# Patient Record
Sex: FEMALE | Race: BLACK OR AFRICAN AMERICAN | NOT HISPANIC OR LATINO | Employment: FULL TIME | URBAN - METROPOLITAN AREA
[De-identification: names, ages, dates, MRNs, and addresses within clinical notes are randomized per-mention and may not be internally consistent; named-entity substitution may affect disease eponyms.]

---

## 2017-03-31 ENCOUNTER — COMMUNICATION - HEALTHEAST (OUTPATIENT)
Dept: INTERNAL MEDICINE | Facility: CLINIC | Age: 22
End: 2017-03-31

## 2017-04-05 ENCOUNTER — COMMUNICATION - HEALTHEAST (OUTPATIENT)
Dept: INTERNAL MEDICINE | Facility: CLINIC | Age: 22
End: 2017-04-05

## 2017-04-07 ENCOUNTER — COMMUNICATION - HEALTHEAST (OUTPATIENT)
Dept: INTERNAL MEDICINE | Facility: CLINIC | Age: 22
End: 2017-04-07

## 2017-05-01 ENCOUNTER — OFFICE VISIT - HEALTHEAST (OUTPATIENT)
Dept: INTERNAL MEDICINE | Facility: CLINIC | Age: 22
End: 2017-05-01

## 2017-05-01 ENCOUNTER — COMMUNICATION - HEALTHEAST (OUTPATIENT)
Dept: INTERNAL MEDICINE | Facility: CLINIC | Age: 22
End: 2017-05-01

## 2017-05-01 DIAGNOSIS — R30.0 DYSURIA: ICD-10-CM

## 2017-05-01 DIAGNOSIS — N89.8 VAGINAL DISCHARGE: ICD-10-CM

## 2017-05-01 DIAGNOSIS — N91.2 ABSENT MENSES: ICD-10-CM

## 2017-05-01 DIAGNOSIS — F43.21 SITUATIONAL DEPRESSION: ICD-10-CM

## 2017-05-01 DIAGNOSIS — E28.2 PCOS (POLYCYSTIC OVARIAN SYNDROME): ICD-10-CM

## 2017-05-01 LAB — HBA1C MFR BLD: 5.6 % (ref 3.5–6)

## 2017-05-01 ASSESSMENT — MIFFLIN-ST. JEOR: SCORE: 1529.21

## 2017-05-02 ENCOUNTER — COMMUNICATION - HEALTHEAST (OUTPATIENT)
Dept: INTERNAL MEDICINE | Facility: CLINIC | Age: 22
End: 2017-05-02

## 2017-05-15 ENCOUNTER — OFFICE VISIT - HEALTHEAST (OUTPATIENT)
Dept: BEHAVIORAL HEALTH | Facility: CLINIC | Age: 22
End: 2017-05-15

## 2017-05-15 DIAGNOSIS — F33.1 MODERATE EPISODE OF RECURRENT MAJOR DEPRESSIVE DISORDER (H): ICD-10-CM

## 2017-05-24 ENCOUNTER — OFFICE VISIT - HEALTHEAST (OUTPATIENT)
Dept: BEHAVIORAL HEALTH | Facility: CLINIC | Age: 22
End: 2017-05-24

## 2017-05-24 DIAGNOSIS — F33.1 MODERATE EPISODE OF RECURRENT MAJOR DEPRESSIVE DISORDER (H): ICD-10-CM

## 2017-06-05 ENCOUNTER — COMMUNICATION - HEALTHEAST (OUTPATIENT)
Dept: INTERNAL MEDICINE | Facility: CLINIC | Age: 22
End: 2017-06-05

## 2017-09-15 ENCOUNTER — OFFICE VISIT - HEALTHEAST (OUTPATIENT)
Dept: BEHAVIORAL HEALTH | Facility: CLINIC | Age: 22
End: 2017-09-15

## 2017-09-15 DIAGNOSIS — F33.1 MODERATE EPISODE OF RECURRENT MAJOR DEPRESSIVE DISORDER (H): ICD-10-CM

## 2017-09-29 ENCOUNTER — OFFICE VISIT - HEALTHEAST (OUTPATIENT)
Dept: BEHAVIORAL HEALTH | Facility: CLINIC | Age: 22
End: 2017-09-29

## 2017-09-29 DIAGNOSIS — F33.1 MODERATE EPISODE OF RECURRENT MAJOR DEPRESSIVE DISORDER (H): ICD-10-CM

## 2017-10-27 ENCOUNTER — OFFICE VISIT - HEALTHEAST (OUTPATIENT)
Dept: BEHAVIORAL HEALTH | Facility: CLINIC | Age: 22
End: 2017-10-27

## 2017-10-27 DIAGNOSIS — F33.1 MODERATE EPISODE OF RECURRENT MAJOR DEPRESSIVE DISORDER (H): ICD-10-CM

## 2018-02-06 ENCOUNTER — OFFICE VISIT - HEALTHEAST (OUTPATIENT)
Dept: INTERNAL MEDICINE | Facility: CLINIC | Age: 23
End: 2018-02-06

## 2018-02-06 DIAGNOSIS — K21.9 GERD (GASTROESOPHAGEAL REFLUX DISEASE): ICD-10-CM

## 2018-02-06 DIAGNOSIS — E55.9 VITAMIN D DEFICIENCY: ICD-10-CM

## 2018-02-06 DIAGNOSIS — M25.512 ACUTE PAIN OF LEFT SHOULDER: ICD-10-CM

## 2018-02-06 DIAGNOSIS — E28.2 PCOS (POLYCYSTIC OVARIAN SYNDROME): ICD-10-CM

## 2018-02-06 DIAGNOSIS — L68.0 HIRSUTISM: ICD-10-CM

## 2018-02-06 LAB — HBA1C MFR BLD: 5.5 % (ref 3.5–6)

## 2018-02-08 ENCOUNTER — COMMUNICATION - HEALTHEAST (OUTPATIENT)
Dept: INTERNAL MEDICINE | Facility: CLINIC | Age: 23
End: 2018-02-08

## 2018-02-08 LAB
25(OH)D3 SERPL-MCNC: 32.7 NG/ML (ref 30–80)
25(OH)D3 SERPL-MCNC: 32.7 NG/ML (ref 30–80)

## 2018-02-22 ENCOUNTER — OFFICE VISIT - HEALTHEAST (OUTPATIENT)
Dept: MIDWIFE SERVICES | Facility: CLINIC | Age: 23
End: 2018-02-22

## 2018-02-22 ENCOUNTER — HOSPITAL ENCOUNTER (OUTPATIENT)
Dept: ULTRASOUND IMAGING | Facility: CLINIC | Age: 23
Discharge: HOME | End: 2018-02-22
Attending: ADVANCED PRACTICE MIDWIFE

## 2018-02-22 DIAGNOSIS — E28.2 PCOS (POLYCYSTIC OVARIAN SYNDROME): ICD-10-CM

## 2018-02-22 DIAGNOSIS — E66.9 OBESITY (BMI 30-39.9): ICD-10-CM

## 2018-02-22 ASSESSMENT — MIFFLIN-ST. JEOR: SCORE: 1520.14

## 2018-02-23 ENCOUNTER — COMMUNICATION - HEALTHEAST (OUTPATIENT)
Dept: MIDWIFE SERVICES | Facility: CLINIC | Age: 23
End: 2018-02-23

## 2018-02-27 ENCOUNTER — COMMUNICATION - HEALTHEAST (OUTPATIENT)
Dept: MIDWIFE SERVICES | Facility: CLINIC | Age: 23
End: 2018-02-27

## 2018-03-01 ENCOUNTER — COMMUNICATION - HEALTHEAST (OUTPATIENT)
Dept: MIDWIFE SERVICES | Facility: CLINIC | Age: 23
End: 2018-03-01

## 2018-03-01 DIAGNOSIS — N97.0 PRIMARY ANOVULATORY INFERTILITY: ICD-10-CM

## 2018-03-12 ENCOUNTER — RECORDS - HEALTHEAST (OUTPATIENT)
Dept: ADMINISTRATIVE | Facility: OTHER | Age: 23
End: 2018-03-12

## 2018-05-05 ENCOUNTER — TRANSFERRED RECORDS (OUTPATIENT)
Dept: HEALTH INFORMATION MANAGEMENT | Facility: CLINIC | Age: 23
End: 2018-05-05

## 2018-06-25 ENCOUNTER — OFFICE VISIT - HEALTHEAST (OUTPATIENT)
Dept: INTERNAL MEDICINE | Facility: CLINIC | Age: 23
End: 2018-06-25

## 2018-06-25 DIAGNOSIS — G43.009 MIGRAINE WITHOUT AURA AND WITHOUT STATUS MIGRAINOSUS, NOT INTRACTABLE: ICD-10-CM

## 2018-06-25 DIAGNOSIS — K21.9 GASTROESOPHAGEAL REFLUX DISEASE WITHOUT ESOPHAGITIS: ICD-10-CM

## 2018-08-29 ENCOUNTER — OFFICE VISIT - HEALTHEAST (OUTPATIENT)
Dept: INTERNAL MEDICINE | Facility: CLINIC | Age: 23
End: 2018-08-29

## 2018-08-29 ENCOUNTER — COMMUNICATION - HEALTHEAST (OUTPATIENT)
Dept: SCHEDULING | Facility: CLINIC | Age: 23
End: 2018-08-29

## 2018-08-29 DIAGNOSIS — L81.0 HYPERPIGMENTATION OF SKIN, POSTINFLAMMATORY: ICD-10-CM

## 2018-08-29 DIAGNOSIS — K21.9 GASTROESOPHAGEAL REFLUX DISEASE WITHOUT ESOPHAGITIS: ICD-10-CM
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
## 2018-08-29 DIAGNOSIS — G43.009 MIGRAINE WITHOUT AURA AND WITHOUT STATUS MIGRAINOSUS, NOT INTRACTABLE: ICD-10-CM

## 2018-08-29 ASSESSMENT — MIFFLIN-ST. JEOR: SCORE: 1543.73

## 2018-12-12 ENCOUNTER — COMMUNICATION - HEALTHEAST (OUTPATIENT)
Dept: INTERNAL MEDICINE | Facility: CLINIC | Age: 23
End: 2018-12-12

## 2019-03-28 ENCOUNTER — OFFICE VISIT - HEALTHEAST (OUTPATIENT)
Dept: INTERNAL MEDICINE | Facility: CLINIC | Age: 24
End: 2019-03-28

## 2019-03-28 DIAGNOSIS — Z12.4 SCREENING FOR CERVICAL CANCER: ICD-10-CM

## 2019-03-28 DIAGNOSIS — B96.89 BACTERIAL VAGINITIS: ICD-10-CM

## 2019-03-28 DIAGNOSIS — Z00.00 HEALTH MAINTENANCE EXAMINATION: ICD-10-CM

## 2019-03-28 DIAGNOSIS — N76.0 BACTERIAL VAGINITIS: ICD-10-CM

## 2019-03-28 DIAGNOSIS — E55.9 VITAMIN D DEFICIENCY: ICD-10-CM

## 2019-03-28 DIAGNOSIS — Z11.3 SCREEN FOR STD (SEXUALLY TRANSMITTED DISEASE): ICD-10-CM

## 2019-03-28 DIAGNOSIS — J01.90 ACUTE SINUSITIS, RECURRENCE NOT SPECIFIED, UNSPECIFIED LOCATION: ICD-10-CM

## 2019-03-28 DIAGNOSIS — R73.9 HYPERGLYCEMIA: ICD-10-CM

## 2019-03-28 DIAGNOSIS — R11.0 NAUSEA: ICD-10-CM

## 2019-03-28 DIAGNOSIS — N89.8 VAGINAL DISCHARGE: ICD-10-CM

## 2019-03-28 DIAGNOSIS — K21.9 GASTROESOPHAGEAL REFLUX DISEASE WITHOUT ESOPHAGITIS: ICD-10-CM

## 2019-03-28 DIAGNOSIS — E28.2 PCOS (POLYCYSTIC OVARIAN SYNDROME): ICD-10-CM

## 2019-03-28 LAB
ALBUMIN SERPL-MCNC: 3.7 G/DL (ref 3.5–5)
ALP SERPL-CCNC: 57 U/L (ref 45–120)
ALT SERPL W P-5'-P-CCNC: 13 U/L (ref 0–45)
ANION GAP SERPL CALCULATED.3IONS-SCNC: 11 MMOL/L (ref 5–18)
AST SERPL W P-5'-P-CCNC: 15 U/L (ref 0–40)
BILIRUB SERPL-MCNC: 0.2 MG/DL (ref 0–1)
BUN SERPL-MCNC: 15 MG/DL (ref 8–22)
CALCIUM SERPL-MCNC: 9.7 MG/DL (ref 8.5–10.5)
CHLORIDE BLD-SCNC: 104 MMOL/L (ref 98–107)
CLUE CELLS: ABNORMAL
CO2 SERPL-SCNC: 26 MMOL/L (ref 22–31)
CREAT SERPL-MCNC: 0.79 MG/DL (ref 0.6–1.1)
ERYTHROCYTE [DISTWIDTH] IN BLOOD BY AUTOMATED COUNT: 12.1 % (ref 11–14.5)
GFR SERPL CREATININE-BSD FRML MDRD: >60 ML/MIN/1.73M2
GLUCOSE BLD-MCNC: 93 MG/DL (ref 70–125)
HBA1C MFR BLD: 5.6 % (ref 3.5–6)
HCT VFR BLD AUTO: 41.3 % (ref 35–47)
HGB BLD-MCNC: 13.6 G/DL (ref 12–16)
HIV 1+2 AB+HIV1 P24 AG SERPL QL IA: NEGATIVE
MCH RBC QN AUTO: 25.8 PG (ref 27–34)
MCHC RBC AUTO-ENTMCNC: 33 G/DL (ref 32–36)
MCV RBC AUTO: 78 FL (ref 80–100)
PLATELET # BLD AUTO: 287 THOU/UL (ref 140–440)
PMV BLD AUTO: 7.3 FL (ref 7–10)
POTASSIUM BLD-SCNC: 4 MMOL/L (ref 3.5–5)
PROT SERPL-MCNC: 7.2 G/DL (ref 6–8)
RBC # BLD AUTO: 5.28 MILL/UL (ref 3.8–5.4)
SODIUM SERPL-SCNC: 141 MMOL/L (ref 136–145)
TRICHOMONAS, WET PREP: ABNORMAL
WBC: 4.1 THOU/UL (ref 4–11)
YEAST, WET PREP: ABNORMAL

## 2019-03-28 ASSESSMENT — MIFFLIN-ST. JEOR: SCORE: 1559.02

## 2019-03-29 LAB
25(OH)D3 SERPL-MCNC: 27.5 NG/ML (ref 30–80)
25(OH)D3 SERPL-MCNC: 27.5 NG/ML (ref 30–80)
C TRACH DNA SPEC QL PROBE+SIG AMP: NEGATIVE
N GONORRHOEA DNA SPEC QL NAA+PROBE: NEGATIVE
T PALLIDUM AB SER QL: NEGATIVE

## 2019-04-01 LAB
BKR LAB AP ABNORMAL BLEEDING: NO
BKR LAB AP BIRTH CONTROL/HORMONES: NORMAL
BKR LAB AP CERVICAL APPEARANCE: NORMAL
BKR LAB AP GYN ADEQUACY: NORMAL
BKR LAB AP GYN INTERPRETATION: NORMAL
BKR LAB AP HPV REFLEX: NORMAL
BKR LAB AP LMP: NORMAL
BKR LAB AP PATIENT STATUS: NORMAL
BKR LAB AP PREVIOUS ABNORMAL: NO
BKR LAB AP PREVIOUS NORMAL: NORMAL
HIGH RISK?: NO
PATH REPORT.COMMENTS IMP SPEC: NORMAL
RESULT FLAG (HE HISTORICAL CONVERSION): NORMAL

## 2019-04-08 ENCOUNTER — OFFICE VISIT - HEALTHEAST (OUTPATIENT)
Dept: BEHAVIORAL HEALTH | Facility: CLINIC | Age: 24
End: 2019-04-08

## 2019-04-08 DIAGNOSIS — F33.1 MODERATE EPISODE OF RECURRENT MAJOR DEPRESSIVE DISORDER (H): ICD-10-CM

## 2019-04-08 DIAGNOSIS — F41.1 GAD (GENERALIZED ANXIETY DISORDER): ICD-10-CM

## 2019-04-09 ENCOUNTER — COMMUNICATION - HEALTHEAST (OUTPATIENT)
Dept: INTERNAL MEDICINE | Facility: CLINIC | Age: 24
End: 2019-04-09

## 2019-04-15 ENCOUNTER — OFFICE VISIT - HEALTHEAST (OUTPATIENT)
Dept: BEHAVIORAL HEALTH | Facility: CLINIC | Age: 24
End: 2019-04-15

## 2019-04-15 DIAGNOSIS — F33.1 MODERATE EPISODE OF RECURRENT MAJOR DEPRESSIVE DISORDER (H): ICD-10-CM

## 2019-04-15 DIAGNOSIS — F41.1 GAD (GENERALIZED ANXIETY DISORDER): ICD-10-CM

## 2019-05-23 ENCOUNTER — OFFICE VISIT - HEALTHEAST (OUTPATIENT)
Dept: INTERNAL MEDICINE | Facility: CLINIC | Age: 24
End: 2019-05-23

## 2019-05-23 DIAGNOSIS — J01.90 ACUTE SINUSITIS, RECURRENCE NOT SPECIFIED, UNSPECIFIED LOCATION: ICD-10-CM

## 2019-05-23 ASSESSMENT — MIFFLIN-ST. JEOR: SCORE: 1584.77

## 2019-06-23 ENCOUNTER — COMMUNICATION - HEALTHEAST (OUTPATIENT)
Dept: SCHEDULING | Facility: CLINIC | Age: 24
End: 2019-06-23

## 2019-06-24 ENCOUNTER — OFFICE VISIT - HEALTHEAST (OUTPATIENT)
Dept: INTERNAL MEDICINE | Facility: CLINIC | Age: 24
End: 2019-06-24

## 2019-06-24 DIAGNOSIS — N76.0 ACUTE VAGINITIS: ICD-10-CM

## 2019-06-24 LAB
CLUE CELLS: ABNORMAL
TRICHOMONAS, WET PREP: ABNORMAL
YEAST, WET PREP: ABNORMAL

## 2019-06-24 ASSESSMENT — MIFFLIN-ST. JEOR: SCORE: 1524.68

## 2019-07-08 ENCOUNTER — OFFICE VISIT - HEALTHEAST (OUTPATIENT)
Dept: INTERNAL MEDICINE | Facility: CLINIC | Age: 24
End: 2019-07-08

## 2019-07-08 ENCOUNTER — COMMUNICATION - HEALTHEAST (OUTPATIENT)
Dept: SCHEDULING | Facility: CLINIC | Age: 24
End: 2019-07-08

## 2019-07-08 DIAGNOSIS — G56.21 ULNAR NEURITIS, RIGHT: ICD-10-CM

## 2019-07-08 DIAGNOSIS — M25.531 RIGHT WRIST PAIN: ICD-10-CM

## 2019-07-29 ENCOUNTER — COMMUNICATION - HEALTHEAST (OUTPATIENT)
Dept: SCHEDULING | Facility: CLINIC | Age: 24
End: 2019-07-29

## 2019-08-02 ENCOUNTER — RECORDS - HEALTHEAST (OUTPATIENT)
Dept: ADMINISTRATIVE | Facility: OTHER | Age: 24
End: 2019-08-02

## 2019-08-02 ENCOUNTER — OFFICE VISIT - HEALTHEAST (OUTPATIENT)
Dept: FAMILY MEDICINE | Facility: CLINIC | Age: 24
End: 2019-08-02

## 2019-08-02 DIAGNOSIS — K21.9 GASTROESOPHAGEAL REFLUX DISEASE WITHOUT ESOPHAGITIS: ICD-10-CM

## 2019-08-02 DIAGNOSIS — J39.2 THROAT IRRITATION: ICD-10-CM

## 2019-08-16 ENCOUNTER — RECORDS - HEALTHEAST (OUTPATIENT)
Dept: ADMINISTRATIVE | Facility: OTHER | Age: 24
End: 2019-08-16

## 2019-09-13 ENCOUNTER — RECORDS - HEALTHEAST (OUTPATIENT)
Dept: ADMINISTRATIVE | Facility: OTHER | Age: 24
End: 2019-09-13

## 2019-09-17 ENCOUNTER — RECORDS - HEALTHEAST (OUTPATIENT)
Dept: ADMINISTRATIVE | Facility: OTHER | Age: 24
End: 2019-09-17

## 2019-09-17 LAB
LAB AP CHARGES (HE HISTORICAL CONVERSION): NORMAL
PATH REPORT.COMMENTS IMP SPEC: NORMAL
PATH REPORT.FINAL DX SPEC: NORMAL
PATH REPORT.GROSS SPEC: NORMAL
PATH REPORT.MICROSCOPIC SPEC OTHER STN: NORMAL
PATH REPORT.RELEVANT HX SPEC: NORMAL
RESULT FLAG (HE HISTORICAL CONVERSION): NORMAL

## 2019-09-20 ENCOUNTER — RECORDS - HEALTHEAST (OUTPATIENT)
Dept: ADMINISTRATIVE | Facility: OTHER | Age: 24
End: 2019-09-20

## 2019-12-17 ENCOUNTER — COMMUNICATION - HEALTHEAST (OUTPATIENT)
Dept: SCHEDULING | Facility: CLINIC | Age: 24
End: 2019-12-17

## 2019-12-17 DIAGNOSIS — N76.0 VAGINITIS: ICD-10-CM

## 2019-12-30 ENCOUNTER — COMMUNICATION - HEALTHEAST (OUTPATIENT)
Dept: INTERNAL MEDICINE | Facility: CLINIC | Age: 24
End: 2019-12-30

## 2019-12-30 DIAGNOSIS — K21.9 GASTROESOPHAGEAL REFLUX DISEASE WITHOUT ESOPHAGITIS: ICD-10-CM

## 2020-02-12 ENCOUNTER — COMMUNICATION - HEALTHEAST (OUTPATIENT)
Dept: SCHEDULING | Facility: CLINIC | Age: 25
End: 2020-02-12

## 2020-02-12 ENCOUNTER — HOSPITAL ENCOUNTER (OUTPATIENT)
Dept: ULTRASOUND IMAGING | Facility: CLINIC | Age: 25
Discharge: HOME | End: 2020-02-12
Attending: ADVANCED PRACTICE MIDWIFE

## 2020-02-12 ENCOUNTER — OFFICE VISIT - HEALTHEAST (OUTPATIENT)
Dept: MIDWIFE SERVICES | Facility: CLINIC | Age: 25
End: 2020-02-12

## 2020-02-12 DIAGNOSIS — Z23 NEED FOR PROPHYLACTIC VACCINATION WITH TETANUS-DIPHTHERIA (TD): ICD-10-CM

## 2020-02-12 DIAGNOSIS — R10.32 LLQ ABDOMINAL PAIN: ICD-10-CM

## 2020-02-12 LAB
ALBUMIN UR-MCNC: NEGATIVE MG/DL
APPEARANCE UR: CLEAR
BILIRUB UR QL STRIP: NEGATIVE
CLUE CELLS: NORMAL
COLOR UR AUTO: YELLOW
GLUCOSE UR STRIP-MCNC: NEGATIVE MG/DL
HCG UR QL: NEGATIVE
HGB UR QL STRIP: NEGATIVE
KETONES UR STRIP-MCNC: NEGATIVE MG/DL
LEUKOCYTE ESTERASE UR QL STRIP: NEGATIVE
NITRATE UR QL: NEGATIVE
PH UR STRIP: 7 [PH] (ref 5–8)
SP GR UR STRIP: 1.02 (ref 1–1.03)
TRICHOMONAS, WET PREP: NORMAL
UROBILINOGEN UR STRIP-ACNC: NORMAL
YEAST, WET PREP: NORMAL

## 2020-02-12 RX ORDER — 1.1% SODIUM FLUORIDE PRESCRIPTION DENTAL CREAM 5 MG/G
CREAM DENTAL
Status: SHIPPED | COMMUNITY
Start: 2020-02-06 | End: 2023-01-25

## 2020-02-12 ASSESSMENT — MIFFLIN-ST. JEOR: SCORE: 1488.39

## 2020-02-13 LAB
C TRACH DNA SPEC QL PROBE+SIG AMP: NEGATIVE
N GONORRHOEA DNA SPEC QL NAA+PROBE: NEGATIVE

## 2020-03-04 ENCOUNTER — OFFICE VISIT - HEALTHEAST (OUTPATIENT)
Dept: MIDWIFE SERVICES | Facility: CLINIC | Age: 25
End: 2020-03-04

## 2020-03-04 DIAGNOSIS — N94.6 DYSMENORRHEA: ICD-10-CM

## 2020-03-04 DIAGNOSIS — Z11.3 SCREEN FOR STD (SEXUALLY TRANSMITTED DISEASE): ICD-10-CM

## 2020-03-04 DIAGNOSIS — N93.9 ABNORMAL UTERINE BLEEDING (AUB): ICD-10-CM

## 2020-03-04 DIAGNOSIS — N97.0 PRIMARY ANOVULATORY INFERTILITY: ICD-10-CM

## 2020-03-04 DIAGNOSIS — E28.2 PCOS (POLYCYSTIC OVARIAN SYNDROME): ICD-10-CM

## 2020-03-04 LAB
CLUE CELLS: NORMAL
HCG SERPL-ACNC: <2 MLU/ML (ref 0–4)
HIV 1+2 AB+HIV1 P24 AG SERPL QL IA: NEGATIVE
TRICHOMONAS, WET PREP: NORMAL
YEAST, WET PREP: NORMAL

## 2020-03-04 ASSESSMENT — MIFFLIN-ST. JEOR: SCORE: 1506.53

## 2020-03-05 LAB
C TRACH DNA SPEC QL PROBE+SIG AMP: NEGATIVE
N GONORRHOEA DNA SPEC QL NAA+PROBE: NEGATIVE
T PALLIDUM AB SER QL: NEGATIVE

## 2020-03-24 ENCOUNTER — COMMUNICATION - HEALTHEAST (OUTPATIENT)
Dept: INTERNAL MEDICINE | Facility: CLINIC | Age: 25
End: 2020-03-24

## 2020-03-24 ENCOUNTER — OFFICE VISIT - HEALTHEAST (OUTPATIENT)
Dept: FAMILY MEDICINE | Facility: CLINIC | Age: 25
End: 2020-03-24

## 2020-03-24 DIAGNOSIS — H10.31 ACUTE BACTERIAL CONJUNCTIVITIS OF RIGHT EYE: ICD-10-CM

## 2020-03-24 DIAGNOSIS — Z32.00 ENCOUNTER FOR PREGNANCY TEST, RESULT UNKNOWN: ICD-10-CM

## 2020-03-24 LAB — HCG UR QL: NEGATIVE

## 2020-03-30 ENCOUNTER — COMMUNICATION - HEALTHEAST (OUTPATIENT)
Dept: INTERNAL MEDICINE | Facility: CLINIC | Age: 25
End: 2020-03-30

## 2020-04-30 ENCOUNTER — OFFICE VISIT - HEALTHEAST (OUTPATIENT)
Dept: INTERNAL MEDICINE | Facility: CLINIC | Age: 25
End: 2020-04-30

## 2020-04-30 ENCOUNTER — COMMUNICATION - HEALTHEAST (OUTPATIENT)
Dept: INTERNAL MEDICINE | Facility: CLINIC | Age: 25
End: 2020-04-30

## 2020-04-30 DIAGNOSIS — J01.01 ACUTE RECURRENT MAXILLARY SINUSITIS: ICD-10-CM

## 2020-05-01 ENCOUNTER — COMMUNICATION - HEALTHEAST (OUTPATIENT)
Dept: INTERNAL MEDICINE | Facility: CLINIC | Age: 25
End: 2020-05-01

## 2020-09-08 ENCOUNTER — VIRTUAL VISIT (OUTPATIENT)
Dept: FAMILY MEDICINE | Facility: OTHER | Age: 25
End: 2020-09-08

## 2020-09-08 NOTE — PROGRESS NOTES
"Date: 2020 12:48:09  Clinician: Dominguez Seay  Clinician NPI: [id number]  Patient: Sakshi White  Patient : 1995  Patient Address: [facility] [address], [place] [address]  Patient Phone: [phone]  Visit Protocol: URI  Patient Summary:  Sakshi is a 25 year old ( : 1995 ) female who initiated a Visit for COVID-19 (Coronavirus) evaluation and screening. When asked the question \"Please sign me up to receive news, health information and promotions. \", Sakshi responded \"No\".    Sakshi states her symptoms started 1-2 days ago.   Her symptoms consist of ear pain, anosmia, facial pain or pressure, rhinitis, a sore throat, nasal congestion, a headache, chills, and malaise. She is experiencing mild difficulty breathing with activities but can speak normally in full sentences.   Symptom details     Nasal secretions: The color of her mucus is white and yellow.    Sore throat: Sakshi reports having mild throat pain (1-3 on a 10 point pain scale), does not have exudate on her tonsils, and can swallow liquids. She is not sure if the lymph nodes in her neck are enlarged. A rash has not appeared on the skin since the sore throat started.     Facial pain or pressure: The facial pain or pressure feels worse when bending over or leaning forward.     Headache: She states the headache is mild (1-3 on a 10 point pain scale).      Sakshi denies having fever, vomiting, nausea, wheezing, teeth pain, ageusia, diarrhea, cough, and myalgias. She also denies taking antibiotic medication in the past month and having recent facial or sinus surgery in the past 60 days.   Precipitating events  Sakshi is not sure if she has been exposed to someone with strep throat. She has not recently been exposed to someone with influenza. Sakshi has been in close contact with the following high risk individuals: adults 65 or older and people with asthma, heart disease or diabetes.   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   She does not work or volunteer as healthcare worker or a  and does not work or volunteer in a healthcare facility.   Sakshi has not lived in a congregate living setting in the past 14 days. She does not live with a healthcare worker.   Sakshi has not had a close contact with a laboratory-confirmed COVID-19 patient within 14 days of symptom onset.   Since December 2019, Sakshi and has not had upper respiratory infection or influenza-like illness. Has not been diagnosed with lab-confirmed COVID-19 test   Pertinent medical history  Saksih had 1 sinus infection within the past year.   Sakshi does not get yeast infections when she takes antibiotics.   Sakshi needs a return to work/school note.   Weight: 180 lbs   Sakshi does not smoke or use smokeless tobacco.   She denies pregnancy and denies breastfeeding. Her last period was over a month ago.   Weight: 180 lbs    MEDICATIONS: Benadryl Allergy oral, famotidine oral, omeprazole oral, ALLERGIES: NKDA  Clinician Response:  Dear Sakshi,   Your symptoms show that you may have coronavirus (COVID-19). This illness can cause fever, cough and trouble breathing. Many people get a mild case and get better on their own. Some people can get very sick.  What should I do?  We would like to test you for this virus.   1. Please call [phone] to schedule your visit. Explain that you were referred by [facility] to have a COVID-19 test. Be ready to share your [facility] visit ID number.  The following will serve as your written order for this COVID Test, ordered by me, for the indication of suspected COVID [Z20.828]: The test will be ordered in [facility], our electronic health record, after you are scheduled. It will show as ordered and authorized by Cash White MD.  Order: COVID-19 (Coronavirus) PCR for SYMPTOMATIC testing from [facility].      2. When it's time for your COVID test:   "Stay at least 6 feet away from others. (If someone will drive you to your test, stay in the backseat, as far away from the  as you can.)   Cover your mouth and nose with a mask, tissue or washcloth.  Go straight to the testing site. Don't make any stops on the way there or back.      3.Starting now: Stay home and away from others (self-isolate) until:   You've had no fever---and no medicine that reduces fever---for one full day (24 hours). And...   Your other symptoms have gotten better. For example, your cough or breathing has improved. And...   At least 10 days have passed since your symptoms started.       During this time, don't leave the house except for testing or medical care.   Stay in your own room, even for meals. Use your own bathroom if you can.   Stay away from others in your home. No hugging, kissing or shaking hands. No visitors.  Don't go to work, school or anywhere else.    Clean \"high touch\" surfaces often (doorknobs, counters, handles, etc.). Use a household cleaning spray or wipes. You'll find a full list of  on the EPA website: www.epa.gov/pesticide-registration/list-n-disinfectants-use-against-sars-cov-2.   Cover your mouth and nose with a mask, tissue or washcloth to avoid spreading germs.  Wash your hands and face often. Use soap and water.  Caregivers in these groups are at risk for severe illness due to COVID-19:  o People 65 years and older  o People who live in a nursing home or long-term care facility  o People with chronic disease (lung, heart, cancer, diabetes, kidney, liver, immunologic)  o People who have a weakened immune system, including those who:   Are in cancer treatment  Take medicine that weakens the immune system, such as corticosteroids  Had a bone marrow or organ transplant  Have an immune deficiency  Have poorly controlled HIV or AIDS  Are obese (body mass index of 40 or higher)  Smoke regularly   o Caregivers should wear gloves while washing dishes, " handling laundry and cleaning bedrooms and bathrooms.  o Use caution when washing and drying laundry: Don't shake dirty laundry, and use the warmest water setting that you can.  o For more tips, go to www.cdc.gov/coronavirus/2019-ncov/downloads/10Things.pdf.    4.Sign up for [employer] assistant. We know it's scary to hear that you might have COVID-19. We want to track your symptoms to make sure you're okay over the next 2 weeks. Please look for an email from [employer]ER[employer] assistant---this is a free, online program that we'll use to keep in touch. To sign up, follow the link in the email. Learn more at [email or web address]  How can I take care of myself?   Get lots of rest. Drink extra fluids (unless a doctor has told you not to).   Take Tylenol (acetaminophen) for fever or pain. If you have liver or kidney problems, ask your family doctor if it's okay to take Tylenol.   Adults can take either:    650 mg (two 325 mg pills) every 4 to 6 hours, or...   1,000 mg (two 500 mg pills) every 8 hours as needed.    Note: Don't take more than 3,000 mg in one day. Acetaminophen is found in many medicines (both prescribed and over-the-counter medicines). Read all labels to be sure you don't take too much.   For children, check the Tylenol bottle for the right dose. The dose is based on the child's age or weight.    If you have other health problems (like cancer, heart failure, an organ transplant or severe kidney disease): Call your specialty clinic if you don't feel better in the next 2 days.       Know when to call 911. Emergency warning signs include:    Trouble breathing or shortness of breath Pain or pressure in the chest that doesn't go away Feeling confused like you haven't felt before, or not being able to wake up Bluish-colored lips or face.  Where can I get more information?    [facility] [place] -- About COVID-19: [email or web address]   CDC -- What to Do If You're Sick:  www.cdc.gov/coronavirus/2019-ncov/about/steps-when-sick.html   CDC -- Ending Home Isolation: www.cdc.gov/coronavirus/2019-ncov/hcp/disposition-in-home-patients.html   [facility] -- Caring for Someone: www.cdc.gov/coronavirus/2019-ncov/if-you-are-sick/care-for-someone.html   [facility] -- Interim Guidance for Hospital Discharge to Home: www.[facility].[place]./diseases/coronavirus/hcp/hospdischarge.pdf   [facility] clinical trials (COVID-19 research studies): clinicalaffairs.[facility]-clinical-trials    Below are the COVID-19 hotlines at the [place] Department of [facility] ([facility]). Interpreters are available.    For health questions: Call 211-079-6829 or 1-131.980.8283 (7 a.m. to 7 p.m.) For questions about schools and childcare: Call 508-800-8113 or 1-778.593.6656 (7 a.m. to 7 p.m.)    Diagnosis: Other malaise  Diagnosis ICD: R53.81

## 2020-09-09 ENCOUNTER — AMBULATORY - HEALTHEAST (OUTPATIENT)
Dept: FAMILY MEDICINE | Facility: CLINIC | Age: 25
End: 2020-09-09

## 2020-09-09 ENCOUNTER — AMBULATORY - HEALTHEAST (OUTPATIENT)
Dept: INTERNAL MEDICINE | Facility: CLINIC | Age: 25
End: 2020-09-09

## 2020-09-09 DIAGNOSIS — Z20.822 SUSPECTED 2019 NOVEL CORONAVIRUS INFECTION: ICD-10-CM

## 2020-09-11 ENCOUNTER — COMMUNICATION - HEALTHEAST (OUTPATIENT)
Dept: SCHEDULING | Facility: CLINIC | Age: 25
End: 2020-09-11

## 2020-11-18 ENCOUNTER — COMMUNICATION - HEALTHEAST (OUTPATIENT)
Dept: INTERNAL MEDICINE | Facility: CLINIC | Age: 25
End: 2020-11-18

## 2020-11-19 ENCOUNTER — COMMUNICATION - HEALTHEAST (OUTPATIENT)
Dept: INTERNAL MEDICINE | Facility: CLINIC | Age: 25
End: 2020-11-19

## 2020-11-19 ENCOUNTER — OFFICE VISIT - HEALTHEAST (OUTPATIENT)
Dept: INTERNAL MEDICINE | Facility: CLINIC | Age: 25
End: 2020-11-19

## 2020-11-19 DIAGNOSIS — U07.1 CLINICAL DIAGNOSIS OF COVID-19: ICD-10-CM

## 2020-11-19 RX ORDER — MEDROXYPROGESTERONE ACETATE 10 MG
10 TABLET ORAL DAILY
Status: SHIPPED | COMMUNITY
Start: 2020-07-20 | End: 2021-08-29

## 2021-01-25 ENCOUNTER — OFFICE VISIT - HEALTHEAST (OUTPATIENT)
Dept: FAMILY MEDICINE | Facility: CLINIC | Age: 26
End: 2021-01-25

## 2021-01-25 DIAGNOSIS — E28.2 PCOS (POLYCYSTIC OVARIAN SYNDROME): ICD-10-CM

## 2021-01-25 DIAGNOSIS — N94.10 DYSPAREUNIA IN FEMALE: ICD-10-CM

## 2021-01-25 DIAGNOSIS — N89.8 VAGINAL DISCHARGE: ICD-10-CM

## 2021-01-25 DIAGNOSIS — Z00.00 ANNUAL PHYSICAL EXAM: ICD-10-CM

## 2021-01-25 DIAGNOSIS — F32.2 SEVERE MAJOR DEPRESSION, SINGLE EPISODE (H): ICD-10-CM

## 2021-01-25 LAB
CLUE CELLS: NORMAL
TRICHOMONAS, WET PREP: NORMAL
YEAST, WET PREP: NORMAL

## 2021-01-25 ASSESSMENT — MIFFLIN-ST. JEOR: SCORE: 1594.98

## 2021-01-26 LAB
C TRACH DNA SPEC QL PROBE+SIG AMP: NEGATIVE
N GONORRHOEA DNA SPEC QL NAA+PROBE: NEGATIVE

## 2021-04-02 ENCOUNTER — OFFICE VISIT - HEALTHEAST (OUTPATIENT)
Dept: FAMILY MEDICINE | Facility: CLINIC | Age: 26
End: 2021-04-02

## 2021-04-02 ENCOUNTER — COMMUNICATION - HEALTHEAST (OUTPATIENT)
Dept: FAMILY MEDICINE | Facility: CLINIC | Age: 26
End: 2021-04-02

## 2021-04-02 DIAGNOSIS — R30.0 DYSURIA: ICD-10-CM

## 2021-04-02 DIAGNOSIS — Z20.822 SUSPECTED COVID-19 VIRUS INFECTION: ICD-10-CM

## 2021-04-05 ENCOUNTER — AMBULATORY - HEALTHEAST (OUTPATIENT)
Dept: FAMILY MEDICINE | Facility: CLINIC | Age: 26
End: 2021-04-05

## 2021-04-05 DIAGNOSIS — Z20.822 SUSPECTED COVID-19 VIRUS INFECTION: ICD-10-CM

## 2021-04-07 ENCOUNTER — COMMUNICATION - HEALTHEAST (OUTPATIENT)
Dept: SCHEDULING | Facility: CLINIC | Age: 26
End: 2021-04-07
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
## 2021-05-27 NOTE — PROGRESS NOTES
[facility] Clinic Note    Sakshi White   23 y.o. female    Date of Visit: 3/28/2019  Chief Complaint   Patient presents with     Annual Exam     non fasting     Vaginal Discharge     at the end of February and now has an odor     Nasal Congestion     since Monday with throat pain     Ear Pain       ASSESSMENT/PLAN  1. Health maintenance examination  prenatal no115-iron-folic acid 29 mg iron- 1 mg Chew    Glycosylated Hemoglobin A1c    Comprehensive Metabolic Panel    HM2(CBC w/o Differential)   2. Acute sinusitis, recurrence not specified, unspecified location  amoxicillin (AMOXIL) 875 MG tablet   3. Nausea  ondansetron (ZOFRAN) 4 MG tablet   4. Screening for cervical cancer  Gynecologic Cytology (PAP Smear)   5. Screen for STD (sexually transmitted disease)  Chlamydia trachomatis & Neisseria gonorrhoeae, Amplified Detection    HIV Antigen/Antibody Screening [place]    Syphilis Screen, [facility]   6. Vaginal discharge  Wet Prep, Vaginal   7. Bacterial vaginitis  metroNIDAZOLE (METROGEL) 0.75 % vaginal gel   8. PCOS (polycystic ovarian syndrome)  metFORMIN (GLUCOPHAGE) 500 MG tablet   9. Hyperglycemia  Glycosylated Hemoglobin A1c   10. Gastroesophageal reflux disease without esophagitis  omeprazole (PRILOSEC) 20 MG capsule    ranitidine (ZANTAC) 150 MG tablet   11. Vitamin D deficiency  cholecalciferol, vitamin D3, 1,000 unit tablet    Vitamin D, Total (25-Hydroxy)     ---------------------------------------------    1.  Sakshi is a 23-year-old woman here for annual physical.  She is not fasting for labs, but we will do diabetes screen with A1c, and check the other above labs.  Prenatal vitamin refilled for her.  She has had issues with fertility based on PCOS, see issue identified separately below.  Due to the other issues raised and addressed at this visit, we were unable to discuss in detail the weight, but she is aware she needs to lose weight.    2.  She has symptoms of acute bacterial sinusitis, severe  facial pain, thick purulent discharge, only going on for 3 days, but given severity of symptoms, treat with high-dose amoxicillin.    3.  She has felt nauseous with the sinus infection, we will do Zofran    4.  Update Pap smear, will be due 3 years from now if normal    5.  She is on a long-term relationship, but wished to do STD screen    6-7.  She has vaginal discharge and wet prep positive for clue cells, which fits with bacterial vaginosis.  She elected for the vaginal gel instead of the tablets, I will prescribe this for 7 days instead of the typical 5 since we will also be having her on amoxicillin for this #2    8.  She has PCOS, has dealt with infertility, this was worked up.  I suggested we try metformin as she has been amenorrheic for 1 year and is trying to lose weight.  She can try 500 mg daily for a few days, then increase to twice a day.  There may be further opportunity to further increase this medication.    9.  Again was seen    10.  Refill omeprazole and Zantac    11.  Refill vitamin D    Return in about 6 months (around 9/28/2019) for Recheck.      SUBJECTIVE  Sakshi White is here for physical.      She has vaginal discharge with odor for about 1 month, no itching.  No treatment attempted.  She is due for pap.    Ever since Monday, which was 3 days ago, she has had sinus congestion, copious amounts of purulent drainage from the nose, sinus pain, no fevers, but she also has left ear pain.    She has not had a period for 1 year, she has history of polycystic ovarian syndrome, has seen OB/GYN for this, also the fertility expert, she has not been on metformin in the past.  She would also like to lose weight, so she is interested in this.    She is in a steady relationship with her boyfriend [place], she would still like STD screen.     She is not fasting currently.    ROS A comprehensive review of systems was performed and was otherwise negative    Medications, allergies, and problem list were  reviewed and updated    Patient Active Problem List   Diagnosis     PCOS (polycystic ovarian syndrome)     Hirsutism     Vitamin D deficiency     Obesity (BMI 30-39.9)     Primary anovulatory infertility     Migraine without aura and without status migrainosus, not intractable     Gastroesophageal reflux disease without esophagitis     Past Medical History:   Diagnosis Date     Depression     talk therapy     PCOS (polycystic ovarian syndrome)      Secondary amenorrhea      Vaginitis      Varicella     as child     Past Surgical History:   Procedure Laterality Date     PELVIC LAPAROSCOPY  08/19/2015     Social History     Socioeconomic History     Marital status: Single     Spouse name: Not on file     Number of children: Not on file     Years of education: Not on file     Highest education level: Not on file   Occupational History     Employer: [employer]   Social Needs     Financial resource strain: Not on file     Food insecurity:     Worry: Not on file     Inability: Not on file     Transportation needs:     Medical: Not on file     Non-medical: Not on file   Tobacco Use     Smoking status: Never Smoker     Smokeless tobacco: Never Used   Substance and Sexual Activity     Alcohol use: No     Comment: 1-2x/year     Drug use: No     Sexual activity: Yes     Partners: Male     Birth control/protection: None     Comment: 2+ year monog   Lifestyle     Physical activity:     Days per week: Not on file     Minutes per session: Not on file     Stress: Not on file   Relationships     Social connections:     Talks on phone: Not on file     Gets together: Not on file     Attends Scientologist service: Not on file     Active member of club or organization: Not on file     Attends meetings of clubs or organizations: Not on file     Relationship status: Not on file     Intimate partner violence:     Fear of current or ex partner: Not on file     Emotionally abused: Not on file     Physically abused: Not on file     Forced sexual  activity: Not on file   Other Topics Concern     Not on file   Social History Narrative    Patient lives with her boyfriend.  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     Family History   Problem Relation Age of Onset     Hypertension Mother      Kidney disease Mother      Diabetes Father      Hypertension Father      Cancer Maternal Grandmother         unsure type, organ failure, used to be drug addict     Diabetes Paternal Grandmother      Vision loss Paternal Grandmother      No Medical Problems Sister      No Medical Problems Brother      No Medical Problems Brother      No Medical Problems Sister      No Medical Problems Sister      No Medical Problems Sister        Current Outpatient Medications   Medication Sig Dispense Refill     ibuprofen (ADVIL,MOTRIN) 800 MG tablet Take 800 mg by mouth every 6 (six) hours as needed for pain.       omeprazole (PRILOSEC) 20 MG capsule Take 1 capsule (20 mg total) by mouth daily as needed. 90 capsule 3     ranitidine (ZANTAC) 150 MG tablet Take 1-2 tablets (150-300 mg total) by mouth 2 (two) times a day. 90 tablet 3     amoxicillin (AMOXIL) 875 MG tablet Take 1 tablet (875 mg total) by mouth 2 (two) times a day for 7 days. 14 tablet 0     cholecalciferol, vitamin D3, 1,000 unit tablet Take 2 tablets (2,000 Units total) by mouth daily. 180 tablet 3     metFORMIN (GLUCOPHAGE) 500 MG tablet Take 1 tablet (500 mg total) by mouth 2 (two) times a day with meals. 60 tablet 3     metroNIDAZOLE (METROGEL) 0.75 % vaginal gel Insert into the vagina at bedtime for 7 days. 70 g 1     ondansetron (ZOFRAN) 4 MG tablet Take 1 tablet (4 mg total) by mouth daily as needed for nausea. 15 tablet 1     prenatal no115-iron-folic acid 29 mg iron- 1 mg Chew Chew 1 tablet daily. 90 tablet 3     No current facility-administered medications for this visit.        No Known Allergies    EXAM  Vitals:    03/28/19 1358   BP: 116/76   Patient  "Site: Left Arm   Patient Position: Sitting   Cuff Size: Adult Regular   Pulse: 95   Temp: 98.1  F (36.7  C)   TempSrc: Tympanic   SpO2: 99%   Weight: 194 lb 11.2 oz (88.3 kg)   Height: 5' 1.54\" (1.563 m)       Exam done with Lili Paz medical assistant in room  General: alert, no distress  HEENT: sclerae anicteric, moist oral mucosa  Heart: Regular rate and rhythm, no murmurs.  No pretibial edema.  Warm extremities  Lungs: Clear to auscultation bilat  Gastrointestinal: abdomen is  non-distended.    Skin: warm/dry, no rashes  Neuro: no gross abnormalities  Gynecologic: External genitalia normal in appearance, thick white malodorous discharge in vaginal vault, cervix visualized with medium size speculum, Pap smear submitted.  No abnormalities.    Recent Results (from the past 24 hour(s))   Wet Prep, Vaginal    Collection Time: 03/28/19  2:33 PM   Result Value Ref Range    Yeast Result No yeast seen No yeast seen    Trichomonas No Trichomonas seen No Trichomonas seen    Clue Cells, Wet Prep Clue cells seen (!) No Clue cells seen   Glycosylated Hemoglobin A1c    Collection Time: 03/28/19  3:05 PM   Result Value Ref Range    Hemoglobin A1c 5.6 3.5 - 6.0 %   HM2(CBC w/o Differential)    Collection Time: 03/28/19  3:05 PM   Result Value Ref Range    WBC 4.1 4.0 - 11.0 thou/uL    RBC 5.28 3.80 - 5.40 mill/uL    Hemoglobin 13.6 12.0 - 16.0 g/dL    Hematocrit 41.3 35.0 - 47.0 %    MCV 78 (L) 80 - 100 fL    MCH 25.8 (L) 27.0 - 34.0 pg    MCHC 33.0 32.0 - 36.0 g/dL    RDW 12.1 11.0 - 14.5 %    Platelets 287 140 - 440 thou/uL    MPV 7.3 7.0 - 10.0 fL        Donis Quiroga DO  Internal Medicine  [facility]  "

## 2021-05-27 NOTE — PROGRESS NOTES
Mental Health Visit Note    4/15/2019      Start time: 10:00 AM    Stop Time: 11:00 AM   Session # 5    Sakshi White is a 24 y.o. female is being seen today for a follow-up psychotherapy appointment    Chief Complaint   Patient presents with      Follow Up     pt grieving over illness of friend, triggering memories of grandmothers death     Depression   .   Persons present: Sakshi White and Caleb Machado Joshrivas    New symptoms or complaints:  some anxiety and depression over father sharing that she had emotional problems with her sister. Pt thought that was violation of confidentiality.  and no social support aside from unemployed boyfriend.     Functional Impairment:   The patient identifies the how daily stressors affect daily functioning in today's session as follows (Scale is 1-4, 1=not at all or rarely; 4=extremely so/everyday.)    Personal: 3   Family: 2  Social: 3  Work: 2    Clinical assessment of mental status:   Sakshi White presented on time. No change since last session 4/8/19.  She was oriented x3, open and cooperative, and dressed appropriately for this session and weather. Her memory was Normal cognitive functioning .  Her speech was  Within normal.  Language was linear and concrete.  Concentration and focus is Within normal. Psychosis is not noted or reported. She reports her mood is Congruent w/content of speech and Tearful.  Affect is congruent with speech and is Congruent w/content of speech and Tearful.  Fund of knowledge is adequate. Insight is adequate for therapy.     Suicidal/Homicidal Ideation present:   No,  Patient denies suicidal and homicidal ideations/means or plans.        Patient's impression of their current status:  Pt reported feeling happy that family  Members celebrated her birthday with her. She felt cared for. She noted need to connect with others to talk through things and has hard time finding people to talk with. She notes younger bro is difficult to deal  harsha and creates stress in her life. She was asked to carry him on her phone contract and feels taken advantage of. She is grieving terminal illness of work friend and will make efforts to visit her to let her know how much she loves. Her.    Reviewed assertive communication again as way of increasing her confidence in ability to talk through things and express wants and needs.    Therapist impression of patient's current state:   Sakshi White presents with less anxiety and cont to be motivated about working toward goals. Developing rapport with therapist and sharing thoughts, concerns and coping skills. Reports therapy helpful in gaining insights and feeling understood and heard.     Type of psychotherapeutic technique provided:   Client centered and CBT    Progress toward short term goals:Progress as expected, she attended follow up session    Review of long term goals: maintain employment, earn money, manage depression and anxiety, develop support network as possible. Use assertive communication    Diagnosis:   1. Moderate episode of recurrent major depressive disorder (H)    2. BRONSON (generalized anxiety disorder)    No Change.     Plan and Follow-up:   Patient will follow safety plan of seeking help from friend/family, calling [facility], [facility], calling 911, and/or presenting to the ED if necessary.  Patient will be compliant with medications and attend appointments as scheduled.  Pt will practice pos vis med  Pt will call [facility] to inquire about membership.  Maintain employment    Discharge Criteria/Planning: Patient will continue with follow-up until therapy can be discontinued without return of signs and symptoms.    Signatures:   Performed and documented by Caleb Otero, [place], LICSW, LADC

## 2021-05-29 NOTE — TELEPHONE ENCOUNTER
Pt started having vaginal discharge last evening and in to today.  Pt states itching/irritation.  Pt scheduled for OV tomorrow 6/24/19 @ 3:40 pm.  Brianna Ashford RN, [facility] RN Triage Nurse Advisor    Reason for Disposition    Abnormal color vaginal discharge (i.e., yellow, green, gray)    Protocols used: VAGINAL [id number]

## 2021-05-29 NOTE — PROGRESS NOTES
[facility] Clinic Note    Patient Name: Sakshi White  Patient Age: 24 y.o.  YOB: 1995  MRN: [id number]    Date of visit: 6/24/2019    Assessment/Plan:  No results found for this or any previous visit (from the past 24 hour(s)).  Medications Ordered   Medications     fluconazole (DIFLUCAN) 150 MG tablet     Sig: Take 1 tablet (150 mg total) by mouth every third day for 2 doses.     Dispense:  2 tablet     Refill:  0       ICD-10-CM    1. Acute vaginitis N76.0 fluconazole (DIFLUCAN) 150 MG tablet     Wet Prep, Vaginal       Declined STD testing as well as pregnancy test.  She is tolerated Diflucan in the past, we discussed that it is not ideal pregnancy but I think she is low risk considering her history.  I did prescribe her Diflucan I gave her a extra dose in case she does not have complete symptom relief after 1.  Will await wet prep as well.      Patient Instructions   If symptoms not improving, let me know      Counseled patient regarding treatments, treatment options, risks and benefits and diagnosis.  The patient was interactive, attentive, verbalized understanding, and we discussed plan.       Patient Active Problem List   Diagnosis     PCOS (polycystic ovarian syndrome)     Hirsutism     Vitamin D deficiency     Obesity (BMI 30-39.9)     Primary anovulatory infertility     Migraine without aura and without status migrainosus, not intractable     Gastroesophageal reflux disease without esophagitis     Social History     Social History Narrative    Patient lives with her boyfriend.  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     Family History   Problem Relation Age of Onset     Hypertension Mother      Kidney disease Mother      Diabetes Father      Hypertension Father      Cancer Maternal Grandmother         unsure type, organ failure, used to be drug addict     Diabetes Paternal Grandmother      Vision loss Paternal Grandmother      No  "Medical Problems Sister      No Medical Problems Brother      No Medical Problems Brother      No Medical Problems Sister      No Medical Problems Sister      No Medical Problems Sister      Outpatient Encounter Medications as of 6/24/2019   Medication Sig Dispense Refill     cholecalciferol, vitamin D3, 1,000 unit tablet Take 2 tablets (2,000 Units total) by mouth daily. 180 tablet 3     ibuprofen (ADVIL,MOTRIN) 800 MG tablet Take 800 mg by mouth every 6 (six) hours as needed for pain.       metFORMIN (GLUCOPHAGE) 500 MG tablet Take 1 tablet (500 mg total) by mouth 2 (two) times a day with meals. 60 tablet 3     omeprazole (PRILOSEC) 20 MG capsule Take 1 capsule (20 mg total) by mouth daily as needed. 90 capsule 3     ondansetron (ZOFRAN) 4 MG tablet Take 1 tablet (4 mg total) by mouth daily as needed for nausea. 15 tablet 1     prenatal no115-iron-folic acid 29 mg iron- 1 mg Chew Chew 1 tablet daily. 90 tablet 3     ranitidine (ZANTAC) 150 MG tablet Take 1-2 tablets (150-300 mg total) by mouth 2 (two) times a day. 90 tablet 3     fluconazole (DIFLUCAN) 150 MG tablet Take 1 tablet (150 mg total) by mouth every third day for 2 doses. 2 tablet 0     No facility-administered encounter medications on file as of 6/24/2019.        Chief Complaint:   Chief Complaint   Patient presents with     Vaginal Itching     discharge       /64 (Patient Site: Left Arm, Patient Position: Sitting, Cuff Size: Adult Large)   Pulse 86   Ht 5' 1\" (1.549 m)   Wt 189 lb (85.7 kg)   SpO2 99%   BMI 35.71 kg/m    HPI:   Pruritis and white cottage cheese discharge from vagina.  Started Saturday.  Would like diflucan.  No dysuria or urinary symptoms.    ROS: Pertinent ros findings in hpi, all other systems negative.    Objective/Physical Exam:     /64 (Patient Site: Left Arm, Patient Position: Sitting, Cuff Size: Adult Large)   Pulse 86   Ht 5' 1\" (1.549 m)   Wt 189 lb (85.7 kg)   SpO2 99%   BMI 35.71 kg/m      Chaperoned " exam:    Speculum exam:    Skin near the vaginal canal examined, no rash   Vaginal mucosa pink, no lesions   Cervix: no focal lesions, not_friable, closed os with no foul discharge  Vaginal discharge not_malodorous, clear/white cottage cheese like      abd nttp  Javier Pan MD

## 2021-05-29 NOTE — PROGRESS NOTES
[facility] Clinic Note    Patient Name: Sakshi White  Patient Age: 24 y.o.  YOB: 1995  MRN: [id number]    Date of visit: 5/23/2019    Assessment/Plan:    Problem List Items Addressed This Visit     None      Visit Diagnoses     Acute sinusitis, recurrence not specified, unspecified location    -  Primary    Relevant Medications    amoxicillin-clavulanate (AUGMENTIN) 875-125 mg per tablet    benzonatate (TESSALON) 100 MG capsule          Medications Ordered   Medications     amoxicillin-clavulanate (AUGMENTIN) 875-125 mg per tablet     Sig: Take 1 tablet by mouth 2 (two) times a day for 7 days.     Dispense:  14 tablet     Refill:  0     benzonatate (TESSALON) 100 MG capsule     Sig: Take 1 capsule (100 mg total) by mouth 3 (three) times a day as needed for cough.     Dispense:  30 capsule     Refill:  0       Likely viral, use augmentin if not improving over next 2-3 days.    Patient Instructions     May use sinus rinse (i.e. neti) with sterile water (bottled or tap water boiled x 10 minutes and cooled to room temperature) 2-3x daily for nasal congestion  May use flonase 1-2 sprays each nostril daily alone or after sinus rinse. Have head look down and direct spray upward and slightly lateral of midline. Discontinue if nosebleeds.   Alternatively, may use 4 sprays flonase in twice daily rinse or 6 sprays in once daily rinse.     Use antibiotic if sinusitis not improving after 10 days, improves and then worsens, or sinus pain lateralizes or develop focal pain with temp >101.           Counseling included treatments, treatment options, risks and benefits and diagnosis.  The patient was interactive, attentive, verbalized understanding, and we discussed plan.     If condition worsens or you experience symptoms that are concerning to you, seek medical care immediately.      Patient Active Problem List   Diagnosis     PCOS (polycystic ovarian syndrome)     Hirsutism     Vitamin D deficiency     Obesity (BMI  30-39.9)     Primary anovulatory infertility     Migraine without aura and without status migrainosus, not intractable     Gastroesophageal reflux disease without esophagitis     Social History     Social History Narrative    Patient lives with her boyfriend.  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     Family History   Problem Relation Age of Onset     Hypertension Mother      Kidney disease Mother      Diabetes Father      Hypertension Father      Cancer Maternal Grandmother         unsure type, organ failure, used to be drug addict     Diabetes Paternal Grandmother      Vision loss Paternal Grandmother      No Medical Problems Sister      No Medical Problems Brother      No Medical Problems Brother      No Medical Problems Sister      No Medical Problems Sister      No Medical Problems Sister        Outpatient Encounter Medications as of 5/23/2019   Medication Sig Dispense Refill     cholecalciferol, vitamin D3, 1,000 unit tablet Take 2 tablets (2,000 Units total) by mouth daily. 180 tablet 3     ibuprofen (ADVIL,MOTRIN) 800 MG tablet Take 800 mg by mouth every 6 (six) hours as needed for pain.       metFORMIN (GLUCOPHAGE) 500 MG tablet Take 1 tablet (500 mg total) by mouth 2 (two) times a day with meals. 60 tablet 3     omeprazole (PRILOSEC) 20 MG capsule Take 1 capsule (20 mg total) by mouth daily as needed. 90 capsule 3     ondansetron (ZOFRAN) 4 MG tablet Take 1 tablet (4 mg total) by mouth daily as needed for nausea. 15 tablet 1     prenatal no115-iron-folic acid 29 mg iron- 1 mg Chew Chew 1 tablet daily. 90 tablet 3     ranitidine (ZANTAC) 150 MG tablet Take 1-2 tablets (150-300 mg total) by mouth 2 (two) times a day. 90 tablet 3     amoxicillin-clavulanate (AUGMENTIN) 875-125 mg per tablet Take 1 tablet by mouth 2 (two) times a day for 7 days. 14 tablet 0     benzonatate (TESSALON) 100 MG capsule Take 1 capsule (100 mg total) by mouth 3 (three)  "times a day as needed for cough. 30 capsule 0     No facility-administered encounter medications on file as of 5/23/2019.        Chief Complaint:   Chief Complaint   Patient presents with     URI     x1 wk       HPI:   Duration:7 days    Cough: Yes  Sore throat: improved  Rhinitis/nasal congestion: yes, staying about the same  Sinus pressure/pain: no  Otalgia: no  Fever: no    History of Asthma or other lung disease: no      ROS: Pertinent ros findings in hpi, all other systems negative.    Objective/Physical Exam:     /76 (Patient Site: Left Arm, Patient Position: Sitting, Cuff Size: Adult Large)   Pulse (!) 106   Temp 98.8  F (37.1  C) (Tympanic)   Resp 16   Ht 5' 1.5\" (1.562 m)   Wt 200 lb 8 oz (90.9 kg)   LMP 05/23/2018 Comment: Irreg Due to PCOS  SpO2 98%   BMI 37.27 kg/m      Heart: Regular rhythm, no rubs or gallops.  Normal rate: y  Murmur: n    Lungs:   Clear to auscultation bilaterally: y  Wheeze: n  Rhonchi: n  Crackles: n  Area of decreased breath sounds: n  Labored breathing: n      Left eye:  Conjunctival erythema: n  Purulent drainage: n  Proptosis:n    Right eye:  Conjunctival erythema: n  Purulent drainage: n  Proptosis: n    Right tympanic membrane:   color: pale  ossicles visualized: y  umbo distorted: n  cone of light distorted: n  Air/fluid levels: n  Drainage:n  Canal: not edematous no discharge  Pain with external ear manipulation: n  Foreign body: n    Left tympanic membrane:   color: pale  ossicles visualized: y  umbo distorted: n  cone of light distorted: n  Air/fluid levels: n  Drainage: n  Canal: not edematous no discharge  Pain with external ear manipulation: n  Foreign body: n    No auricular protrusion or erythema/swelling over mastoid area bilaterally.    No white patches that scrape off, no deviation of uvula. no ulcerations noted.    No torticollis, neck stiffness, drooling, muffled/hot potato voice, submandibular swelling, trismus or stridor.    Pharynx:   Erythema: n "   Pus pockets: n  Tender cervical lymphadenopathy: n    Well appearing: y  Moist mucous membranes: y    MSK: no muscle or joint swelling  Neuro: no dysarthria or gross asymmetry  Psych: full affect, oriented x 3  Hematologic: no petechiae or purpura    Skin: No rash.     Sinuses nttp, nares patent.    Javier Pan MD

## 2021-05-30 VITALS — HEIGHT: 61 IN | BODY MASS INDEX: 35.87 KG/M2 | WEIGHT: 190 LBS

## 2021-05-30 NOTE — TELEPHONE ENCOUNTER
Cough for 1 month.    Seen in May, given antibiotic    States she still has a dry cough, that is   Not getting better.     She is requesting an appointment    On Friday, at any clinic in [place].    An appointment at 3:00pm     @ [facility] with Francoise Duarte., [place].    Nuvia Kraft RN  [facility] Triage/refill nurse

## 2021-05-30 NOTE — PROGRESS NOTES
[facility] Clinic Note    Sakshi White   24 y.o. adult    Date of Visit: 7/8/2019  Chief Complaint   Patient presents with     Hand Pain     started yesterday morning, no injury, from pinky to wrist        ASSESSMENT/PLAN  1. Right wrist pain  XR Wrist Right 3 or More VWS    Ambulatory referral to Orthopedics    gabapentin (NEURONTIN) 100 MG capsule   2. Ulnar neuritis, right  XR Wrist Right 3 or More VWS    Ambulatory referral to Orthopedics     ---------------------------------------------    Sakshi has a history of right radial/carpal wrist pain which has bothered her off and on, but has a more severe and acute pain on the right ulnar/carpal joint with some intermittent neuritis in the ulnar distribution of the hand, pain with certain motions of the wrist.  Exam was completely unremarkable.  This is concerning for a ligamentous overuse injury with some ulnar nerve irritation.  No clear precipitating cause.  Check x-ray.  I think she would be better served with seeing an orthopedic hand specialist given the chronicity of the radiocarpal joint pain and this new ulnar/carpal joint pain.    Gabapentin prescribed if needed, she should try at bedtime to make sure she knows how she responds to this medication first.  Ibuprofen has not been helpful.    Return in about 3 months (around 10/8/2019) for Recheck.      SUBJECTIVE    Sakshi White is a 22-year-old woman who presents for right hand pain x1 day.    She called into nurse triage today, she described the pain on the outer aspect of her hand from the pinky to the wrist, moving the hand at certain angles makes it worse.  She wonders if this is carpal tunnel, has tried ice (made worse), ibuprofen 800 mg (did not help), and stretching.  The pain is rated at 8/10.  It hurts making fist or opening it up.  No prior history of this.      Right radialm   wrist pain has been going on since 2015. We have not discussed this much.  We got her a wrist brace before.   Bracing has helped in the past.      Her significant other Hugo is a another patient of mine, shoulder after it improved with therapy, plans to go back to [facility] for this.    ROS:   Per HPI, all other systems negative     Medications, allergies, and problem list were reviewed and updated    Patient Active Problem List   Diagnosis     PCOS (polycystic ovarian syndrome)     Hirsutism     Vitamin D deficiency     Obesity (BMI 30-39.9)     Primary anovulatory infertility     Migraine without aura and without status migrainosus, not intractable     Gastroesophageal reflux disease without esophagitis     Past Medical History:   Diagnosis Date     Depression     talk therapy     PCOS (polycystic ovarian syndrome)      Secondary amenorrhea      Vaginitis      Varicella     as child     Current Outpatient Medications   Medication Sig Dispense Refill     cholecalciferol, vitamin D3, 1,000 unit tablet Take 2 tablets (2,000 Units total) by mouth daily. 180 tablet 3     ibuprofen (ADVIL,MOTRIN) 800 MG tablet Take 800 mg by mouth every 6 (six) hours as needed for pain.       omeprazole (PRILOSEC) 20 MG capsule Take 1 capsule (20 mg total) by mouth daily as needed. 90 capsule 3     ondansetron (ZOFRAN) 4 MG tablet Take 1 tablet (4 mg total) by mouth daily as needed for nausea. 15 tablet 1     ranitidine (ZANTAC) 150 MG tablet Take 1-2 tablets (150-300 mg total) by mouth 2 (two) times a day. 90 tablet 3     gabapentin (NEURONTIN) 100 MG capsule Take 100 mg by mouth 3 (three) times a day as needed. 30 capsule 0     metFORMIN (GLUCOPHAGE) 500 MG tablet Take 1 tablet (500 mg total) by mouth 2 (two) times a day with meals. 60 tablet 3     prenatal no115-iron-folic acid 29 mg iron- 1 mg Chew Chew 1 tablet daily. 90 tablet 3     No current facility-administered medications for this visit.      No Known Allergies    EXAM  Vitals:    07/08/19 1544   BP: 108/58   Pulse: 80   Weight: 182 lb 12.8 oz (82.9 kg)         General:  Alert, no distress  Musculoskeletal: Right wrist and hand are grossly unremarkable.  There is no pain with palpation of the fifth metacarpal, metacarpal/ulnar joint.  She has full range of motion, but has discomfort with right wrist extension and flexion.  Neurologic: Full strength with finger abduction on the fingers of the right hand, as well as handgrip.    Results reviewed: X-ray of the right wrist requested    Data points: 1    Donis Quiroga DO  Internal Medicine  [facility]

## 2021-05-30 NOTE — TELEPHONE ENCOUNTER
Triage call:   Patient is calling with reports of right hand pain that started yesterday. Reports that they pain is located on the outer aspect of her hand from her pinky to her wrist. Patient reports that moving her hand at certain angles makes her pain worse- denies recent injuries or strenuous exercise. Reports that it feels almost like a nerve or maybe carpel tunnel- has had to wear a brace in the past. Has tried ice (made it worse), massage, ibuprofen (800/day)- didn't help, and stretching. Worse when moving certain angles.   Patient indicates slightly numbness in her hand as well right hand; currently rating pain 7-8/10- stabbing pain- constant. Denies pregnancy     Triaged to be seen within the next 3 days- reviewed additional care advice with patient verbalizes understanding. Patient warm transferred to scheduling for appointment. Appointment scheduled for today @ 3:40 pm with PCP.     Kirsten Mckeon RN BSBA [facility] Triage/Med Refill 7/8/2019 10:45 AM     Reason for Disposition    Patient wants to be seen    Protocols used: HAND AND WRIST PAIN-A-OH

## 2021-05-31 NOTE — PROGRESS NOTES
"  Subjective:    Sakshi White is a 24 y.o. adult who presents for evaluation of cough.  Cough first started in May.  She was seen by a provider on 5/23/19.  I reviewed that note today.  She was diagnosed with sinusitis, treated with Augmentin.  She states she took the entire course of antibiotic.  She felt better for about a week after she finished the medicine, then started feeling bad again.  She has a dry cough, worst at night.  No fevers.  She has tried a lot of OTC cough/cold medicines but they have not been helpful.  She describes her primary symptom as her throat burning or dry. No ear pain, no congestion.  She has Zantac for GERD, takes as needed.  Apparently takes omeprazole if Zantac \"doesn't work,\" but doesn't take Zantac regularly.  She doesn't think her symptoms now are related to GERD.  She has had 2 other office visits and an ER visit since she was seen for her cough, and URI symptoms were not mentioned at any of those visits.    Patient Active Problem List   Diagnosis     PCOS (polycystic ovarian syndrome)     Hirsutism     Vitamin D deficiency     Obesity (BMI 30-39.9)     Primary anovulatory infertility     Migraine without aura and without status migrainosus, not intractable     Gastroesophageal reflux disease without esophagitis       Current Outpatient Medications:      cholecalciferol, vitamin D3, 1,000 unit tablet, Take 2 tablets (2,000 Units total) by mouth daily., Disp: 180 tablet, Rfl: 3     omeprazole (PRILOSEC) 20 MG capsule, Take 1 capsule (20 mg total) by mouth daily as needed., Disp: 90 capsule, Rfl: 3     ranitidine (ZANTAC) 150 MG tablet, Take 1-2 tablets (150-300 mg total) by mouth 2 (two) times a day., Disp: 90 tablet, Rfl: 3     gabapentin (NEURONTIN) 100 MG capsule, Take 100 mg by mouth 3 (three) times a day as needed., Disp: 30 capsule, Rfl: 0     ibuprofen (ADVIL,MOTRIN) 800 MG tablet, Take 800 mg by mouth every 6 (six) hours as needed for pain., Disp: , Rfl:      metFORMIN " (GLUCOPHAGE) 500 MG tablet, Take 1 tablet (500 mg total) by mouth 2 (two) times a day with meals., Disp: 60 tablet, Rfl: 3     ondansetron (ZOFRAN) 4 MG tablet, Take 1 tablet (4 mg total) by mouth daily as needed for nausea., Disp: 15 tablet, Rfl: 1     prenatal no115-iron-folic acid 29 mg iron- 1 mg Chew, Chew 1 tablet daily., Disp: 90 tablet, Rfl: 3     Objective:   Allergies:  Patient has no known allergies.    Vitals:  Vitals:    08/02/19 1515   BP: 114/62   Patient Site: Right Arm   Patient Position: Sitting   Cuff Size: Adult Large   Pulse: 88   Resp: 16   Temp: 99.7  F (37.6  C)   TempSrc: Oral   Weight: 176 lb 12 oz (80.2 kg)     Body mass index is 33.4 kg/m .    Vital signs reviewed.  General: Patient is alert and oriented x 3, in no apparent distress  Ears: TMs are non-erythematous with good light reflex bilaterally  Throat: no erythema, edema or exudate noted  Lymphatic: no anterior cervical lymph node enlargement  Cardiac: regular rate and rhythm, no murmurs  Pulmonary: lungs clear to auscultation bilaterally, no crackles, rales, rhonchi, or wheezing noted      Assessment and Plan:   1. Throat irritation.  Differential includes GERD, post-nasal drip/allergies, viral pharyngitis, versus other.  I do not think a second antibiotic would be appropriate at this time.  I suggested taking 300 mg Zantac daily for 1 week and monitoring symptoms.  She is skeptical that GERD is causing her symptoms, so I am not sure if she will try this.  I also reviewed trial of allergy medicine.  Follow-up with PCP in 1 week if no better, sooner if worsening or other concerns.    This dictation uses voice recognition software, which may contain typographical errors.

## 2021-06-01 VITALS — BODY MASS INDEX: 35.5 KG/M2 | HEIGHT: 61 IN | WEIGHT: 188 LBS

## 2021-06-01 VITALS — BODY MASS INDEX: 36.09 KG/M2 | WEIGHT: 191 LBS

## 2021-06-02 VITALS — BODY MASS INDEX: 36.47 KG/M2 | WEIGHT: 193.2 LBS | HEIGHT: 61 IN

## 2021-06-02 VITALS — WEIGHT: 194.7 LBS | HEIGHT: 62 IN | BODY MASS INDEX: 35.83 KG/M2

## 2021-06-03 VITALS — HEIGHT: 62 IN | WEIGHT: 200.5 LBS | BODY MASS INDEX: 36.9 KG/M2

## 2021-06-03 VITALS — BODY MASS INDEX: 33.4 KG/M2 | WEIGHT: 176.75 LBS

## 2021-06-03 VITALS — BODY MASS INDEX: 34.54 KG/M2 | WEIGHT: 182.8 LBS

## 2021-06-03 VITALS — WEIGHT: 189 LBS | HEIGHT: 61 IN | BODY MASS INDEX: 35.68 KG/M2

## 2021-06-04 VITALS
SYSTOLIC BLOOD PRESSURE: 98 MMHG | BODY MASS INDEX: 34.93 KG/M2 | WEIGHT: 185 LBS | DIASTOLIC BLOOD PRESSURE: 70 MMHG | HEIGHT: 61 IN | HEART RATE: 64 BPM

## 2021-06-04 VITALS
RESPIRATION RATE: 12 BRPM | BODY MASS INDEX: 35.73 KG/M2 | HEART RATE: 91 BPM | WEIGHT: 189.1 LBS | DIASTOLIC BLOOD PRESSURE: 78 MMHG | SYSTOLIC BLOOD PRESSURE: 119 MMHG | OXYGEN SATURATION: 97 % | TEMPERATURE: 97.9 F

## 2021-06-04 VITALS
WEIGHT: 181 LBS | DIASTOLIC BLOOD PRESSURE: 66 MMHG | SYSTOLIC BLOOD PRESSURE: 104 MMHG | BODY MASS INDEX: 34.17 KG/M2 | HEIGHT: 61 IN | HEART RATE: 80 BPM

## 2021-06-04 NOTE — TELEPHONE ENCOUNTER
Refill Approved    Rx renewed per Medication Renewal Policy. Medication was last renewed on 3/28/19.    Carie Bray, [facility] Triage/Med Refill 12/31/2019     Requested Prescriptions   Pending Prescriptions Disp Refills     ranitidine (ZANTAC) 150 MG tablet [Pharmacy Med Name: raNITIdine HCl Oral Tablet 150 MG] 90 tablet 0     Sig: TAKE 1-2 TABLETS BY MOUTH TWICE DAILY       GI Medications Refill Protocol Passed - 12/30/2019  8:36 AM        Passed - PCP or prescribing provider visit in last 12 or next 3 months.     Last office visit with prescriber/PCP: 7/8/2019 Donis Quiroga DO OR same dept: 7/8/2019 Donis Quiroga DO OR same specialty: 7/8/2019 Donis Quiroga DO  Last physical: 3/28/2019 Last MTM visit: Visit date not found   Next visit within 3 mo: Visit date not found  Next physical within 3 mo: Visit date not found  Prescriber OR PCP: Donis Quiroga DO  Last diagnosis associated with med order: 1. Gastroesophageal reflux disease without esophagitis  - ranitidine (ZANTAC) 150 MG tablet [Pharmacy Med Name: raNITIdine HCl Oral Tablet 150 MG]; TAKE 1-2 TABLETS BY MOUTH TWICE DAILY  Dispense: 90 tablet; Refill: 0    If protocol passes may refill for 12 months if within 3 months of last provider visit (or a total of 15 months).

## 2021-06-04 NOTE — TELEPHONE ENCOUNTER
"  Reason for Disposition    [1] Symptoms of a \"yeast infection\" (i.e., itchy, white discharge, not bad smelling) AND [2] not improved > 3 days following CARE ADVICE    Protocols used: VAGINAL SZRFCELHA-U-MH    "

## 2021-06-04 NOTE — TELEPHONE ENCOUNTER
Asking for refill of prescription fluconazole.    Having white discharge and itchy vaginal area since last Thursday.    Pharmacy confirmed.    Ok to leave detailed message    Kamilla Pratt RN Triage and refills

## 2021-06-05 ENCOUNTER — HEALTH MAINTENANCE LETTER (OUTPATIENT)
Age: 26
End: 2021-06-05

## 2021-06-05 VITALS
SYSTOLIC BLOOD PRESSURE: 117 MMHG | WEIGHT: 195.75 LBS | BODY MASS INDEX: 33.42 KG/M2 | DIASTOLIC BLOOD PRESSURE: 77 MMHG | HEART RATE: 88 BPM | HEIGHT: 64 IN | TEMPERATURE: 98.2 F

## 2021-06-06 NOTE — TELEPHONE ENCOUNTER
Pt was scheduled with Dr Bennett today   Dr Bennett states if sever pain all the time not just with sex go to ER  If pain is just during sex see Midwife for OBGYN      Pt states that the pain is just with Sex and re-scheduled her with Dr Vergara today at [facility]

## 2021-06-06 NOTE — PROGRESS NOTES
"Assessment:   1.  Left lower quadrant abdominal pain during sexual intercourse last night, RESOLVED  2.  History of PCOS  3.  Secondary amenorrhea likely due to PCOS process  4.  STD screening  5.  Healthcare maintenance     Plan:      1. Discussed nutrition and exercise.  Advised MVI, Vitamin D3 4000IU geltab and an omega 3 supplement daily   2. Blood tests: UA with UC if indicated, urine pregnancy test, wet prep, GC/CT culture.  3.  Pelvic ultrasound ordered.  4. Contraception: None  5.  Next pap due March 2022.   6.  RTC in 1 to 2 weeks in order to discuss secondary amenorrhea and management of PCOS or sooner PRN    Total time spent with patient: 40 minutes greater than 50% of which was spent counseling and coordinating care    Subjective:      Sakshi White is a 24 y.o. female who presents for an acute visit: Left lower quadrant abdominal pain described as \"stabbing\" during sexual intercourse last night lasting about 1 hour, \"I cried myself to sleep\".    Denies fever, chills, vaginal bleeding, dysuria or unusual vaginal discharge.  No concern regarding infidelity.  History of BV and PCOS.  Last menstrual period about 2 years ago: May 4, 2018      Immunization History   Administered Date(s) Administered     Dtap 1993, 1993, 04/10/1994, 07/25/1997, 07/29/1998     HPV Quadrivalent 12/04/2008, 11/19/2010, 02/16/2012     Hep A, historic 12/04/2008     Hep B, Peds or Adolescent 07/25/1997     Hep B, Peds, Historic 07/25/1997     Hep B, historic 04/12/1996, 07/15/1996     Hepatitis A, Ped/Adol 2 Dose IM (18yr & under) 12/04/2008     Hib (PRP-OMP) 1993, 1993, 02/10/1994     IPV 1993, 1993, 07/25/1997, 07/29/1998     Influenza, Live, Nasal LAIV3 12/04/2008     Influenza, Seasonal, Inj PF IIV3 10/28/2014     Influenza, inj, historic,unspecified 12/04/2008, 11/19/2010, 11/28/2014     Influenza,seasonal, Inj IIV3 11/19/2010, 11/28/2014     MMR 09/12/1994, 07/25/1997     Meningococcal " MCV4P 2008     Meningococcal,Historic,Unknown serogroups 2008     OPV,Trivalent,Historic(7006-3003 only) 1997     Td, adult adsorbed, PF 2020     Tdap 2005, 2008     Varicella 1997, 2008     Immunization status: due today.    Gynecologic History  No LMP recorded.  Contraception: none    Cancer screening:   Last Pap: 3/28/2019. Results were: normal    OB History    Para Term  AB Living   0 0 0 0 0 0   SAB TAB Ectopic Multiple Live Births   0 0 0 0 0       Current Outpatient Medications   Medication Sig Dispense Refill     cholecalciferol, vitamin D3, 1,000 unit tablet Take 2 tablets (2,000 Units total) by mouth daily. 180 tablet 3     ibuprofen (ADVIL,MOTRIN) 800 MG tablet Take 800 mg by mouth every 6 (six) hours as needed for pain.       omeprazole (PRILOSEC) 20 MG capsule Take 1 capsule (20 mg total) by mouth daily as needed. 90 capsule 3     ranitidine (ZANTAC) 150 MG tablet TAKE 1-2 TABLETS BY MOUTH TWICE DAILY 180 tablet 1     metroNIDAZOLE (METROGEL) 0.75 % vaginal gel INSERT INTO THE VAGINA AT BEDTIME FOR 7 DAYS       ondansetron (ZOFRAN-ODT) 4 MG disintegrating tablet DIS 1 T ON THE TONGUE Q 8 H FOR UP TO 14 DAYS PRF NAUSEA       prenatal no115-iron-folic acid 29 mg iron- 1 mg Chew Chew 1 tablet daily. 90 tablet 3     SF 5000 PLUS 1.1 % Crea Sodium fluoride toothpaste       No current facility-administered medications for this visit.      Past Medical History:   Diagnosis Date     Depression     talk therapy     PCOS (polycystic ovarian syndrome)      Secondary amenorrhea      Vaginitis      Varicella     as child     Past Surgical History:   Procedure Laterality Date     PELVIC LAPAROSCOPY  2015     Patient has no known allergies.  Family History   Problem Relation Age of Onset     Hypertension Mother      Kidney disease Mother      Depression Mother      Diabetes Father      Hypertension Father      Cancer Maternal Grandmother          unsure type, organ failure, used to be drug addict     Diabetes Paternal Grandmother      Vision loss Paternal Grandmother      Hypertension Paternal Grandmother      No Medical Problems Sister      No Medical Problems Brother      No Medical Problems Brother      No Medical Problems Sister      No Medical Problems Sister      No Medical Problems Sister      Social History     Socioeconomic History     Marital status: Single     Spouse name: Not on file     Number of children: Not on file     Years of education: Not on file     Highest education level: Not on file   Occupational History     Employer: [employer]   Social Needs     Financial resource strain: Not on file     Food insecurity:     Worry: Not on file     Inability: Not on file     Transportation needs:     Medical: Not on file     Non-medical: Not on file   Tobacco Use     Smoking status: Never Smoker     Smokeless tobacco: Never Used   Substance and Sexual Activity     Alcohol use: No     Comment: 1-2x/year     Drug use: No     Sexual activity: Yes     Partners: Male     Birth control/protection: None     Comment: 2+ year monog   Lifestyle     Physical activity:     Days per week: Not on file     Minutes per session: Not on file     Stress: Not on file   Relationships     Social connections:     Talks on phone: Not on file     Gets together: Not on file     Attends Religion service: Not on file     Active member of club or organization: Not on file     Attends meetings of clubs or organizations: Not on file     Relationship status: Not on file     Intimate partner violence:     Fear of current or ex partner: Not on file     Emotionally abused: Not on file     Physically abused: Not on file     Forced sexual activity: Not on file   Other Topics Concern     Not on file   Social History Narrative    Patient lives with her boyfriend.  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       Review of  "Systems  General:  Denies problem  Eyes: Denies problem  Ears/Nose/Throat: Denies problem  Cardiovascular: Denies problem  Respiratory:  Denies problem  Gastrointestinal: Please see subjective  Genitourinary: Please see subjective  Musculoskeletal:  Denies problem  Skin: Denies problem  Neurologic:denies problems  Psychiatric: denies problems  Endocrine: denies problems        Objective:         Vitals:    02/12/20 1546   BP: 104/66   Pulse: 80   Weight: 181 lb (82.1 kg)   Height: 5' 1\" (1.549 m)       Physical Exam:  General Appearance: Alert, cooperative, no distress, appears stated age  Abdomen: Soft, non-tender. No organomegaly or masses  Pelvic:External genitalia normal without lesions or irritation. Vagina and cervix show no lesions, inflammation, discharge or tenderness. Uterus & adnexal normal without masses or tenderness.       "

## 2021-06-06 NOTE — PROGRESS NOTES
Assessment:   1.  PCOS  2.  Possible STD exposure  3.  Contraceptive management, status post Plan B on 2/23/2020  4.  STD screening  5.  Dysmenorrhea     Plan:      1. Ibuprofen 800 mg 1 p.o. every 6 hours as needed dysmenorrhea, #30, 1 refill.  2. Blood tests: GC/CT and wet prep cultures, HIV and RPR.  Quantitative beta-hCG today.  3. Breast self exam technique reviewed and patient encouraged to perform self-exam monthly.   4. Contraception: None.  Patient DECLINES combined oral contraceptive pills to treat PCOS.  5. Provera 10 mg 1 p.o. daily x10 days every 3 months in order to induce withdrawal bleed due to secondary amenorrhea from PCOS.  Prescription electronically sent to patient's preferred pharmacy: Provera 10 mg, #10, 3 refills. 6. RTC in 3 months after next withdrawal bleed.  7. Recommend repeat STD screening in 3 months: GC/CT, wet prep, HIV and RPR.    Total time spent with patient: 25 minutes greater than 50% of which was spent counseling and coordinating care      Subjective:      Sakshi White is a 24 y.o. female who presents for follow-up.   Ultrasound performed on 2/12/2020 showing evidence of PCOS bilaterally.  Patient admits to willing infidelity on 2/22/2020 for which she used Plan B on 2/23/2020.  LMP 3/3/2020.  Desires STD screening today.  Declines birth control method at this time as she and her partner [place] would welcome conception.  Requesting prescription for ibuprofen due to dysmenorrhea with menstrual period.  Denies symptoms of STD.      Immunization History   Administered Date(s) Administered     Dtap 1993, 1993, 04/10/1994, 07/25/1997, 07/29/1998     HPV Quadrivalent 12/04/2008, 11/19/2010, 02/16/2012     Hep A, historic 12/04/2008     Hep B, Peds or Adolescent 07/25/1997     Hep B, Peds, Historic 07/25/1997     Hep B, historic 04/12/1996, 07/15/1996     Hepatitis A, Ped/Adol 2 Dose IM (18yr & under) 12/04/2008     Hib (PRP-OMP) 1993, 1993, 02/10/1994      IPV 1993, 1993, 1997, 1998     Influenza, Live, Nasal LAIV3 2008     Influenza, Seasonal, Inj PF IIV3 10/28/2014     Influenza, inj, historic,unspecified 2008, 2010, 2014     Influenza,seasonal, Inj IIV3 2010, 2014     MMR 1994, 1997     Meningococcal MCV4P 2008     Meningococcal,Historic,Unknown serogroups 2008     OPV,Trivalent,Historic(1213-1347 only) 1997     Td, adult adsorbed, PF 2020     Tdap 2005, 2008     Varicella 1997, 2008     Immunization status: up to date and documented.    Gynecologic History  Patient's last menstrual period was 2020.  Contraception: none    Cancer screening:   Last Pap: 3/28/2019. Results were: normal      OB History    Para Term  AB Living   0 0 0 0 0 0   SAB TAB Ectopic Multiple Live Births   0 0 0 0 0       Current Outpatient Medications   Medication Sig Dispense Refill     cholecalciferol, vitamin D3, 1,000 unit tablet Take 2 tablets (2,000 Units total) by mouth daily. 180 tablet 3     ibuprofen (ADVIL,MOTRIN) 800 MG tablet Take 1 tablet (800 mg total) by mouth every 6 (six) hours as needed for pain. 30 tablet 1     omeprazole (PRILOSEC) 20 MG capsule Take 1 capsule (20 mg total) by mouth daily as needed. 90 capsule 3     ondansetron (ZOFRAN-ODT) 4 MG disintegrating tablet DIS 1 T ON THE TONGUE Q 8 H FOR UP TO 14 DAYS PRF NAUSEA       ranitidine (ZANTAC) 150 MG tablet TAKE 1-2 TABLETS BY MOUTH TWICE DAILY 180 tablet 1     SF 5000 PLUS 1.1 % Crea Sodium fluoride toothpaste       medroxyPROGESTERone (PROVERA) 10 MG tablet Take 1 tablet (10 mg total) by mouth daily for 10 days. 10 tablet 3     No current facility-administered medications for this visit.      Past Medical History:   Diagnosis Date     Depression     talk therapy     PCOS (polycystic ovarian syndrome)      Secondary amenorrhea      Vaginitis      Varicella     as child     Past  Surgical History:   Procedure Laterality Date     PELVIC LAPAROSCOPY  08/19/2015     Patient has no known allergies.  Family History   Problem Relation Age of Onset     Hypertension Mother      Kidney disease Mother      Depression Mother      Diabetes Father      Hypertension Father      Cancer Maternal Grandmother         unsure type, organ failure, used to be drug addict     Diabetes Paternal Grandmother      Vision loss Paternal Grandmother      Hypertension Paternal Grandmother      No Medical Problems Sister      No Medical Problems Brother      No Medical Problems Brother      No Medical Problems Sister      No Medical Problems Sister      No Medical Problems Sister      Social History     Socioeconomic History     Marital status: Single     Spouse name: Hugo     Number of children: 0     Years of education: Not on file     Highest education level: Not on file   Occupational History     Employer: [employer]   Social Needs     Financial resource strain: Not on file     Food insecurity:     Worry: Not on file     Inability: Not on file     Transportation needs:     Medical: Not on file     Non-medical: Not on file   Tobacco Use     Smoking status: Never Smoker     Smokeless tobacco: Never Used   Substance and Sexual Activity     Alcohol use: No     Comment: 1-2x/year     Drug use: No     Sexual activity: Yes     Partners: Male     Birth control/protection: None     Comment: 2+ year monog   Lifestyle     Physical activity:     Days per week: Not on file     Minutes per session: Not on file     Stress: Not on file   Relationships     Social connections:     Talks on phone: Not on file     Gets together: Not on file     Attends Holiness service: Not on file     Active member of club or organization: Not on file     Attends meetings of clubs or organizations: Not on file     Relationship status: Not on file     Intimate partner violence:     Fear of current or ex partner: Not on file     Emotionally abused: Not  "on file     Physically abused: Not on file     Forced sexual activity: Not on file   Other Topics Concern     Not on file   Social History Narrative    Patient lives with her boyfriend.  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       Review of Systems  General:  Denies problem  Eyes: Denies problem  Ears/Nose/Throat: Denies problem  Cardiovascular: Denies problem  Respiratory:  Denies problem  Gastrointestinal:  denies problems  Genitourinary: Please see subjective  Musculoskeletal:  Denies problem  Skin: Denies problem  Neurologic:denies problems  Psychiatric: denies problems  Endocrine: denies problems        Objective:         Vitals:    03/04/20 1407   BP: 98/70   Pulse: 64   Weight: 185 lb (83.9 kg)   Height: 5' 1\" (1.549 m)       Physical Exam:  General Appearance: Alert, cooperative, no distress, appears stated age  Pelvic:External genitalia normal without lesions or irritation.  Menstrual period apparent on GC/CT and wet prep cultures.  Sterile speculum exam deferred today.     US Pelvis With Transvaginal Non OB (Order [id number])   Imaging   Date: 2/12/2020 Department: [facility] Ultrasound Released By: Carie Villa Authorizing: Brii Vergara, NATALYA,GERRI   Study Result     EXAM: US PELVIS WITH TRANSVAGINAL NON OB  LOCATION: [facility]  DATE/TIME: 2/12/2020 5:59 PM     INDICATION: LLQ abdominal pain with sexual intercourse. History of polycystic ovary syndrome and vaginitis.  COMPARISON: Pelvic ultrasound 02/22/2018  TECHNIQUE: Transabdominal scans were performed. Endovaginal ultrasound was performed to better visualize the adnexa.     FINDINGS:     UTERUS: 7.3 x 2.8 x 3.5 cm. Normal in size and position with no masses. Cervical nabothian cysts.     ENDOMETRIUM: 5 mm. Normal smooth endometrium.     RIGHT OVARY: 3.5-2 0.8 x 3.1 cm. Numerous follicles predominantly in a peripheral distribution. Normal blood flow " demonstrated.     LEFT OVARY: 3.0 x 2.1 x 2.5 cm. Numerous follicles predominantly in a peripheral distribution. Normal blood flow demonstrated.      No significant free fluid.     IMPRESSION:   1.  Normal uterus.   2.  Cervical nabothian cysts.   3.  Numerous peripheral follicles both ovaries suggesting polycystic ovarian disease.

## 2021-06-06 NOTE — TELEPHONE ENCOUNTER
"C/O Pelvic discomfort/crammping,began last night precipitant appears to be sex last night  No menstral cycle 2 years due to PCOS        Severe pain, during sex \" stabbing pain\"  Pain shot to lower back to knee  No menstrual cycle 2 years  Never had this happen before  No bleeding  Warm transfer to  for appointment.   per protocol pt to be seen within 24 hrs,appt made for today  Carie Mensah RN  [place] Nurse Advisor    Reason for Disposition    [1] MODERATE (e.g., interferes with normal activities) pelvic pain AND [2] pain comes and goes (cramps) AND [3] present > 24 hours    Protocols used: PELVIC PAIN - FEMALE-A-AH      "

## 2021-06-07 NOTE — TELEPHONE ENCOUNTER
Symptom    Describe your symptoms:  Bilat eye injury R> L  Increase pain in light  Both eyes are Red- noticed yesterday    Had a Alessandro ball hit her in the face    Any pain: yes PS 1    New/Ongoing: New     How long have you been having symptoms: 3  Day(s)    Have you been seen for this:  No     Appointment offered?: Patient declines     Triage offered?: Yes, declined- wait times    Home remedies tried: IBU    Requested Pharmacy: [id number] - [place], [place] - [address]      Okay to leave a detailed message?   Yes     Advise patient to create a MY Chart message and attach pictures of the eyes for further assessment.    Please call to discuss the plan of care.

## 2021-06-07 NOTE — PROGRESS NOTES
"Subjective:   Sakshi White is a(n) 24 y.o. Black or  adult who presents to [facility] with the following complaint(s):  Eye Pain (states [facility] does not have any opening till thursday, Rt eye, sensitive to light, itchy and irritated, feels dry, no fever)    History of Present Illness:  Patient states was hit in the eye 4 days ago. Patient states it seems to be getting better, however still complains of light sensitivity, itching, pain, and discharge. Denies any foreign body sensation. Has been doing some ibuprofen to help with pain. Does wear contacts, but currently wearing glasses.     Patient also request pregnancy test. Denies abdominal pain, nausea, vomiting, dizziness. Patient states \"just wants to be checked.\"       Review of Systems:   Review of Systems   Constitutional: Negative for fever.   HENT: Negative for congestion and rhinorrhea.    Eyes: Positive for photophobia, pain, discharge, redness and itching. Negative for visual disturbance.   Respiratory: Negative for cough.    Gastrointestinal: Positive for nausea (initially). Negative for abdominal pain, diarrhea and vomiting.   Musculoskeletal: Negative for neck pain.   Neurological: Positive for headaches. Negative for dizziness, light-headedness and numbness.     Objective:     Vitals:    03/24/20 1248   BP: 119/78   Pulse: 91   Resp: 12   Temp: 97.9  F (36.6  C)   TempSrc: Oral   SpO2: 97%   Weight: 189 lb 1.6 oz (85.8 kg)     Physical Exam  Vitals signs and nursing note reviewed.   Constitutional:       Appearance: Normal appearance. She is well-developed and well-groomed.   HENT:      Head: Normocephalic and atraumatic.   Eyes:      General: Lids are everted, no foreign bodies appreciated.         Right eye: Discharge present. No foreign body or hordeolum.      Extraocular Movements: Extraocular movements intact.      Conjunctiva/sclera:      Right eye: Right conjunctiva is injected.      Left eye: Left conjunctiva is not " injected.      Pupils: Pupils are equal, round, and reactive to light.      Right eye: No corneal abrasion or fluorescein uptake.   Neck:      Musculoskeletal: Normal range of motion and neck supple.   Cardiovascular:      Rate and Rhythm: Normal rate and regular rhythm.      Heart sounds: Normal heart sounds.   Pulmonary:      Effort: Pulmonary effort is normal.      Breath sounds: Normal breath sounds and air entry.   Lymphadenopathy:      Head:      Right side of head: No submandibular or tonsillar adenopathy.      Left side of head: No submandibular or tonsillar adenopathy.      Cervical: No cervical adenopathy.   Skin:     General: Skin is warm and dry.      Capillary Refill: Capillary refill takes less than 2 seconds.   Neurological:      Mental Status: She is alert and oriented to person, place, and time.   Psychiatric:         Behavior: Behavior is cooperative.         Labs:  Results for VALENTINA YATES (MRN [id number]) as of 3/24/2020 13:46   Ref. Range 3/24/2020 13:37   Pregnancy Test, Urine Latest Ref Range: Negative  Negative         Assessment/Plan   Sakshi was seen today for eye pain.    Diagnoses and all orders for this visit:    Acute bacterial conjunctivitis of right eye  -     ciprofloxacin HCl (CILOXAN) 0.3 % ophthalmic solution; Administer 1 drop, every 2 hours, while awake, for 2 days. Then 1 drop, every 4 hours, while awake, for the next 5 days.    Encounter for pregnancy test, result unknown  -     Pregnancy, Urine        - Discussed with patient that no corneal abrasion was seen. Given history and exam will start her on ciprofloxacin drops. Additional symptomatic treatemnt recommendations discussed.   -Questions were answered. See AVS for the specific written instructions and educational handout(s) regarding conjunctivits that were provided at the conclusion of the visit.   - Discussed signs / symptoms that warrant urgent / emergent medical attention.   - Follow up 2 days if symptoms do not  improve or sooner if symptoms worsen.    Mauro Chavez, CNP

## 2021-06-07 NOTE — TELEPHONE ENCOUNTER
Spoke with pt and relayed pcp message.  Pt understanding.  Provided phone numbers for [facility] and [facility].

## 2021-06-10 NOTE — PROGRESS NOTES
Mental Health Visit Note    5/27/2017    Start time: 4:00 PM    Stop Time: 4:50 PM   Session # 2    Sakshi White is a 22 y.o. female is being seen today for a follow-up psychotherapy appointment    Chief Complaint   Patient presents with      Follow Up     Anxiety     Depression   .     New symptoms or complaints:  anxiety and depression over inability to get pregnant and stressors of work    Functional Impairment:   The patient identifies the how daily stressors affect daily functioning in today's session as follows (Scale is 1-4, 1=not at all or rarely; 4=extremely so/everyday.)    Personal: 3   Family: 4  Social: 3  Work: 2    Clinical assessment of mental status:   Sakshi White presented on time.   She was oriented x3, open and cooperative, and dressed appropriately for this session and weather. Her memory was Normal cognitive functioning .  Her speech was  Within normal.  Language was linear and concrete.  Concentration and focus is Within normal. Psychosis is not noted or reported. She reports her mood is Congruent w/content of speech and Tearful.  Affect is congruent with speech and is Congruent w/content of speech and Tearful.  Fund of knowledge is adequate. Insight is adequate for therapy.     Suicidal/Homicidal Ideation present:   No,  Patient denies suicidal and homicidal ideations/means or plans.        Patient's impression of their current status:  Pt working hard to figure out what will make her happy. 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 She is also wanting to get pregnant because she feels lonely and unfulfilled. She reports not close to family and feels like she lacks support.     Therapist impression of patient's current state:   Sakshi White presents with anxiety and is beginning to work on self by exploring thoughts and feelings and actions she can take to feel better about self and feel more in control of her life.    Type of  psychotherapeutic technique provided:   Client centered and CBT    Progress toward short term goals:Progress as expected, she attended follow up session    Review of long term goals: Not done at today's visit . Date of last review next session.    Diagnosis:   1. Moderate episode of recurrent major depressive disorder    No Change.     Plan and Follow-up:   Patient will follow safety plan of seeking help from friend/family, calling [facility], calling 911, and/or presenting to the ED if necessary.  Patient will be compliant with medications and attend appointments as scheduled.  Pt will call to schedule follow up session. She will read assertive communication information    Discharge Criteria/Planning: Patient will continue with follow-up until therapy can be discontinued without return of signs and symptoms.    Signatures:   Performed and documented by Caleb Otero, [place], LICSW, LADC          This note was created with help of Dragon dictation software. Grammatical / typing errors are not intentional and inherent to the software.

## 2021-06-10 NOTE — PROGRESS NOTES
[facility] Follow Up Note    Assessment/Plan:  1. Vaginal discharge  Wet prep is consistent with bacterial vaginosis.  We will start patient on metronidazole.  - Wet Prep, Vaginal    2. PCOS (polycystic ovarian syndrome) and problems with infertility  Pregnancy testing was negative.  We will repeat hormonal blood work.  We will check her A1c and if sugar is borderline I would have low threshold of starting her on metformin.  Weight loss was recommended.  When she is emotionally ready I did give her referral for into fertility clinic where she can follow-up for further treatments.  Patient had unsuccessful fertility treatments year and a half ago.  - Pregnancy (Beta-hCG, Qual), Urine  - Thyroid Stimulating Hormone (TSH)  - Prolactin  - Follicle Stimulating Hormone (FSH)  - Luteinizing Hormone (LH)  - Glycosylated Hemoglobin A1c  - Ambulatory referral to Infertility    3. Situational depression  I believe patient will follow up from psychology evaluation.  She has a lot of unresolved emotional feelings about inability to have children.  On follow-up will further determine if she needs medical treatment as well.  - Ambulatory referral to Psychology    4. Dysuria  We will screen for UTI  - Urinalysis-UC if Indicated    Camila Bennett MD    Chief Complaint:  Chief Complaint   Patient presents with     Vaginitis     discharge and odor x couple weeks       History of Present Illness:  Sakshi is a 22 y.o. female is history of increased BMI, PCO S, amenorrhea, hirsutism, insomnia, depression, recurrent Candida and BV infections who is currently here for acute visit due to increased vaginal order and discharge..    Symptoms started several days ago.  Patient has had recurrent yeast infections and bacterial vaginosis infections.  She had normal STD screen done several months ago and currently refuses a repeat.    Patient has history of PCO S and has not been menstruating.  In the past she was put on birth  "control but developed severe abdominal cramps on it and stopped it.  Because of the recurrent yeast infections and BV her PCP put an order for A1c test but it has not been done by patient.  Patient is obese.  She does have history of hirsutism.  She is up-to-date on her Pap smear.  Last year she really wanted to get pregnant and it sounds like she has been to gynecologist the reproductive clinic for treatment and it has not been effective.  She does appear to be depressed over this issue and was tearful talking about it.  She does want to have a child.  Currently we discussed that we will check A1c and if her sugars are borderline starting her on metformin may beneficial affect her menstrual cycle and potentially help with pregnancy.  Also recommended that she sees a psychologist to help deal with stress due to infertility and after that she might consider going back to infertility clinic (referral was provided).    Review of Systems:  A comprehensive review of systems was performed and was otherwise negative.  Patient had transient dysuria, we will check her urinalysis.    PFSH:  Social History: Reviewed  History   Smoking Status     Never Smoker   Smokeless Tobacco     Not on file     Social History     Social History Narrative    Patient lives with her boyfriend.  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       Past History: Reviewed  Current Outpatient Prescriptions   Medication Sig Dispense Refill     ibuprofen (ADVIL,MOTRIN) 800 MG tablet TAKE 1 TABLET(800 MG) BY MOUTH EVERY 8 HOURS AS NEEDED FOR PAIN 60 tablet 0     No current facility-administered medications for this visit.        Physical Exam:    Vitals:    05/01/17 1458   BP: 116/60   Patient Site: Left Arm   Patient Position: Sitting   Cuff Size: Adult Regular   Pulse: 88   Resp: 18   SpO2: 96%   Weight: 190 lb (86.2 kg)   Height: 5' 1\" (1.549 m)     Wt Readings from Last 3 Encounters: "   05/01/17 190 lb (86.2 kg)   11/10/16 190 lb 4.8 oz (86.3 kg)   09/28/16 190 lb (86.2 kg)     Body mass index is 35.9 kg/(m^2).    Constitutional:  Reveals a young female.  Vitals:  Per nursing notes.  HEENT:No cervical LAD, no thyromegaly,  conjunctiva is pink, no scleral icterus, TMs are visualized and normal bl, oropharynx is clear, no exudates,   Cardiac:  Regular rate and rhythm,no murmurs, rubs, or gallops Legs without edema. Palpation of the radial pulse regular.  Lungs: Clear to auscultation bl.  Respiratory effort normal.  Abdomen:positive BS, soft, nontender, nondistended.  No hepato-splenomagaly  Psychiatric: affect is flat, patient is tearful talking about inability to conceive    Data Review:    Analysis and Summary of Old Records (2): Yes    Records Requested (1): No    Other History Summarized (from other people in the room) (2): No    Radiology Tests Summarized (XRAY/CT/MRI/DXA) (1): No    Labs Reviewed (1): Yes    Medicine Tests Reviewed (EKG/ECHO/COLONOSCOPY/EGD) (1): No    Independent Review of EKG or X-RAY (2): No
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
## 2021-06-13 NOTE — PROGRESS NOTES
"Sakshi White is a 25 y.o. adult who is being evaluated via a billable telephone visit.      The patient has been notified of following:     \"This telephone visit will be conducted via a call between you and your physician/provider. We have found that certain health care needs can be provided without the need for a physical exam.  This service lets us provide the care you need with a short phone conversation.  If a prescription is necessary we can send it directly to your pharmacy.  If lab work is needed we can place an order for that and you can then stop by our lab to have the test done at a later time.    Telephone visits are billed at different rates depending on your insurance coverage. During this emergency period, for some insurers they may be billed the same as an in-person visit.  Please reach out to your insurance provider with any questions.    If during the course of the call the physician/provider feels a telephone visit is not appropriate, you will not be charged for this service.\"    Patient has given verbal consent to a Telephone visit? Yes    What phone number would you like to be contacted at? [phone]    Patient would like to receive their AVS by AVS Preference: Tyler.    Additional provider notes: has cough and fever for 5 days. Have herself tested 5 days ago and result came back only 2 days ago. Feels better except for intermittent fever. Not short of breath. Does not have fever anymore. Will need to self quarantine for a few more days. Cannot report for work until she is fully cured.     Assessment/Plan:  1. Clinical diagnosis of COVID-19  Had cough and fever for 5 days prior getting COVID test. Got tested for COVID 5 days ago and got her positive result 2 days ago. Still coughs intermittently. But denies fever and shortness of breath.   Advised to continue to self isolate for 5 more days. Advised she can report back to work on November 30, 2020. Will write a work statement for her " work.        Phone call duration:  12  minutes    Nuvia Maya, CMA

## 2021-06-13 NOTE — PROGRESS NOTES
Mental Health Visit Note    9/15/2017    Start time: 4:00 PM    Stop Time: 4:50 PM   Session # 2    Sakshi White is a 22 y.o. female is being seen today for a follow-up psychotherapy appointment    Chief Complaint   Patient presents with      Follow Up   .     New symptoms or complaints:  Significant anxiety and depression over financial difficulties and no social support aside from unemployed boyfriend.     Functional Impairment:   The patient identifies the how daily stressors affect daily functioning in today's session as follows (Scale is 1-4, 1=not at all or rarely; 4=extremely so/everyday.)    Personal: 3   Family: 4  Social: 3  Work: 2    Clinical assessment of mental status:   Sakshi White presented on time.   She was oriented x3, open and cooperative, and dressed appropriately for this session and weather. Her memory was Normal cognitive functioning .  Her speech was  Within normal.  Language was linear and concrete.  Concentration and focus is Within normal. Psychosis is not noted or reported. She reports her mood is Congruent w/content of speech and Tearful.  Affect is congruent with speech and is Congruent w/content of speech and Tearful.  Fund of knowledge is adequate. Insight is adequate for therapy.     Suicidal/Homicidal Ideation present:   No,  Patient denies suicidal and homicidal ideations/means or plans.        Patient's impression of their current status:  Pt was anxious and tearful but denied any suicidal ideation or intent. She feels overwhelmed with work and sx of depression which have led her to call in sick several days. She will lose apartment if unable to earn money by working full time but feels like giving up. Boyfriend unemployed but pt nervous about pushing him to work as he's her only emotional support. She reports other people tell her she's mean and don't have time for her. She is aware of ways she can irritate people by playing the victim role or being brutally honest in  making critical statements to others. She wants to earn money to live and lose weight.  she was encouraged to cont working despite depression sx and join [employer] to begin process of working toward goals. Also encouraged to note that sometimes being critical is more about having an opinion, expressing anger than about being honest.    Therapist impression of patient's current state:   Sakshi White presents with anxiety and reported ambivalence about working toward goals. Low confidence at this time. Encouraged to put 1 foot in front of the other to work incrementally toward goals.    Type of psychotherapeutic technique provided:   Client centered and CBT    Progress toward short term goals:Progress as expected, she attended follow up session    Review of long term goals: Lose weight, begin exercise program, maintain employment, earn money, manage depression and anxiety, develop support network as possible. Use assertive communication and read about Thinking Traps    Diagnosis:   1. Moderate episode of recurrent major depressive disorder    No Change.     Plan and Follow-up:   Patient will follow safety plan of seeking help from friend/family, calling [facility] [employer], calling 911, and/or presenting to the ED if necessary.  Patient will be compliant with medications and attend appointments as scheduled.  Pt will read [employer] info and She will read assertive communication information  Pt will call [facility] to inquire about membership.  Maintain employment    Discharge Criteria/Planning: Patient will continue with follow-up until therapy can be discontinued without return of signs and symptoms.    Signatures:   Performed and documented by Caleb Otero, V[place], LICSW, LADC          This note was created with help of Dragon dictation software. Grammatical / typing errors are not intentional and inherent to the software.

## 2021-06-13 NOTE — PROGRESS NOTES
Mental Health Visit Note    9/29/2017    Start time: 2:00 PM    Stop Time: 2:50 PM   Session # 4    Sakshi White is a 22 y.o. female is being seen today for a follow-up psychotherapy appointment    Chief Complaint   Patient presents with      Follow Up     Anxiety     Depression   .     New symptoms or complaints:  some anxiety and depression over father sharing that she had emotional problems with her sister. Pt thought that was violation of confidentiality.  and no social support aside from unemployed boyfriend.     Functional Impairment:   The patient identifies the how daily stressors affect daily functioning in today's session as follows (Scale is 1-4, 1=not at all or rarely; 4=extremely so/everyday.)    Personal: 3   Family: 4  Social: 3  Work: 2    Clinical assessment of mental status:   Sakshi White presented on time.   She was oriented x3, open and cooperative, and dressed appropriately for this session and weather. Her memory was Normal cognitive functioning .  Her speech was  Within normal.  Language was linear and concrete.  Concentration and focus is Within normal. Psychosis is not noted or reported. She reports her mood is Congruent w/content of speech and Tearful.  Affect is congruent with speech and is Congruent w/content of speech and Tearful.  Fund of knowledge is adequate. Insight is adequate for therapy.     Suicidal/Homicidal Ideation present:   No,  Patient denies suicidal and homicidal ideations/means or plans.        Patient's impression of their current status:  Pt was less anxious and has secured a new job at [employer]. She reports boyfirend has found work too. They remain together and are paying bills. Some sadness and loneliness over lack of clear communication with family members. Will discuss during next session. Thought fa broke her confidence though she never told him not to share her info with other family members. Reviewed assertive communication. Likes new job. Will take  pharm cert test for 3rd time in future.    Therapist impression of patient's current state:   Sakshi White presents with anxiety and more motivated about working toward goals. Will practice pos visualization to prepare for  Pharm cert exam. Practiced pos vis med in session    Type of psychotherapeutic technique provided:   Client centered and CBT    Progress toward short term goals:Progress as expected, she attended follow up session    Review of long term goals: Lose weight, begin exercise program, maintain employment, earn money, manage depression and anxiety, develop support network as possible. Use assertive communication and read about Thinking Traps    Diagnosis:   1. Moderate episode of recurrent major depressive disorder    No Change.     Plan and Follow-up:   Patient will follow safety plan of seeking help from friend/family, calling [facility] [employer], calling 911, and/or presenting to the ED if necessary.  Patient will be compliant with medications and attend appointments as scheduled.  Pt will practice pos vis med  Pt will call [facility] to inquire about membership.  Maintain employment    Discharge Criteria/Planning: Patient will continue with follow-up until therapy can be discontinued without return of signs and symptoms.    Signatures:   Performed and documented by VIRGINIA Terry, LICSW, LADC          This note was created with help of Dragon dictation software. Grammatical / typing errors are not intentional and inherent to the software.

## 2021-06-13 NOTE — PROGRESS NOTES
Mental Health Visit Note    9/29/2017    Start time: 1:00 PM    Stop Time: 1:50 PM   Session # 3    Sakshi White is a 22 y.o. female is being seen today for a follow-up psychotherapy appointment    Chief Complaint   Patient presents with      Follow Up   .     New symptoms or complaints:  Significant anxiety and depression over financial difficulties and no social support aside from unemployed boyfriend.     Functional Impairment:   The patient identifies the how daily stressors affect daily functioning in today's session as follows (Scale is 1-4, 1=not at all or rarely; 4=extremely so/everyday.)    Personal: 3   Family: 4  Social: 3  Work: 2    Clinical assessment of mental status:   Sakshi White presented on time.   She was oriented x3, open and cooperative, and dressed appropriately for this session and weather. Her memory was Normal cognitive functioning .  Her speech was  Within normal.  Language was linear and concrete.  Concentration and focus is Within normal. Psychosis is not noted or reported. She reports her mood is Congruent w/content of speech and Tearful.  Affect is congruent with speech and is Congruent w/content of speech and Tearful.  Fund of knowledge is adequate. Insight is adequate for therapy.     Suicidal/Homicidal Ideation present:   No,  Patient denies suicidal and homicidal ideations/means or plans.        Patient's impression of their current status:  Pt was anxious and tearful but denied any suicidal ideation or intent. She is angry at boyfriend whose unemployed past 9 months and she can't afford to pay rent in apt and she walks on eggshells around him because she's angry but fearful of expressing feelings. Coached on assertive communication, discussed goals and expectations for relationship and ways of being more true to self vs complying to get along with boyfriend. She may move in with mother next month.     Therapist impression of patient's current state:   Sakshi White  presents with anxiety and more motivated about working toward goals. Fearful of what assertive communication of her needs and expectations with do to relationship. ID'd wants and needs in relationship including partner contributing more and listening to her concerns. Passive aggressive communication currently. Encouraged to put 1 foot in front of the other to work incrementally toward goals.    Type of psychotherapeutic technique provided:   Client centered and CBT    Progress toward short term goals:Progress as expected, she attended follow up session    Review of long term goals: Lose weight, begin exercise program, maintain employment, earn money, manage depression and anxiety, develop support network as possible. Use assertive communication and read about [employer]    Diagnosis:   1. Moderate episode of recurrent major depressive disorder    No Change.     Plan and Follow-up:   Patient will follow safety plan of seeking help from friend/family, calling [facility] [employer] [facility], calling 911, and/or presenting to the ED if necessary.  Patient will be compliant with medications and attend appointments as scheduled.  Pt will read [employer] info and She will read assertive communication information  Pt will call [facility] to inquire about membership.  Maintain employment    Discharge Criteria/Planning: Patient will continue with follow-up until therapy can be discontinued without return of signs and symptoms.    Signatures:   Performed and documented by VIRGINIA Terry, LICSW, LADC          This note was created with help of Dragon dictation software. Grammatical / typing errors are not intentional and inherent to the software.

## 2021-06-14 ENCOUNTER — OFFICE VISIT - HEALTHEAST (OUTPATIENT)
Dept: FAMILY MEDICINE | Facility: CLINIC | Age: 26
End: 2021-06-14

## 2021-06-14 ENCOUNTER — COMMUNICATION - HEALTHEAST (OUTPATIENT)
Dept: SCHEDULING | Facility: CLINIC | Age: 26
End: 2021-06-14

## 2021-06-14 DIAGNOSIS — F32.2 SEVERE MAJOR DEPRESSION, SINGLE EPISODE (H): ICD-10-CM

## 2021-06-14 DIAGNOSIS — L08.9 FINGER INFECTION: ICD-10-CM

## 2021-06-14 ASSESSMENT — PATIENT HEALTH QUESTIONNAIRE - PHQ9: SUM OF ALL RESPONSES TO PHQ QUESTIONS 1-9: 4

## 2021-06-14 NOTE — PROGRESS NOTES
"  Subjective:     Sakshi White is a 25 y.o. female who presents for an annual exam.     Other concerns today:  1.  Vaginal odor.  She has had vaginal odor and discharge for some time.  She usually does not get her period at all, in part due to PCOS.  However she had spotting for 2 days recently.  She has also had pain with intercourse.  She later says this is a chronic problem, but may be a little worse recently.    2.  Depression.  History of major depression.  She feels like recently her mood has been worse.  The past year has been especially difficult, due to pandemic and multiple other factors.  Her uncle was also killed last year.  She says other things have been \"stressful.\"  She lives with her boyfriend, good relationship.  She works at a homeless shelter, work is going okay.  She is not sleeping well.  Usually uses Benadryl for sleep.    In the past she has seen OB/GYN for PCOS treatment.  She also saw a counselor named Caleb at [facility] in the past.  She is wondering if she could see him again.    Immunization History   Administered Date(s) Administered     Dtap 1993, 1993, 04/10/1994, 07/25/1997, 07/29/1998     HPV Quadrivalent 12/04/2008, 11/19/2010, 02/16/2012     Hep A, historic 12/04/2008     Hep B, Peds or Adolescent 07/25/1997     Hep B, Peds, Historic 07/25/1997     Hep B, historic 04/12/1996, 07/15/1996     Hepatitis A, Ped/Adol 2 Dose IM (18yr & under) 12/04/2008     Hib (PRP-OMP) 1993, 1993, 02/10/1994     IPV 1993, 1993, 07/25/1997, 07/29/1998     Influenza, Live, Nasal LAIV3 12/04/2008     Influenza, Seasonal, Inj PF IIV3 10/28/2014     Influenza, inj, historic,unspecified 12/04/2008, 11/19/2010, 11/28/2014     Influenza,seasonal, Inj IIV3 11/19/2010, 11/28/2014     MMR 09/12/1994, 07/25/1997     Meningococcal MCV4P 12/04/2008     Meningococcal,Historic,Unknown serogroups 12/04/2008     OPV,Trivalent,Historic(3278-0678 only) 07/25/1997     Td, adult " adsorbed, PF 2020     Tdap 2005, 2008     Varicella 1997, 2008       Gynecologic History  Patient's last menstrual period was 2021 (within days).  Contraception: none  Last Pap:   Last mammogram: n/a    OB History    Para Term  AB Living   0 0 0 0 0 0   SAB TAB Ectopic Multiple Live Births   0 0 0 0 0         Current Outpatient Medications:      cholecalciferol, vitamin D3, 1,000 unit tablet, Take 2 tablets (2,000 Units total) by mouth daily., Disp: 180 tablet, Rfl: 3     ibuprofen (ADVIL,MOTRIN) 800 MG tablet, Take 1 tablet (800 mg total) by mouth every 6 (six) hours as needed for pain., Disp: 30 tablet, Rfl: 1     medroxyPROGESTERone (PROVERA) 10 MG tablet, Take 10 mg by mouth daily., Disp: , Rfl:      metroNIDAZOLE (FLAGYL) 500 MG tablet, Take 1 tablet (500 mg total) by mouth 2 (two) times a day for 7 days., Disp: 14 tablet, Rfl: 0     omeprazole (PRILOSEC) 20 MG capsule, Take 1 capsule (20 mg total) by mouth daily as needed., Disp: 90 capsule, Rfl: 3     ondansetron (ZOFRAN-ODT) 4 MG disintegrating tablet, DIS 1 T ON THE TONGUE Q 8 H FOR UP TO 14 DAYS PRF NAUSEA, Disp: , Rfl:      SF 5000 PLUS 1.1 % Crea, Sodium fluoride toothpaste, Disp: , Rfl:      white petrolatum-mineral oiL (LUBRIFRESH PM) 83-15 % Oint, 0.25 inches., Disp: , Rfl:   Past Medical History:   Diagnosis Date     Depression     talk therapy     PCOS (polycystic ovarian syndrome)      Secondary amenorrhea      Vaginitis      Varicella     as child     Past Surgical History:   Procedure Laterality Date     PELVIC LAPAROSCOPY  2015     Patient has no known allergies.  Family History   Problem Relation Age of Onset     Hypertension Mother      Kidney disease Mother      Depression Mother      Diabetes Father      Hypertension Father      Cancer Maternal Grandmother         unsure type, organ failure, used to be drug addict     Diabetes Paternal Grandmother      Vision loss Paternal  Grandmother      Hypertension Paternal Grandmother      No Medical Problems Sister      No Medical Problems Brother      No Medical Problems Brother      No Medical Problems Sister      No Medical Problems Sister      No Medical Problems Sister      Social History     Socioeconomic History     Marital status: Single     Spouse name: Hugo     Number of children: 0     Years of education: Not on file     Highest education level: Not on file   Occupational History     Employer: [employer]   Social Needs     Financial resource strain: Not on file     Food insecurity     Worry: Not on file     Inability: Not on file     Transportation needs     Medical: Not on file     Non-medical: Not on file   Tobacco Use     Smoking status: Never Smoker     Smokeless tobacco: Never Used   Substance and Sexual Activity     Alcohol use: No     Comment: 1-2x/year     Drug use: No     Sexual activity: Yes     Partners: Male     Birth control/protection: None     Comment: 2+ year monog   Lifestyle     Physical activity     Days per week: Not on file     Minutes per session: Not on file     Stress: Not on file   Relationships     Social connections     Talks on phone: Not on file     Gets together: Not on file     Attends Pentec[employer]al service: Not on file     Active member of club or organization: Not on file     Attends meetings of clubs or organizations: Not on file     Relationship status: Not on file     Intimate partner violence     Fear of current or ex partner: Not on file     Emotionally abused: Not on file     Physically abused: Not on file     Forced sexual activity: Not on file   Other Topics Concern     Not on file   Social History Narrative    Patient lives with her boyfriend.  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       Review of Systems  Complete Review of Systems is discussed with patient and is negative except as noted in HPI.    Objective:     Vitals:     01/25/21 1250   BP: 117/77   Pulse: 88   Temp: 98.2  F (36.8  C)     Body mass index is 34.13 kg/m .    Physical Exam:  General: Patient is alert and Oriented x 3, in no apparent distress.  HEENT, Thyroid, Lymphatic, Cardiac, Pulmonary, GI, Musculoskeletal, and Neuro exams were completed today and grossly normal.  Breast Exam: No lumps, skin changes, lymphadenopathy, or nipple discharge noted bilaterally.  Genitourinary Exam: External genitalia is normal in appearance, vaginal walls are healthy and without lesions, no significant discharge noted in the vaginal vault, cervix is somewhat well visualized and appears normal.      Results for orders placed or performed in visit on 01/25/21   Wet Prep, Vaginal    Specimen: Genital   Result Value Ref Range    Yeast Result No yeast seen No yeast seen    Trichomonas No Trichomonas seen No Trichomonas seen    Clue Cells, Wet Prep No Clue cells seen No Clue cells seen   other labs pending    Assessment and Plan:     1. Annual physical exam  Health Maintenance discussed with patient as appropriate for age and risk factors.    2. Vaginal discharge  Will treat for presumptive BV, will follow-up with other results.  If symptoms have not improved by the time she sees OBGYN, she can follow-up with them regarding symptoms.  - Wet Prep, Vaginal  - Chlamydia trachomatis & Neisseria gonorrhoeae, Amplified Detection  - metroNIDAZOLE (FLAGYL) 500 MG tablet; Take 1 tablet (500 mg total) by mouth 2 (two) times a day for 7 days.  Dispense: 14 tablet; Refill: 0    3. Dyspareunia in female  4. PCOS (polycystic ovarian syndrome)  - Ambulatory referral to Obstetrics / Gynecology    5. Severe major depression, single episode (H)  Would like to re-start counseling.  Declines medication at this time.  Continue to use Benadryl for sleep.  Follow-up if worsening.    Patient denies any thoughts of wanting to hurt him/herself or others and does contract for safety.  - AMB REFERRAL TO MENTAL HEALTH AND  ADDICTION  - Adult (18+); Outpatient Treatment; Individual/Couples/Family/Group Therapy/Health Psychology; [facility]; [facility]; We will contact you to schedule the appointment or pleas...    The following high BMI interventions were performed this visit: encouragement to exercise    This dictation uses voice recognition software, which may contain typographical errors.

## 2021-06-15 NOTE — PROGRESS NOTES
[facility] Note    Sakshi White   22 y.o. female    Date of Visit: 2/6/2018  Chief Complaint   Patient presents with     Shoulder Pain     X two weeks of left shoulder pain, feels like stabbing pain through the elbow  but starting from yesterday felt better, want want to check it         ASSESSMENT/PLAN  1. PCOS (polycystic ovarian syndrome)  Ambulatory referral to Midwifery    Glycosylated Hemoglobin A1c   2. Hirsutism  Ambulatory referral to Midwifery   3. Acute pain of left shoulder     4. GERD (gastroesophageal reflux disease)     5. Vitamin D deficiency  Vitamin D, Total (25-Hydroxy)     ---------------------------------------------    1.  Interested in further workup and treatment for polycystic ovarian syndrome, I would defer to midwifery versus OB/GYN for further assessment and treatment.  I had mentioned that spironolactone is sometimes used for hirsutism, but would be important to ensure she does not become pregnant while on this medication, due to the teratogenicity    2.  See above    3.  This resolved without further treatment, unclear what the cause was, neuritis/radiculitis suspect    4.  GERD symptoms seem quite severe, commended avoiding triggers, avoiding overeating, omeprazole once a day before eating, if not improving or completely controlled in 3 weeks, would recommend H. pylori testing versus gastroenterology referral.  I confirmed with her that she is not using ibuprofen on a regular basis or other NSAIDs/aspirin    5.  She has history of vitamin D deficiency, last level was 11.0, she would like this rechecked.  She is on cholecalciferol 2000 units once a day.    Return for Next scheduled follow up.      SUBJECTIVE  Sakshi White is a 22-year-old woman who presents for a number of symptoms and problems.    The first is that she was having pain in the left shoulder near the coracoid process which seem to radiate up towards the neck, then down to the elbow.  It felt like a nerve  pain.  The last for 2 weeks, she could not come in because her insurance was not sorted out, but seemed to resolve today.  Tylenol and ibuprofen did not help.  There was no effect with positioning of her neck or arm.  It did not help or hurt it if she massage the area.  There is no injury noted.    She has history of polycystic ovarian syndrome, has been amenorrheic for at least the last 1 year, wonders what she can do for further treatment of this.  She is concerned about not having a period, also concerned about the chin and hair she gets from hirsutism.  She has never tried spironolactone.  She would prefer not to be on oral contraceptives.  She is interested in seeing the nurse midwife to discuss this further.    Unfortunately, her heartburn has been worse lately.  She describes this as pain in the epigastric area, ascending up the back of the throat, burning sensation, acidic taste in the back of the throat, she vomited twice one time this summer because of the pain.  She takes Tums at times, but ended up stopping that because it no longer helped.  She has been doing ranitidine 300 mg a day, this is not completely controlling her symptoms.  She has not been doing pantoprazole or omeprazole or other similar proton pump inhibitors.      Medications, allergies, and problem list were reviewed and updated    Patient Active Problem List   Diagnosis     PCOS (polycystic ovarian syndrome)     Hirsutism     Vitamin D deficiency     Past Medical History:   Diagnosis Date     Depression      PCOS (polycystic ovarian syndrome)      Secondary amenorrhea      Vaginitis      Current Outpatient Prescriptions   Medication Sig Dispense Refill     cholecalciferol, vitamin D3, 1,000 unit tablet Take 2,000 Units by mouth daily.       ibuprofen (ADVIL,MOTRIN) 800 MG tablet TAKE 1 TABLET(800 MG) BY MOUTH EVERY 8 HOURS AS NEEDED FOR PAIN 60 tablet 0     omeprazole (PRILOSEC) 20 MG capsule Take 1 capsule (20 mg total) by mouth daily.  30 capsule 11     No current facility-administered medications for this visit.      No Known Allergies    EXAM  Vitals:    02/06/18 1641   BP: 102/62   Patient Site: Left Arm   Patient Position: Sitting   Cuff Size: Adult Regular   Pulse: 92   SpO2: 99%   Weight: 191 lb (86.6 kg)         General: Alert, no distress  Psychiatric: Pleasant affect  Musculoskeletal: No pain with palpation of the left shoulder, negative painful arc, negative impingement signs including negative Neer test, no pain with Spurling maneuver    This visit lasted a total of 25 minutes.  Over 50% of the time was spent counseling regarding he management of vitamin D level, screen for diabetes, PCOS, hirsutism, shoulder pain.       Donis Quiroga DO  Internal Medicine  [facility]

## 2021-06-16 PROBLEM — G43.009 MIGRAINE WITHOUT AURA AND WITHOUT STATUS MIGRAINOSUS, NOT INTRACTABLE: Status: ACTIVE | Noted: 2018-06-25

## 2021-06-16 PROBLEM — E55.9 VITAMIN D DEFICIENCY: Status: ACTIVE | Noted: 2018-02-06

## 2021-06-16 PROBLEM — E66.9 OBESITY (BMI 30-39.9): Status: ACTIVE | Noted: 2018-02-24

## 2021-06-16 PROBLEM — N97.0 PRIMARY ANOVULATORY INFERTILITY: Status: ACTIVE | Noted: 2018-03-01

## 2021-06-16 PROBLEM — K21.9 GASTROESOPHAGEAL REFLUX DISEASE WITHOUT ESOPHAGITIS: Status: ACTIVE | Noted: 2018-08-29

## 2021-06-16 NOTE — PROGRESS NOTES
A: PCOS  Obesity  Desiring pregnancy    P: Pelvic US  Recommended losing 5-10% of current weight through diet and exercise as this can increase fertility on its own  Will review [facility] OB chart and consult with OB on next plan of care  Recommend daily multivitamin or prenatal vitamin    S: Sakshi was referred to the CNM group to assess for assistance in getting pregnant.  She has a history of PCOS diagnosed in her teen years due to oligomenorrhea.  She eventually did have an US which was consistent with PCOS.  After prometrium trial, she had normal FSH, LH, TSH, prolactin, and Hgb A1C in 5/2017.  She also had normal Hgb A1C earlier this month.  She was evaluated by Dr. Rodriguez at [facility] for fertility options in light of PCOS.  She underwent HSG which showed blocked tubes in 8/2015.  She had a laparoscopic  tubal dye study done 8/19/15 that showed both tubes patent, no adhesions or endometriosis. She underwent at least 2 cycles with clomid beginning with 50mg and increasing to 100mg without success.  She then used Letrozole beginning at 2.5mg and increasing to 5mg and 7.5mg over at least 2 cycles without success.  She attempted Ovidrel injection for one cycle which did not result in pregnancy the end of March 2016.  In March 2016 she was placed on Microgestin for an unknown amount of time as well.  This was switched to minivelle patch in April of 2016.  She then decided to give herself a break from attempting conception due to mental and emotional fatigue and wanting to be off hormones for a while.  She attempted another 2 rounds of Letrozole 7.5mg and 10mg in 11/2016 and 12/2016 respectively which did not result in pregnancy.  Throughout this process, she had many US that showed findings consistent with PCOS and no dominant follicles.  She has not had a menstrual bleed since then.  She notes hirsutism sxms on her face and chest which she shaves.  She continues to have depression symptoms and sees a therapist every  1-6 mo depending on her emotional need.  Her boyfriend is supportive and desires a child as well.  He has no other children.  Per the transferred records, he had a normal semen analysis.      Diet: Stopped drinking soda and has reduced chips and candy recently.  Often does not eat breakfast as she gets up late.  Usually she cooks for herself and partner, reporting she does not have a lot of protein in her diet.    Exercise: stretching and leg work (squats) daily, occasionally uses punching bag for boxing which she enjoys and plans to do more.    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    Medications: vitamin D3 1,000IU daily, advil and prilosec PRN    Review of Systems - Negative except as noted  History obtained from chart review and the patient  General ROS: positive for  - obesity  Psychological ROS: positive for - depression  Endocrine ROS: positive for - hirsutism, amenorrhea      Past Medical History:   Diagnosis Date     Depression     talk therapy     PCOS (polycystic ovarian syndrome)      Secondary amenorrhea      Vaginitis      Varicella     as child     Past Surgical History:   Procedure Laterality Date     PELVIC LAPAROSCOPY  08/19/2015     Social History     Social History     Marital status: Single     Spouse name: N/A     Number of children: N/A     Years of education: N/A     Occupational History      [employer]     Social History Main Topics     Smoking status: Never Smoker     Smokeless tobacco: Never Used     Alcohol use No      Comment: 1-2x/year     Drug use: No     Sexual activity: Yes     Partners: Male     Birth control/ protection: None      Comment: 2+ year monog     Other Topics Concern     Not on file     Social History Narrative    Patient lives with her boyfriend.  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     Family History   Problem Relation Age of Onset     Hypertension Mother      Kidney disease Mother       Diabetes Father      Hypertension Father      Cancer Maternal Grandmother      unsure type, organ failure, used to be drug addict     Diabetes Paternal Grandmother      Vision loss Paternal Grandmother      No Medical Problems Sister      No Medical Problems Brother      No Medical Problems Brother      No Medical Problems Sister      No Medical Problems Sister      No Medical Problems Sister        Last pap: 8/2015 NIL  Menarche: 11    O: Physical Examination: General appearance - alert, well appearing, and in no distress and overweight  Mental status - alert, oriented to person, place, and time  Eyes - pupils equal and reactive, extraocular eye movements intact  Neck - supple, no significant adenopathy, thyroid exam: thyroid is normal in size without nodules or tenderness  Chest - clear to auscultation, no wheezes, rales or rhonchi, symmetric air entry  Heart - normal rate, regular rhythm, normal S1, S2, no murmurs, rubs, clicks or gallops  Abdomen - soft, nontender, nondistended, no masses or organomegaly  Skin - normal coloration and turgor, no rashes, no suspicious skin lesions noted.  Facial hair under chin and jaw noted and shaved.       Total time 40 min, more than 50% of the time was spent on counseling and education

## 2021-06-16 NOTE — PATIENT INSTRUCTIONS - HE
Dear Sakshi White,    Your symptoms show that you may have coronavirus (COVID-19). This illness can cause fever, cough and trouble breathing. Many people get a mild case and get better on their own. Some people can get very sick.    Will I be tested for COVID-19?  We would like to test you for Covid-19 virus. I have placed orders for this test.     To schedule: go to your [facility] home page and scroll down to the section that says  You have an appointment that needs to be scheduled  and click the large green button that says  Schedule Now  and follow the steps to find the next available openings.    If you are unable to complete these [facility] scheduling steps, please call [phone] to schedule your testing.     Return to work/school/ guidance:  Please let your workplace manager and staffing office know when your quarantine ends     We can t give you an exact date as it depends on the above. You can calculate this on your own or work with your manager/staffing office to calculate this. (For example if you were exposed on 10/4, you would have to quarantine for 14 full days. That would be through 10/18. You could return on 10/19.)      If you receive a positive COVID-19 test result, follow the guidance of the those who are giving you the results. Usually the return to work is 10 (or in some cases 20 days from symptom onset.) If you work at [facility], you must also be cleared by Employee Occupational Health and Safety to return to work.        If you receive a negative COVID-19 test result and did not have a high risk exposure to someone with a known positive COVID-19 test, you can return to work once you're free of fever for 24 hours without fever-reducing medication and your symptoms are improving or resolved.      If you receive a negative COVID-19 test and If you had a high risk exposure to someone who has tested positive for COVID-19 then you can return to work 14 days after your last contact  with the positive individual    Note: If you have ongoing exposure to the covid positive person, this quarantine period may be more than 14 days. (For example, if you are continued to be exposed to your child who tested positive and cannot isolate from them, then the quarantine of 7-14 days can't start until your child is no longer contagious. This is typically 10 days from onset of the child's symptoms. So the total duration may be 17-24 days in this case.)    Sign up for [employer].   We know it's scary to hear that you might have COVID-19. We want to track your symptoms to make sure you're okay over the next 2 weeks. Please look for an email from [employer]--this is a free, online program that we'll use to keep in touch. To sign up, follow the link in the email you will receive. Learn more at [email or web address]    How can I take care of myself?    Get lots of rest. Drink extra fluids (unless a doctor has told you not to)    Take Tylenol (acetaminophen) or ibuprofen for fever or pain. If you have liver or kidney problems, ask your family doctor if it's okay to take Tylenol o ibuprofen    If you have other health problems (like cancer, heart failure, an organ transplant or severe kidney disease): Call your specialty clinic if you don't feel better in the next 2 days.    Know when to call 911. Emergency warning signs include:  o Trouble breathing or shortness of breath  o Pain or pressure in the chest that doesn't go away  o Feeling confused like you haven't felt before, or not being able to wake up  o Bluish-colored lips or face    Where can I get more information?  [facility] [employer] [facility] - About COVID-19:   [email or web address]    CDC - What to Do If You're Sick:   www.cdc.gov/coronavirus/2019-ncov/about/steps-when-sick.html

## 2021-06-18 NOTE — PATIENT INSTRUCTIONS - HE
Patient Instructions by Mauro Chavez CNP at 3/24/2020 12:30 PM     Author: Mauro Chavez CNP Service: -- Author Type: Nurse Practitioner    Filed: 3/24/2020  1:17 PM Encounter Date: 3/24/2020 Status: Addendum    : Mauro Chavez CNP (Nurse Practitioner)    Related Notes: Original Note by Mauro Chavez CNP (Nurse Practitioner) filed at 3/24/2020  1:17 PM       Ciprofloxan every 2 hours while awake for the first 2 days, then every 4 hours while awake for the next 5 days.      Patient Education     Conjunctivitis, Nonspecific    The membrane that covers the white part of your eye (the conjunctiva) is inflamed. Inflammation happens when your body responds to an injury, allergic reaction, infection, or illness. Symptoms of inflammation in the eye may include redness, irritation, itching, swelling, or burning. These symptoms should go away within the next 24 hours. Conjunctivitis may be related to a particle that was in your eye. If so, it may wash out with your tears or irrigation treatment. Being exposed to liquid chemicals or fumes may also cause this reaction.   Home care    Apply a cold pack over the eye for 20 minutes at a time. This will reduce pain. To make a cold pack, put ice cubes in a plastic bag that seals at the top. Wrap the bag in a clean, thin towel or cloth.    Artificial tears may be prescribed to reduce irritation or redness.  These should be used 3 to 4 times a day.    You may use acetaminophen or ibuprofen to control pain, unless another medicine was prescribed. (Note: If you have chronic liver or kidney disease, or if you have ever had a stomach ulcer or gastrointestinal bleeding, talk with your healthcare provider before using these medicines.)  Follow-up care  Follow up with your healthcare provider, or as advised.  When to seek medical advice  Call your healthcare provider right away if any of these occur:    Increased eyelid swelling    Increased eye  pain    Increased redness or drainage from the eye    Increased blurry vision or increased sensitivity to light    Failure of normal vision to return within 24 to 48 hours  Date Last Reviewed: 7/1/2017 2000-2017 [employer]. [address] [place] [address], [place] [address]. All rights reserved. This information is not intended as a substitute for professional medical care. Always follow your healthcare professional's instructions.

## 2021-06-18 NOTE — PROGRESS NOTES
[facility] Clinic Note    Sakshi White   23 y.o. female    Date of Visit: 6/25/2018  Chief Complaint   Patient presents with     Heartburn       ASSESSMENT/PLAN  1. Gastroesophageal reflux disease without esophagitis  H. pylori Antigen, Stool(HPSAG)   2. Migraine without aura and without status migrainosus, not intractable       ---------------------------------------------    1.  Severe GERD, likely related to ibuprofen use.  She did not want to stay on omeprazole, as it did not work as well (she was taking it episodically at the time, sometimes still does this).  I recommended avoidance of ibuprofen, will try diclofenac 50 mg tablets in its place.  Also recommended checking stool H pylori testing.  If this is negative, she is able to have NSAIDs and pain persists, next step would be GI referral.    2.  I talked to her a little bit about headaches, she has some migraine features such as phonophobia, pulsatile headache on the one side of the head, some nausea associated with it.  It has been bothering her for the last couple weeks.  Unclear if it is classic migraine, but consider her for lactic medication if these continue frequently.  I would not start it now since they have not been present prior to 2 weeks ago.    No Follow-up on file.      SUBJECTIVE  Sakshi White is a 23-year-old woman who presents for follow-up from visit in February.  We talked her about GERD at that time, and I recommended taking omeprazole daily.  This effectively took care of it, but after that month of March she stopped it and it came back.  She takes 150-300 mg Zantac when needed, and is needing it about every day.  She is taking ibuprofen 800 mg doses alternating with 1600 mg doses for various pains including headaches she has been having for the last couple of weeks.  She never exceeds 600 mg total.  Tylenol does not seem to work for pain.  She also had some wisdom teeth taken out recently.    There is no association with  eating on the GERD.  Sometimes it wakes her up from sleep.    She has not tried other NSAIDs such as naproxen.    She is going to [place] with some friends in a short period of time from now and has been having pain on the dorsal aspect of her right foot, especially when she first takes her initial steps in the morning.  It does not bother her throughout the day for the most part.  She has been using sandals as her primary mode of foot where almost exclusively.    She is here with her significant other [place], who I saw before her.    Medications, allergies, and problem list were reviewed and updated    Patient Active Problem List   Diagnosis     PCOS (polycystic ovarian syndrome)     Hirsutism     Vitamin D deficiency     Obesity (BMI 30-39.9)     Primary anovulatory infertility     Migraine without aura and without status migrainosus, not intractable     Past Medical History:   Diagnosis Date     Depression     talk therapy     PCOS (polycystic ovarian syndrome)      Secondary amenorrhea      Vaginitis      Varicella     as child     Current Outpatient Prescriptions   Medication Sig Dispense Refill     ranitidine (ZANTAC) 150 MG tablet Take 150-300 mg by mouth 2 (two) times a day.       cholecalciferol, vitamin D3, 1,000 unit tablet Take 2,000 Units by mouth daily.       diclofenac (VOLTAREN) 50 MG EC tablet Take 1 tablet (50 mg total) by mouth 2 (two) times a day as needed. 20 tablet 1     No current facility-administered medications for this visit.      No Known Allergies    EXAM  Vitals:    06/25/18 1621   BP: 118/66   Patient Site: Left Arm   Patient Position: Sitting   Cuff Size: Adult Regular   Pulse: 74         General: Alert, pleasant, no distress  Musculoskeletal: No tenderness with palpation of the metatarsals or tarsal bones of the right foot.  No bruising or swelling.  Abdomen: Nondistended    This visit lasted a total of 25 minutes.  Over 50% of the time was spent counseling regarding he  management of foot pain, GERD.       Donis Quiroga,   Internal Medicine  [facility]

## 2021-06-18 NOTE — PATIENT INSTRUCTIONS - HE
Patient Instructions by Austin Martínez MD at 4/30/2020  1:21 PM     Author: Austin Martínez MD Service: -- Author Type: Physician    Filed: 4/30/2020  1:21 PM Encounter Date: 4/30/2020 Status: Signed    : Austin Martínez MD (Physician)         The symptoms you describe suggest a viral cause, which is much more common than a bacterial cause. Antibiotics will treat bacterial infections, but have no effect on viral infections. If possible, especially if improving, start with symptom care for the first 7-10 days, then consider seeking further treatment or taking an antibiotic. Bacterial infections generally are more severe, including symptoms such as pus, fever over 101degrees F, or rapidly worsening.    Sinusitis (No Antibiotics)    The sinuses are air-filled spaces within the bones of the face. They connect to the inside of the nose. Sinusitis is an inflammation of the tissue that lines the sinuses. Sinusitis can occur during a cold. It can also happen due to allergies to pollens and other particles in the air. It can cause symptoms such as sinus congestion, headache, sore throat, facial swelling, and a feeling of fullness. It may also cause a low-grade fever. Your sinusitis does not include an infection with bacteria. Because of this, antibiotics are not used to treat this problem.  Home care    Drink plenty of water, hot tea, and other liquids. This may help thin nasal mucus. It also may help your sinuses drain fluids.    Heat may help soothe painful areas of your face. Use a towel soaked in hot water. Or,  the shower and direct the warm spray onto your face. Using a vaporizer along with a menthol rub at night may also help soothe symptoms.     An expectorant with guaifenesin may help thin nasal mucus and help your sinuses drain fluids.    You can use an over-the-counter decongestant, unless a similar medicine was prescribed to you. Nasal sprays work the fastest. Use one that contains  phenylephrine or oxymetazoline. First blow your nose gently. Then use the spray. Do not use these medicines more often than directed on the label. If you do, your symptoms may get worse. You may also take pills that contain pseudoephedrine. Dont use products that combine multiple medicines. This is because side effects may be increased. Read all medicine labels. You can also ask the pharmacist for help. (People with high blood pressure should not use decongestants. They can raise blood pressure.)    Over-the-counter antihistamines may help if allergies contributed to your sinusitis.      Use acetaminophen or ibuprofen to control pain, unless another pain medicine was prescribed to you. If you have chronic liver or kidney disease or ever had a stomach ulcer, talk with your healthcare provider before using these medicines. (Aspirin should never be taken by anyone under age 18 who is ill with a fever. It may cause severe liver damage.)    Use nasal rinses or irrigation as instructed by your healthcare provider.    Don't smoke. This can make symptoms worse.  Follow-up care  Follow up with your healthcare provider or our staff if you are better in 1 week.  When to seek medical advice  Call your healthcare provider if any of these occur:    Green or yellow fluid draining from your nose or into your throat    Facial pain or headache that gets worse    Stiff neck    Unusual drowsiness or confusion    Swelling of your forehead or eyelids    Vision problems, such as blurred or double vision    Fever of 100.4 F (38 C) or higher, or as directed by your healthcare provider    Seizure    Breathing problems    Symptoms that don't go away in 10 days  Date Last Reviewed: 11/1/2017 2000-2017 [employer]. [address] [place] [address], [place] [address]. All rights reserved. This information is not intended as a substitute for professional medical care. Always follow your healthcare professional's  instructions.          Allergic Rhinitis  Allergic rhinitis is an allergic reaction that affects the nose, and often the eyes. Its often known as nasal allergies. Nasal allergies are often due to things in the environment that are breathed in. Depending what you are sensitive to, nasal allergies may occur only during certain seasons. Or they may occur year round. Common indoor allergens include house dust mites, mold, cockroaches, and pet dander. Outdoor allergens include pollen from trees, grasses, and weeds.   Symptoms include a drippy, stuffy, and itchy nose. They also include sneezing and red and itchy eyes. You may feel tired more often. Severe allergies may also affect your breathing and trigger a condition called asthma.   Tests can be done to see what allergens are affecting you. You may be referred to an allergy specialist for testing and further evaluation.  Home care  Your healthcare provider may prescribe medicines to help relieve allergy symptoms. These may include oral medicines, nasal sprays, or eye drops.  Ask your provider for advice on how to avoid substances that you are allergic to. Below are a few tips for each type of allergen.  Pet dander:    Do not have pets with fur and feathers.    If you can't avoid having a pet, keep it out of your bedroom and off upholstered furniture.  Pollen:    When pollen counts are high, keep windows of your car and home closed. If possible, use an air conditioner instead.    Wear a filter mask when mowing or doing yard work.  House dust mites:    Wash bedding every week in warm water and detergent and dry on a hot setting.    Cover the mattress, box spring, and pillows with allergy covers.     If possible, sleep in a room with no carpet, curtains, or upholstered furniture.  Cockroaches:    Store food in sealed containers.    Remove garbage from the home promptly.    Fix water leaks  Mold:    Keep humidity low by using a dehumidifier or air conditioner. Keep the  dehumidifier and air conditioner clean and free of mold.    Clean moldy areas with bleach and water.  In general:    Vacuum once or twice a week. If possible, use a vacuum with a high-efficiency particulate air (HEPA) filter.    Do not smoke. Avoid cigarette smoke. Cigarette smoke is an irritant that can make symptoms worse.  Follow-up care  Follow up as advised by the healthcare provider or our staff. If you were referred to an allergy specialist, make this appointment promptly.  When to seek medical advice  Call your healthcare provider right away if the following occur:    Coughing or wheezing    Fever of 100.4 F (38 C) or higher, or as directed by your healthcare provider    Raised red bumps (hives)    Continuing symptoms, new symptoms, or worsening symptoms  Call 911 if you have:    Trouble breathing    Severe swelling of the face or severe itching of the eyes or mouth  Date Last Reviewed: 3/1/2017    9872-6254 [employer]. [address] [place] [address]. All rights reserved. This information is not intended as a substitute for professional medical care. Always follow your healthcare professional's instructions.        If you don't see improvement after 3 days of treatment I would recommend you come in for an appointment to discuss these symptoms. You are able to schedule an appointment within [facility] at your convenience.    If you do need to come in for this same symptom within the next seven days, your eVisit will be free of charge.

## 2021-06-20 NOTE — LETTER
Letter by Mauro Chavez CNP at      Author: Mauro Chavez CNP Service: -- Author Type: --    Filed:  Encounter Date: 3/24/2020 Status: (Other)         March 24, 2020     Patient: Sakshi White   YOB: 1995   Date of Visit: 3/24/2020       To Whom It May Concern:  Sakshi White is under my care for an acute eye problem. Please excuse her from missed work on 3/24/2020.     If you have any questions or concerns, please don't hesitate to call.    Sincerely,        Electronically signed by Mauro Chavez CNP

## 2021-06-20 NOTE — PROGRESS NOTES
[facility] Clinic Note    Sakshi White   23 y.o. female    Date of Visit: 8/29/2018  Chief Complaint   Patient presents with     Bite     itching, sensitive to touch, burning       ASSESSMENT/PLAN  1. Insect bite, initial encounter     2. Hyperpigmentation of skin, postinflammatory     3. Migraine without aura and without status migrainosus, not intractable     4. Gastroesophageal reflux disease without esophagitis       ---------------------------------------------    1.  Insect bites consistent with bedbugs.  I recommended she contact an .  Steroid cream ordered for symptomatic management of itching    2.  Hyperpigmented areas seems to bother her, prescribed tretinoin 0.1% cream to use at night for this.    3.  She tried diclofenac, not helping, back to ibuprofen at a high dose.  Nothing by the 800 mg dose seems to help her with the headaches.    4.  She will  an H. pylori stool container because of ongoing symptoms of GERD.  I suspect it might be related to ibuprofen, however.    Return if symptoms worsen or fail to improve, for Next scheduled follow up.      SUBJECTIVE  Sakshi White is a 23-year-old woman who presents for potential bug bites.    Dating back to March, she has intermittently had new bites which seemed to itch, they last for several weeks before resolving, sometimes to a hyperpigmented area, which she shows me on her arms.  She has been looking up information about bedbugs after our last conversation about the potential for bedbugs, saw a couple of them crawling on the floor.  She got rid of her mattress, sleeps on an inflatable mattress now.  Last night, she suspects that she got 3 or 4 new bites, all in a row, on her left shoulder.  She came in for this purpose.    She is also concerned about the hyperpigmented spots on her arms where she used to have the bug bites.    She lives in a townhouse, with an attached unit in the same building.  Her significant other  "Hugo, also patient of mine, has not been affected by any bites.    At a recent visit we talked about headaches, she tried diclofenac but it did not help, she is back to using ibuprofen 800 mg 1-2 times per day as needed.  She still has stomach issues, is on omeprazole.  She did not collect the H. pylori stool test because she lost the sample container.      Medications, allergies, and problem list were reviewed and updated    Patient Active Problem List   Diagnosis     PCOS (polycystic ovarian syndrome)     Hirsutism     Vitamin D deficiency     Obesity (BMI 30-39.9)     Primary anovulatory infertility     Migraine without aura and without status migrainosus, not intractable     Gastroesophageal reflux disease without esophagitis     Past Medical History:   Diagnosis Date     Depression     talk therapy     PCOS (polycystic ovarian syndrome)      Secondary amenorrhea      Vaginitis      Varicella     as child     Current Outpatient Prescriptions   Medication Sig Dispense Refill     cholecalciferol, vitamin D3, 1,000 unit tablet Take 2,000 Units by mouth daily.       ibuprofen (ADVIL,MOTRIN) 800 MG tablet Take 800 mg by mouth every 6 (six) hours as needed for pain.       omeprazole (PRILOSEC) 20 MG capsule Take 20 mg by mouth daily as needed.       prenatal no115-iron-folic acid 29 mg iron- 1 mg Chew Chew 1 tablet daily.       ranitidine (ZANTAC) 150 MG tablet Take 150-300 mg by mouth 2 (two) times a day.       tretinoin (RETIN-A) 0.1 % cream Apply topically daily. 20 g 0     triamcinolone (KENALOG) 0.1 % cream Apply topically 2 (two) times a day as needed. itching 45 g 0     No current facility-administered medications for this visit.      No Known Allergies    EXAM  Vitals:    08/29/18 1113   BP: 124/72   Patient Site: Left Arm   Patient Position: Sitting   Cuff Size: Adult Regular   Pulse: 88   SpO2: 100%   Weight: 193 lb 3.2 oz (87.6 kg)   Height: 5' 1\" (1.549 m)         General: Alert, no distress  Skin: " Small postinflammatory macules on the arms, no burrowing under the skin noted, no lesions on the palms or in between the digits.  She has a series of 4 raised papules over the left shoulder, very faint surrounding erythema.  No areas of purulence    This visit lasted a total of 25 minutes.  Over 50% of the time was spent counseling regarding he management of bug bites, headaches, GERD.       Donis Quiroga,   Internal Medicine  [facility]

## 2021-06-21 NOTE — LETTER
Letter by Sean Mix MD at      Author: Sean Mix MD Service: -- Author Type: --    Filed:  Encounter Date: 11/19/2020 Status: (Other)         November 19, 2020     Patient: Sakshi White   YOB: 1995   Date of Visit: 11/19/2020       To Whom It May Concern:    It is my medical opinion that Sakshi White should remain out of work until November 29, 2020 because of an illness and will return to work on November 30, 2020 without restrictions.    If you have any questions or concerns, please don't hesitate to call.    Sincerely,        Electronically signed by Sean Mix MD

## 2021-06-25 NOTE — TELEPHONE ENCOUNTER
Carolina calls in with possible infection in the index finger on her  Left hand.  It is not due to injury but she did have her nails done recently.  She states that the finger is red and swollen and some pain. . She denies pus, and any red line on the finger.  Per protocol she should be seen within 3 days.  Patient given home care measures per protocol.  Also told when to call back if further symptoms.  Patient agrees to this plan. Sent to scheduling.     Reason for Disposition    Swollen joint and no fever or redness    Redness and painful skin around fingernail (cuticle, nailfold)    Additional Information    Negative: Followed an injury    Negative: Wound looks infected    Negative: Caused by an animal bite    Negative: Caused by frostbite    Negative: Hand or wrist pain is the main symptom    Protocols used: FINGER PAIN-A-OH

## 2021-06-26 NOTE — PROGRESS NOTES
Subjective:      Sakshi White is a 26 y.o. female with chief complaint of possible finger infection.  She has had pain in her left distal index finger starting last night.  Pain seems to be near the nailbed.  No recent injuries to that finger.  She does not recall getting a sliver in the finger.  She notes that last Wednesday, 6/9/2021, she did have her nails done.  She does not recall any problems at that time.  Finger feels like it is throbbing or there is a lot of pressure in it.      PHQ9 = 4    Patient Active Problem List   Diagnosis     PCOS (polycystic ovarian syndrome)     Hirsutism     Vitamin D deficiency     Obesity (BMI 30-39.9)     Primary anovulatory infertility     Migraine without aura and without status migrainosus, not intractable     Gastroesophageal reflux disease without esophagitis     Severe major depression, single episode (H)       Current Outpatient Medications:      cephalexin (KEFLEX) 500 MG capsule, Take 1 capsule (500 mg total) by mouth 3 (three) times a day for 10 days., Disp: 30 capsule, Rfl: 0     medroxyPROGESTERone (PROVERA) 10 MG tablet, Take 10 mg by mouth daily., Disp: , Rfl:      omeprazole (PRILOSEC) 20 MG capsule, Take 1 capsule (20 mg total) by mouth daily as needed., Disp: 90 capsule, Rfl: 3     SF 5000 PLUS 1.1 % Crea, Sodium fluoride toothpaste, Disp: , Rfl:       Tobacco Use      Smoking status: Never Smoker      Smokeless tobacco: Never Used       Objective:     Allergies:  Patient has no known allergies.    Vitals:  Vitals:    06/14/21 1411   BP: 110/68   Pulse: 88   Resp: 16   SpO2: 100%     Body mass index is 31.39 kg/m .    Vital signs reviewed.  General: Patient is alert and oriented x 3, in no apparent distress  Cardiac: regular rate and rhythm, no murmurs  Pulmonary: lungs clear to auscultation bilaterally, no crackles, rales, rhonchi, or wheezing noted  Skin: Left index finger does not have any obvious edema or erythema, it is very sensitive to  touch      Assessment and Plan:   1. Finger infection  Patient does not recall any injuries to the finger.  Therefore, because of pain, and recent manicure, suspect that infection is the cause of her symptoms.  Prescription sent for Keflex.  I reviewed other symptomatic treatment including warm water soaks.  I discussed reasons for her to follow-up with us, including worsening of pain or worsening of other symptoms.  - cephalexin (KEFLEX) 500 MG capsule; Take 1 capsule (500 mg total) by mouth 3 (three) times a day for 10 days.  Dispense: 30 capsule; Refill: 0      This dictation uses voice recognition software, which may contain typographical errors.

## 2021-06-28 ENCOUNTER — OFFICE VISIT - HEALTHEAST (OUTPATIENT)
Dept: FAMILY MEDICINE | Facility: CLINIC | Age: 26
End: 2021-06-28

## 2021-06-28 DIAGNOSIS — Z63.4 BEREAVEMENT: ICD-10-CM

## 2021-06-28 DIAGNOSIS — F32.2 SEVERE MAJOR DEPRESSION, SINGLE EPISODE (H): ICD-10-CM

## 2021-06-28 DIAGNOSIS — G47.00 INSOMNIA, UNSPECIFIED TYPE: ICD-10-CM

## 2021-06-28 RX ORDER — ALPRAZOLAM 0.5 MG
TABLET ORAL
Qty: 30 TABLET | Refills: 0 | Status: SHIPPED | OUTPATIENT
Start: 2021-06-28 | End: 2022-01-16

## 2021-06-28 SDOH — SOCIAL STABILITY - SOCIAL INSECURITY: DISSAPEARANCE AND DEATH OF FAMILY MEMBER: Z63.4

## 2021-07-01 ENCOUNTER — RECORDS - HEALTHEAST (OUTPATIENT)
Dept: FAMILY MEDICINE | Facility: CLINIC | Age: 26
End: 2021-07-01

## 2021-07-01 DIAGNOSIS — F32.2 SEVERE MAJOR DEPRESSION, SINGLE EPISODE (H): ICD-10-CM

## 2021-07-01 DIAGNOSIS — G47.00 INSOMNIA, UNSPECIFIED TYPE: ICD-10-CM

## 2021-07-03 NOTE — ADDENDUM NOTE
Addendum Note by Alejandro Bennett MD at 5/2/2017  5:16 PM     Author: Alejandro Bennett MD Service: -- Author Type: Physician    Filed: 5/2/2017  5:16 PM Encounter Date: 5/1/2017 Status: Signed    : Alejandro Bennett MD (Physician)    Addended by: ALEJANDRO BENNETT on: 5/2/2017 05:16 PM        Modules accepted: Orders

## 2021-07-04 NOTE — TELEPHONE ENCOUNTER
Telephone Encounter by Hector Burr MA at 7/2/2021 11:07 AM     Author: Hector Burr MA Service: -- Author Type: Medical Assistant    Filed: 7/2/2021 11:07 AM Encounter Date: 7/1/2021 Status: Signed    : Hector Burr MA (Medical Assistant)       Message relayed

## 2021-07-04 NOTE — TELEPHONE ENCOUNTER
Telephone Encounter by Evy Seay RN at 7/1/2021  3:09 PM     Author: Evy Seay RN Service: -- Author Type: Registered Nurse    Filed: 7/1/2021  3:09 PM Encounter Date: 7/1/2021 Status: Signed    : Evy Seay RN (Registered Nurse)       Ok for referral?

## 2021-07-04 NOTE — TELEPHONE ENCOUNTER
Telephone Encounter by Jessica Galo at 7/1/2021  2:46 PM     Author: Jessica Galo Service: -- Author Type: Patient Access    Filed: 7/1/2021  2:47 PM Encounter Date: 7/1/2021 Status: Signed    : Jessica Galo (Patient Access)       Reason for Call:  Other      Detailed comments: pt is calling to ask for a referral for mental health therapy    Phone Number Patient can be reached at: Home number on file [phone] (home)    Best Time: any    Can we leave a detailed message on this number?: Yes    Call taken on 7/1/2021 at 2:46 PM by Jessica Galo

## 2021-07-04 NOTE — TELEPHONE ENCOUNTER
Telephone Encounter by Tita Stevenson PA-C at 7/1/2021  3:11 PM     Author: Tita Stevenson PA-C Service: -- Author Type: Physician Assistant    Filed: 7/1/2021  3:13 PM Encounter Date: 7/1/2021 Status: Signed    : Tita Stevenson PA-C (Physician Assistant)       I put in a referral for a counselor.  Depending on how long it takes to get in to see them, she can follow-up with me or another provider with a visit (in clinic or virtual) in the meantime if needed.

## 2021-07-05 PROBLEM — G47.00 INSOMNIA, UNSPECIFIED TYPE: Status: ACTIVE | Noted: 2021-06-28

## 2021-07-06 VITALS
SYSTOLIC BLOOD PRESSURE: 110 MMHG | WEIGHT: 180 LBS | OXYGEN SATURATION: 100 % | BODY MASS INDEX: 31.39 KG/M2 | RESPIRATION RATE: 16 BRPM | HEART RATE: 88 BPM | DIASTOLIC BLOOD PRESSURE: 68 MMHG

## 2021-07-06 VITALS
SYSTOLIC BLOOD PRESSURE: 116 MMHG | WEIGHT: 188 LBS | DIASTOLIC BLOOD PRESSURE: 70 MMHG | HEART RATE: 84 BPM | BODY MASS INDEX: 32.78 KG/M2

## 2021-07-06 ASSESSMENT — PATIENT HEALTH QUESTIONNAIRE - PHQ9: SUM OF ALL RESPONSES TO PHQ QUESTIONS 1-9: 4

## 2021-07-07 NOTE — PROGRESS NOTES
"  Subjective:      Sakshi White is a 26 y.o. female with chief complaint of depression and anxiety.  Chronic depression.  Seeing a counselor regularly.  She felt like her depression was under fairly good control until the beginning of this month.  She has had multiple stressors.  This month is the anniversary of a criminal being sentenced for murdering her uncle.  Her grandmother  on Saturday after a long illness.  Her grandmother  right in front of her while she was alone in the room with the grandmother.  This was of course traumatizing for patient.  Also last week her mother was notified that she was approved for kidney transplant.  Surgery was done within the past few days and her mom is in the hospital now.  Hoping to come home today.  Patient is applying for FMLA through work to stay home for about 8 weeks with her mom.  Normally patient takes Benadryl as needed for sleep.  She says is not really working recently.  She has taken trazodone and melatonin in the past for sleep.  She says they both helped her sleep, but she did not like how they made her feel the next day.  She works in a homeless shelter.  Work is going okay, but she does feel distracted.  She has missed work starting the end of last week.  She has not talked with her counselor yet since the death of her grandmother.  She works Tuesday to Saturday.  She notes people in her family do not really talk about grieving and emotions.  Her siblings were with her when her grandmother passed away, but patient was the only one in the room.    PHQ9 = 19  Patient did olaf \"several days\" for the question thoughts that you be better off dead or of hurting yourself in someway.  We discussed this today.  No immediate concerns.  Partner is supportive.  GAD7 = 17    Patient Active Problem List   Diagnosis     PCOS (polycystic ovarian syndrome)     Hirsutism     Vitamin D deficiency     Obesity (BMI 30-39.9)     Primary anovulatory infertility     Migraine " without aura and without status migrainosus, not intractable     Gastroesophageal reflux disease without esophagitis     Severe major depression, single episode (H)     Insomnia, unspecified type       Current Outpatient Medications:      medroxyPROGESTERone (PROVERA) 10 MG tablet, Take 10 mg by mouth daily., Disp: , Rfl:      omeprazole (PRILOSEC) 20 MG capsule, Take 1 capsule (20 mg total) by mouth daily as needed., Disp: 90 capsule, Rfl: 3     SF 5000 PLUS 1.1 % Crea, Sodium fluoride toothpaste, Disp: , Rfl:      ALPRAZolam (XANAX) 0.5 MG tablet, Take one half to one whole pill by mouth before bed, as needed for sleep.  Can also take during the day, as needed for anxiety, once every 8 hours., Disp: 30 tablet, Rfl: 0      Tobacco Use      Smoking status: Never Smoker      Smokeless tobacco: Never Used       Objective:     Allergies:  Patient has no known allergies.    Vitals:  Vitals:    06/28/21 1406   BP: 116/70   Pulse: 84     Body mass index is 32.78 kg/m .    Vital signs reviewed.  General: Patient is alert and oriented x 3, appropriately groomed, tearful throughout most of the visit       Assessment and Plan:   1. Severe major depression, single episode (H)  2. Bereavement  3. Insomnia, unspecified type  History of chronic depression, under fairly good control until recently when multiple stressors occurred, including the death of her grandmother.  Mother just had a kidney transplant last week.  Patient is planning on getting FMLA approved from her work so she can stay home to care for her mother for a few months.  If she is unable to get FMLA approved, she will let us know.  Benadryl not helping with sleep.  Trazodone and melatonin gave her unwanted side effects.  We discussed possibility of Ambien or Xanax.  Patient would like to try Xanax for sleep, and possibly anxiety during the day.  Discussed possibility of drowsiness if she takes it during the day.  Start with half a pill every 8 hours as needed.  If  she has any unwanted side effects she will contact us.  I reviewed she should not drink alcohol with this medicine.  She will not take Benadryl at the same time as this medicine.  I reviewed with her that this would be a temporary prescription, not something we would want her to be taking every day long-term.  She is going to be in contact with her counselor within the next day or so.  We will check in on her at the end of the week.  She will let us know sooner if other concerns.  Patient denies any thoughts of wanting to hurt him/herself or others and does contract for safety.  - ALPRAZolam (XANAX) 0.5 MG tablet; Take one half to one whole pill by mouth before bed, as needed for sleep.  Can also take during the day, as needed for anxiety, once every 8 hours.  Dispense: 30 tablet; Refill: 0    40 minutes spent on the date of the encounter doing chart review, history and exam, and documentation.      This dictation uses voice recognition software, which may contain typographical errors.

## 2021-08-11 ENCOUNTER — MYC MEDICAL ADVICE (OUTPATIENT)
Dept: FAMILY MEDICINE | Facility: CLINIC | Age: 26
End: 2021-08-11

## 2021-08-25 ENCOUNTER — OFFICE VISIT (OUTPATIENT)
Dept: FAMILY MEDICINE | Facility: CLINIC | Age: 26
End: 2021-08-25
Payer: COMMERCIAL

## 2021-08-25 VITALS
WEIGHT: 194 LBS | DIASTOLIC BLOOD PRESSURE: 8 MMHG | BODY MASS INDEX: 33.83 KG/M2 | TEMPERATURE: 97.9 F | HEART RATE: 71 BPM | SYSTOLIC BLOOD PRESSURE: 117 MMHG | RESPIRATION RATE: 14 BRPM

## 2021-08-25 DIAGNOSIS — M79.645 PAIN OF FINGER OF LEFT HAND: Primary | ICD-10-CM

## 2021-08-25 DIAGNOSIS — F32.2 SEVERE MAJOR DEPRESSION, SINGLE EPISODE (H): ICD-10-CM

## 2021-08-25 DIAGNOSIS — G47.00 INSOMNIA, UNSPECIFIED TYPE: ICD-10-CM

## 2021-08-25 PROCEDURE — 99214 OFFICE O/P EST MOD 30 MIN: CPT | Performed by: PHYSICIAN ASSISTANT

## 2021-08-25 RX ORDER — HYDROXYZINE HYDROCHLORIDE 25 MG/1
TABLET, FILM COATED ORAL
Qty: 35 TABLET | Refills: 0 | Status: SHIPPED | OUTPATIENT
Start: 2021-08-25 | End: 2022-03-22

## 2021-08-25 RX ORDER — SULFAMETHOXAZOLE/TRIMETHOPRIM 800-160 MG
TABLET ORAL
COMMUNITY
Start: 2021-04-05 | End: 2021-08-25

## 2021-08-25 NOTE — PROGRESS NOTES
"  Subjective:      Sakshi White is a 26 year old female with chief complaint of concerns:    1.  Finger pain.  She jammed her finger left index finger about 3 weeks ago.  It was painful right away.  Pain did improve a little bit, but then has not continued to improve.  Pain is along the entire left index finger, sometimes more focused at the MCP joint.  She can flex the finger, but it does not have the same range of motion as her other fingers, it feels very stiff.  She says when the injury happened it felt like \"something ripped.\"  She is left-hand dominant, so uses her left index finger very frequently.  It is hard for her to write with this pain.  She cannot make a super tight fist with the left hand.    Additionally, I was following up on her mental health today.  I had sent her a my chart message and she sent a response.  She did have at least one visit with a counselor.  However it was virtual (due in part to Covid pandemic), and she did not like that.  She is hoping to get an in person counselor.  However she has not yet followed up on that.  She took a Xanax for a little while but did not like how it made her feel and did not want to keep taking it.  She is pretty reluctant to take any daily medicine.  Dose continue to have trouble sleeping at times.  She was off work for all taking care of her mother, is back at work now.    Patient Active Problem List   Diagnosis     PCOS (polycystic ovarian syndrome)     Hirsutism     Vitamin D deficiency     Obesity (BMI 30-39.9)     Primary anovulatory infertility     Migraine without aura and without status migrainosus, not intractable     Gastroesophageal reflux disease without esophagitis     Severe major depression, single episode (H)     Insomnia, unspecified type       Current Outpatient Medications:      ALPRAZolam (XANAX) 0.5 MG tablet, [ALPRAZOLAM (XANAX) 0.5 MG TABLET] Take one half to one whole pill by mouth before bed, as needed for sleep.  Can also take " during the day, as needed for anxiety, once every 8 hours., Disp: 30 tablet, Rfl: 0     hydrOXYzine (ATARAX) 25 MG tablet, 1-2 pills before bed, as needed for sleep., Disp: 35 tablet, Rfl: 0     medroxyPROGESTERone (PROVERA) 10 MG tablet, [MEDROXYPROGESTERONE (PROVERA) 10 MG TABLET] Take 10 mg by mouth daily., Disp: , Rfl:      omeprazole (PRILOSEC) 20 MG capsule, [OMEPRAZOLE (PRILOSEC) 20 MG CAPSULE] Take 1 capsule (20 mg total) by mouth daily as needed., Disp: 90 capsule, Rfl: 3     SF 5000 PLUS 1.1 % Crea, [SF 5000 PLUS 1.1 % CREA] Sodium fluoride toothpaste, Disp: , Rfl:        Objective:     No Known Allergies  BP (!) 117/8 (BP Location: Left arm, Patient Position: Sitting, Cuff Size: Adult Large)   Pulse 71   Temp 97.9  F (36.6  C) (Temporal)   Resp 14   Wt 88 kg (194 lb)   LMP  (LMP Unknown)   BMI 33.83 kg/m    Body mass index is 33.83 kg/m .    Vitals reviewed as above.  General: Patient is alert and oriented x 3, in no apparent distress, slightly tearful at times  Musculoskeletal:       Assessment and Plan:     1. Pain of finger of left hand  Musculoskeletal in nature.  Could be tendon issue.  ROM limited, not improving.  Referral to Ortho/Hand for follow-up.  - Orthopedic  Referral; Future    2. Severe major depression, single episode (H)  3. Insomnia, unspecified type  She is reluctant to take medication.  She did not like how Xanax made her feel.  She does agree to try hydroxyzine for sleep, prescription sent, I discussed appropriate use.  I asked her to make follow-up with her counselor.  We will also try to see if we have a counselor who is doing in-person visits.  Follow-up in 1 month.  - hydrOXYzine (ATARAX) 25 MG tablet; 1-2 pills before bed, as needed for sleep.  Dispense: 35 tablet; Refill: 0      This dictation uses voice recognition software, which may contain typographical errors.

## 2021-08-25 NOTE — Clinical Note
This pt wants in-person counseling.  I know that's hard to come by these days.  Do you know of anyone who is doing this, or who to ask?  Thanks.

## 2021-08-30 ENCOUNTER — LAB (OUTPATIENT)
Dept: LAB | Facility: CLINIC | Age: 26
End: 2021-08-30
Payer: COMMERCIAL

## 2021-08-30 DIAGNOSIS — E66.9 OBESITY (BMI 30-39.9): ICD-10-CM

## 2021-08-30 DIAGNOSIS — G47.00 INSOMNIA, UNSPECIFIED TYPE: ICD-10-CM

## 2021-08-30 DIAGNOSIS — F32.2 SEVERE MAJOR DEPRESSION, SINGLE EPISODE (H): Primary | ICD-10-CM

## 2021-08-30 LAB — HBA1C MFR BLD: 5.6 % (ref 0–5.6)

## 2021-08-30 PROCEDURE — 36415 COLL VENOUS BLD VENIPUNCTURE: CPT

## 2021-08-30 PROCEDURE — 83036 HEMOGLOBIN GLYCOSYLATED A1C: CPT

## 2021-09-25 ENCOUNTER — HEALTH MAINTENANCE LETTER (OUTPATIENT)
Age: 26
End: 2021-09-25

## 2021-10-31 ENCOUNTER — MYC MEDICAL ADVICE (OUTPATIENT)
Dept: FAMILY MEDICINE | Facility: CLINIC | Age: 26
End: 2021-10-31

## 2021-10-31 DIAGNOSIS — L03.811 CELLULITIS OF HEAD EXCEPT FACE: Primary | ICD-10-CM

## 2021-11-01 RX ORDER — CEPHALEXIN 500 MG/1
500 CAPSULE ORAL 3 TIMES DAILY
Qty: 30 CAPSULE | Refills: 0 | Status: SHIPPED | OUTPATIENT
Start: 2021-11-01 | End: 2021-11-11

## 2021-11-22 ENCOUNTER — MYC MEDICAL ADVICE (OUTPATIENT)
Dept: FAMILY MEDICINE | Facility: CLINIC | Age: 26
End: 2021-11-22
Payer: MEDICAID

## 2021-11-22 DIAGNOSIS — N89.8 VAGINAL IRRITATION: Primary | ICD-10-CM

## 2021-11-22 RX ORDER — FLUCONAZOLE 150 MG/1
150 TABLET ORAL ONCE
Qty: 1 TABLET | Refills: 0 | Status: SHIPPED | OUTPATIENT
Start: 2021-11-22 | End: 2021-11-26

## 2021-11-26 RX ORDER — FLUCONAZOLE 150 MG/1
150 TABLET ORAL ONCE
Qty: 1 TABLET | Refills: 0 | Status: SHIPPED | OUTPATIENT
Start: 2021-11-26 | End: 2021-11-26

## 2021-12-06 ENCOUNTER — MYC MEDICAL ADVICE (OUTPATIENT)
Dept: FAMILY MEDICINE | Facility: CLINIC | Age: 26
End: 2021-12-06

## 2021-12-06 ENCOUNTER — E-VISIT (OUTPATIENT)
Dept: FAMILY MEDICINE | Facility: CLINIC | Age: 26
End: 2021-12-06

## 2021-12-06 DIAGNOSIS — B37.31 YEAST INFECTION OF THE VAGINA: Primary | ICD-10-CM

## 2021-12-06 DIAGNOSIS — J01.90 ACUTE NON-RECURRENT SINUSITIS, UNSPECIFIED LOCATION: ICD-10-CM

## 2021-12-06 PROCEDURE — 99421 OL DIG E/M SVC 5-10 MIN: CPT | Performed by: PHYSICIAN ASSISTANT

## 2021-12-06 RX ORDER — FLUCONAZOLE 150 MG/1
150 TABLET ORAL ONCE
Qty: 1 TABLET | Refills: 0 | Status: SHIPPED | OUTPATIENT
Start: 2021-12-06 | End: 2021-12-06

## 2021-12-06 NOTE — TELEPHONE ENCOUNTER
Patient had an e visit today by Tita BOLIVAR and treated for a sinus infection.    amoxicillin-clavulanate (AUGMENTIN) 875-125 MG tablet    No further action needed.    Merna Alaniz RN  [facility]

## 2022-01-16 ENCOUNTER — TELEPHONE (OUTPATIENT)
Dept: FAMILY MEDICINE | Facility: CLINIC | Age: 27
End: 2022-01-16
Payer: MEDICAID

## 2022-01-16 NOTE — LETTER
January 19, 2022      Sakshi White  [address]  [place] [address]        Dear Sakshi,     We have been trying to contact you, but was unable to reach you on three separate occasions. We wanted to see how is your depression and sleep is doing.     If you are still having problems with these things, please call the [facility] number above and make an appointment.            Sincerely,        Tita Stevenson PA-C

## 2022-01-17 NOTE — TELEPHONE ENCOUNTER
Please call pt and see how her mood is doing.  If she is still having problems with depression, anxiety, or sleep, she should make an appointment to discuss.  Thanks.

## 2022-01-17 NOTE — TELEPHONE ENCOUNTER
RN attempted to contact patient, but no answer. Left message on patient's voice mail to call clinic back.    Merna Alaniz RN  [facility]

## 2022-01-18 NOTE — TELEPHONE ENCOUNTER
RN made 2nd attempt to contact patient, but no answer. Left message on patient's voice mail to call clinic back.    Please call pt and see how her mood is doing.  If she is still having problems with depression, anxiety, or sleep, she should make an appointment to discuss.  Thanks.      Merna Alaniz RN  [facility]

## 2022-01-19 NOTE — TELEPHONE ENCOUNTER
RN attempt #3 to call pt regarding Tita's message below. No answer. Left non-detailed VM with call back number.    Third attempt in trying to reach pt. Letter will be sent out.     Closing encounter.    Routing back to Tita as JAMES.    Tita Stevenson, TAMIR         Please call pt and see how her mood is doing.  If she is still having problems with depression, anxiety, or sleep, she should make an appointment to discuss.  Thanks.

## 2022-01-31 NOTE — PATIENT INSTRUCTIONS
Dear Sakshi White    After reviewing your responses, I've been able to diagnose you with?a probable sinus infection.?   It's possible this could get better without antibiotics, so if you wanted to give it a few more days, you could.    Based on your responses and diagnosis, I have prescribed Augmentin to treat your symptoms. I have sent this to your pharmacy.?     It is also important to stay well hydrated, get lots of rest and take over-the-counter decongestants,?tylenol?or ibuprofen if you?are able to?take those medications per your primary care provider to help relieve discomfort.?     It is important that you take?all of?your prescribed medication even if your symptoms are improving after a few doses.? Taking?all of?your medicine helps prevent the symptoms from returning.?     If your symptoms worsen, you develop severe headache, vomiting, high fever (>102), or are not improving in 7 days, please contact your primary care provider for an appointment or visit any of our convenient Walk-in Care or Urgent Care Centers to be seen which can be found on our website?here.?     You could also consider being tested for COVID if you think that is appropriate.    Thanks again for choosing?us?as your health care partner,?   ?  Tita Stevenson PA-C?    no

## 2022-03-09 ENCOUNTER — MYC MEDICAL ADVICE (OUTPATIENT)
Dept: FAMILY MEDICINE | Facility: CLINIC | Age: 27
End: 2022-03-09
Payer: MEDICAID

## 2022-03-09 DIAGNOSIS — J39.2 THROAT IRRITATION: Primary | ICD-10-CM

## 2022-03-09 DIAGNOSIS — R13.10 DYSPHAGIA, UNSPECIFIED TYPE: ICD-10-CM

## 2022-03-12 ENCOUNTER — HEALTH MAINTENANCE LETTER (OUTPATIENT)
Age: 27
End: 2022-03-12

## 2022-03-21 RX ORDER — FLUCONAZOLE 150 MG/1
TABLET ORAL
COMMUNITY
Start: 2021-12-06 | End: 2022-03-22

## 2022-03-22 ENCOUNTER — OFFICE VISIT (OUTPATIENT)
Dept: FAMILY MEDICINE | Facility: CLINIC | Age: 27
End: 2022-03-22
Payer: MEDICAID

## 2022-03-22 VITALS
BODY MASS INDEX: 34.23 KG/M2 | HEART RATE: 76 BPM | SYSTOLIC BLOOD PRESSURE: 113 MMHG | DIASTOLIC BLOOD PRESSURE: 75 MMHG | HEIGHT: 62 IN | WEIGHT: 186 LBS | TEMPERATURE: 98.1 F

## 2022-03-22 DIAGNOSIS — J02.9 ACUTE PHARYNGITIS, UNSPECIFIED ETIOLOGY: ICD-10-CM

## 2022-03-22 DIAGNOSIS — G89.29 CHRONIC NONINTRACTABLE HEADACHE, UNSPECIFIED HEADACHE TYPE: ICD-10-CM

## 2022-03-22 DIAGNOSIS — B96.89 BACTERIAL VAGINOSIS: ICD-10-CM

## 2022-03-22 DIAGNOSIS — Z11.3 SCREEN FOR STD (SEXUALLY TRANSMITTED DISEASE): ICD-10-CM

## 2022-03-22 DIAGNOSIS — E28.2 PCOS (POLYCYSTIC OVARIAN SYNDROME): ICD-10-CM

## 2022-03-22 DIAGNOSIS — Z00.00 ANNUAL PHYSICAL EXAM: Primary | ICD-10-CM

## 2022-03-22 DIAGNOSIS — N76.0 BACTERIAL VAGINOSIS: ICD-10-CM

## 2022-03-22 DIAGNOSIS — K59.00 CONSTIPATION, UNSPECIFIED CONSTIPATION TYPE: ICD-10-CM

## 2022-03-22 DIAGNOSIS — Z12.4 CERVICAL CANCER SCREENING: ICD-10-CM

## 2022-03-22 DIAGNOSIS — R51.9 CHRONIC NONINTRACTABLE HEADACHE, UNSPECIFIED HEADACHE TYPE: ICD-10-CM

## 2022-03-22 DIAGNOSIS — Z11.59 NEED FOR HEPATITIS C SCREENING TEST: ICD-10-CM

## 2022-03-22 DIAGNOSIS — L68.0 HIRSUTISM: ICD-10-CM

## 2022-03-22 DIAGNOSIS — E66.9 OBESITY (BMI 30-39.9): ICD-10-CM

## 2022-03-22 DIAGNOSIS — J34.9 SINUS PROBLEM: ICD-10-CM

## 2022-03-22 DIAGNOSIS — N89.8 VAGINAL IRRITATION: ICD-10-CM

## 2022-03-22 LAB
ANION GAP SERPL CALCULATED.3IONS-SCNC: 12 MMOL/L (ref 5–18)
BUN SERPL-MCNC: 9 MG/DL (ref 8–22)
CALCIUM SERPL-MCNC: 9.7 MG/DL (ref 8.5–10.5)
CHLORIDE BLD-SCNC: 102 MMOL/L (ref 98–107)
CLUE CELLS: PRESENT
CO2 SERPL-SCNC: 24 MMOL/L (ref 22–31)
CREAT SERPL-MCNC: 0.66 MG/DL (ref 0.6–1.1)
GFR SERPL CREATININE-BSD FRML MDRD: >90 ML/MIN/1.73M2
GLUCOSE BLD-MCNC: 89 MG/DL (ref 70–125)
HBA1C MFR BLD: 5.6 % (ref 0–5.6)
HCV AB SERPL QL IA: NEGATIVE
HGB BLD-MCNC: 12.8 G/DL (ref 11.7–15.7)
HIV 1+2 AB+HIV1 P24 AG SERPL QL IA: NEGATIVE
POTASSIUM BLD-SCNC: 4.1 MMOL/L (ref 3.5–5)
SODIUM SERPL-SCNC: 138 MMOL/L (ref 136–145)
TRICHOMONAS, WET PREP: ABNORMAL
TSH SERPL DL<=0.005 MIU/L-ACNC: 1.14 UIU/ML (ref 0.3–5)
WBC'S/HIGH POWER FIELD, WET PREP: ABNORMAL
YEAST, WET PREP: ABNORMAL

## 2022-03-22 PROCEDURE — 36415 COLL VENOUS BLD VENIPUNCTURE: CPT | Performed by: PHYSICIAN ASSISTANT

## 2022-03-22 PROCEDURE — G0123 SCREEN CERV/VAG THIN LAYER: HCPCS | Performed by: PHYSICIAN ASSISTANT

## 2022-03-22 PROCEDURE — 87591 N.GONORRHOEAE DNA AMP PROB: CPT | Performed by: PHYSICIAN ASSISTANT

## 2022-03-22 PROCEDURE — 87210 SMEAR WET MOUNT SALINE/INK: CPT | Performed by: PHYSICIAN ASSISTANT

## 2022-03-22 PROCEDURE — 86803 HEPATITIS C AB TEST: CPT | Performed by: PHYSICIAN ASSISTANT

## 2022-03-22 PROCEDURE — 80048 BASIC METABOLIC PNL TOTAL CA: CPT | Performed by: PHYSICIAN ASSISTANT

## 2022-03-22 PROCEDURE — 83036 HEMOGLOBIN GLYCOSYLATED A1C: CPT | Performed by: PHYSICIAN ASSISTANT

## 2022-03-22 PROCEDURE — 87389 HIV-1 AG W/HIV-1&-2 AB AG IA: CPT | Performed by: PHYSICIAN ASSISTANT

## 2022-03-22 PROCEDURE — 86780 TREPONEMA PALLIDUM: CPT | Performed by: PHYSICIAN ASSISTANT

## 2022-03-22 PROCEDURE — 84443 ASSAY THYROID STIM HORMONE: CPT | Performed by: PHYSICIAN ASSISTANT

## 2022-03-22 PROCEDURE — 99214 OFFICE O/P EST MOD 30 MIN: CPT | Mod: 25 | Performed by: PHYSICIAN ASSISTANT

## 2022-03-22 PROCEDURE — 85018 HEMOGLOBIN: CPT | Performed by: PHYSICIAN ASSISTANT

## 2022-03-22 PROCEDURE — 99395 PREV VISIT EST AGE 18-39: CPT | Performed by: PHYSICIAN ASSISTANT

## 2022-03-22 PROCEDURE — 87491 CHLMYD TRACH DNA AMP PROBE: CPT | Performed by: PHYSICIAN ASSISTANT

## 2022-03-22 NOTE — PROGRESS NOTES
"Answers for HPI/ROS submitted by the patient on 3/22/2022  Frequency of exercise:: 2-3 days/week  Getting at least 3 servings of Calcium per day:: Yes  Diet:: Vegetarian/vegan  Taking medications regularly:: Yes  Medication side effects:: None  Bi-annual eye exam:: Yes  Dental care twice a year:: NO  Sleep apnea or symptoms of sleep apnea:: Daytime drowsiness, Excessive snoring  Additional concerns today:: No  Duration of exercise:: 15-30 minutes    SUBJECTIVE    Sakshi White is a 26 year old female who presents for an annual exam.    Other concerns today:  1.  Irregular menstrual cycles.  History of PCOS.  Has seen OB/GYN in the past.  Last period was 10/31/2020.  She is having a little bit of discharge.  She is wondering if she has a yeast infection.  She did take a leftover fluconazole and said that symptoms have resolved.  She does note occasionally pain on the left side with intercourse.  In the past she was taking what sounds like progesterone for 10 days at a time to trigger her menstrual cycle.  She is not taking any hormones or anything for birth control right now.    2.  Nasal and sinus concerns.  She feels like her nose is running and that her sinuses are filled with fluid.  This is been for a few months.  On and off congestion.  Claritin helps a little, Mucinex has not helped at all.  No other obvious changes or triggers.  She had discussed these concerns with me over virtual visit/MyChart visit in the past.  Referral made to ENT.  She has an appointment with ENT tomorrow.    3.  Headaches.  Chronic problem.  Says these have been going on \"a while.\"  She says when she was a teenager she had them frequently, then they were better for several years, no other back.  She says she has a headache daily, lasting all day.  Tylenol and ibuprofen do help temporarily.  Pain primarily in the frontal area of the forehead.  She describes it as feeling like pressure or tightness.  She has had her eyes checked within " the past year.  Not take anything for birth control right now.  She notes when she has headaches is hard to focus and do her normal activities.  Vision is normal during these episodes.    4.  Constipation.  Chronic problem for her.  Reviewed appropriate diet.  She notes she takes magnesium citrate about once a week.  Feels like this is working well for her.    5.  Follow-up mood.  She feels like mood is doing better.  She is not really doing counseling right now.  She is still reluctant to take medication.  I prescribed hydroxyzine for her in the past to use her sleep.  She does not think she ever picked this up.  Patient denies any thoughts of wanting to hurt him/herself or others and does contract for safety.  PHQ-9 today = 9      Patient Active Problem List    Diagnosis Date Noted     Insomnia, unspecified type 06/28/2021     Priority: Medium     Gastroesophageal reflux disease without esophagitis 08/29/2018     Priority: Medium     Migraine without aura and without status migrainosus, not intractable 06/25/2018     Priority: Medium     Primary anovulatory infertility 03/01/2018     Priority: Medium     Clomid, Letrozole and Ovidrel use from [id number], reproductive studies   [place] [facility] [place] and in 2017 at .    [facility] to [facility] 3/1/18         Obesity (BMI 30-39.9) 02/24/2018     Priority: Medium     Vitamin D deficiency 02/06/2018     Priority: Medium     PCOS (polycystic ovarian syndrome) 09/28/2016     Priority: Medium     Hirsutism 09/28/2016     Priority: Medium     Severe major depression, single episode (H) 05/13/2014     Priority: Medium        Immunization History   Administered Date(s) Administered     COVID-19,PF,Moderna 05/13/2021, 06/17/2021, 02/22/2022     DTAP (<7y) 1993, 1993, 04/10/1994, 07/25/1997, 07/29/1998     FLU 6-35 months 10/28/2014     Flu, Unspecified 12/04/2008, 11/19/2010, 11/28/2014     HPV Quadrivalent 12/04/2008, 11/19/2010,  02/16/2012     Hep B, Peds or Adolescent 07/25/1997     HepA, Unspecified 12/04/2008     HepB, Unspecified 04/12/1996, 07/15/1996     Influenza (IIV3) PF 11/19/2010, 11/28/2014     Influenza Intranasal Vaccine 12/04/2008     MMR 09/12/1994, 07/25/1997     Meningococcal (Menactra ) 12/04/2008     Pedvax-hib 1993, 1993, 02/10/1994     Poliovirus, inactivated (IPV) 1993, 1993, 07/25/1997, 07/29/1998     TD (ADULT, 7+) 02/12/2020     Td (Adult), Adsorbed 04/21/2005     Tdap (Adacel,Boostrix) 04/21/2005, 12/04/2008     Varicella 07/25/1997, 12/04/2008       Gynecologic History  Patient's last menstrual period was 10/31/2020 (approximate).  Contraception: none  Last pap: 2019    OB History   No obstetric history on file.       Current Outpatient Medications   Medication     omeprazole (PRILOSEC) 20 MG capsule     SF 5000 PLUS 1.1 % Crea     No current facility-administered medications for this visit.       Past Medical History:   Diagnosis Date     Depression     talk therapy     PCOS (polycystic ovarian syndrome)      Secondary amenorrhea      Vaginitis      Varicella     as child       Past Surgical History:   Procedure Laterality Date     PELVIC LAPAROSCOPY  08/19/2015     WRIST GANGLION EXCISION Right 2019        Family History   Problem Relation Age of Onset     Hypertension Mother      Kidney Disease Mother      Depression Mother      Diabetes Father      Hypertension Father      Cancer Maternal Grandmother         unsure type, organ failure, used to be drug addict     Diabetes Paternal Grandmother      Vision Loss Paternal Grandmother      Hypertension Paternal Grandmother      No Known Problems Sister      No Known Problems Brother      No Known Problems Brother      No Known Problems Sister      No Known Problems Sister      No Known Problems Sister           Review of Systems  Complete Review of Systems is discussed with patient and is negative except as noted in  "HPI.        OBJECTIVE    Vitals:    03/22/22 1129   BP: 113/75   BP Location: Left arm   Patient Position: Sitting   Cuff Size: Adult Large   Pulse: 76   Temp: 98.1  F (36.7  C)   TempSrc: Temporal   Weight: 84.4 kg (186 lb)   Height: 1.565 m (5' 1.61\")       Body mass index is 34.45 kg/m .    Physical Exam:  General: patient is alert and oriented x 3, in no apparent distress  HEENT, Thyroid, Lymphatic, Cardiac, Pulmonary, GI, Musculoskeletal, Skin, and Neuro exams were completed today and grossly normal  Breast exam: Patient declined  Genitourinary: External genitalia is normal in appearance, vaginal walls are healthy, cervix is well visualized and normal in appearance, no significant discharge noted, pap taken without difficulty    Recent Results (from the past 24 hour(s))   Hemoglobin A1c    Collection Time: 03/22/22 12:23 PM   Result Value Ref Range    Hemoglobin A1C 5.6 0.0 - 5.6 %   TSH with free T4 reflex    Collection Time: 03/22/22 12:23 PM   Result Value Ref Range    TSH 1.14 0.30 - 5.00 uIU/mL   Basic metabolic panel    Collection Time: 03/22/22 12:23 PM   Result Value Ref Range    Sodium 138 136 - 145 mmol/L    Potassium 4.1 3.5 - 5.0 mmol/L    Chloride 102 98 - 107 mmol/L    Carbon Dioxide (CO2) 24 22 - 31 mmol/L    Anion Gap 12 5 - 18 mmol/L    Urea Nitrogen 9 8 - 22 mg/dL    Creatinine 0.66 0.60 - 1.10 mg/dL    Calcium 9.7 8.5 - 10.5 mg/dL    Glucose 89 70 - 125 mg/dL    GFR Estimate >90 >60 mL/min/1.73m2   HIV Antigen Antibody Combo    Collection Time: 03/22/22 12:23 PM   Result Value Ref Range    HIV Antigen Antibody Combo Negative Negative   Hepatitis C antibody    Collection Time: 03/22/22 12:23 PM   Result Value Ref Range    Hepatitis C Antibody Negative Negative   Hemoglobin    Collection Time: 03/22/22 12:23 PM   Result Value Ref Range    Hemoglobin 12.8 11.7 - 15.7 g/dL   Wet preparation    Collection Time: 03/22/22 12:38 PM    Specimen: Vagina; Swab   Result Value Ref Range    Trichomonas " Absent Absent    Yeast Absent Absent    Clue Cells Present (A) Absent    WBCs/high power field 1+ (A) None       Other labs pending.    ASSESSMENT and PLAN      1. Annual physical exam  Health maintenance is discussed with patient as appropriate for age and risk factors.  Pap completed today.  Screening labs completed and I will follow up with results.  She declined offer of birth control today.  - Hemoglobin A1c  - TSH with free T4 reflex  - Basic metabolic panel  - Hemoglobin; Future  - Pap imaged thin layer screen reflex to HPV if ASCUS - recommend age 25 - 29  - Hemoglobin    2. Screen for STD (sexually transmitted disease)  No known exposures.  I will forward to follow-up with results when available.  - HIV Antigen Antibody Combo; Future  - Hepatitis C antibody; Future  - Treponema Abs w Reflex to RPR and Titer; Future  - Wet preparation  - Chlamydia trachomatis/Neisseria gonorrhoeae by PCR  - HIV Antigen Antibody Combo  - Hepatitis C antibody  - Treponema Abs w Reflex to RPR and Titer    3. Vaginal irritation  She thinks she is getting chronic yeast infections and asked to have yeast medicine with refills.  However wet prep today is positive for clue cells.  Treat for BV and monitor.    4. Chronic nonintractable headache, unspecified headache type  Neuro exam is completely normal today.  Screening labs ordered and I will follow up with results.  We discussed further evaluation.  She has had her eyes checked in the past year.  No red flags on exam or history.  We discussed following up with neurology versus focusing on her other more urgent concerns first.  Patient would like to focus on other concerns first and then monitor this.  - Hemoglobin; Future  - Hemoglobin    5. Acute pharyngitis, unspecified etiology  6. Sinus problem  Patient has an appointment to see ENT tomorrow to follow-up on this problem.    7. Constipation, unspecified constipation type  Chronic.  Managed with magnesium citrate.  Uses it about  once a week.  Continue to monitor.  Consider GI consult in future.  - TSH with free T4 reflex  - Basic metabolic panel    8. PCOS (polycystic ovarian syndrome)  9. Hirsutism  Is not getting her period at all.  Was taking what sounds like progesterone for 10 days at a time in the past.  She wants to follow-up with OB/GYN regarding her periods and PCOS.  Referral placed.  - Hemoglobin A1c  - TSH with free T4 reflex  - Comprehensive Weight Management; Future          This dictation uses voice recognition software, which may contain typographical errors.

## 2022-03-23 ENCOUNTER — OFFICE VISIT (OUTPATIENT)
Dept: OTOLARYNGOLOGY | Facility: CLINIC | Age: 27
End: 2022-03-23
Attending: PHYSICIAN ASSISTANT
Payer: MEDICAID

## 2022-03-23 ENCOUNTER — MYC MEDICAL ADVICE (OUTPATIENT)
Dept: FAMILY MEDICINE | Facility: CLINIC | Age: 27
End: 2022-03-23

## 2022-03-23 DIAGNOSIS — J39.2 THROAT IRRITATION: ICD-10-CM

## 2022-03-23 LAB
C TRACH DNA SPEC QL PROBE+SIG AMP: NEGATIVE
N GONORRHOEA DNA SPEC QL NAA+PROBE: NEGATIVE
T PALLIDUM AB SER QL: NONREACTIVE

## 2022-03-23 PROCEDURE — 99243 OFF/OP CNSLTJ NEW/EST LOW 30: CPT | Performed by: OTOLARYNGOLOGY

## 2022-03-23 RX ORDER — METRONIDAZOLE 500 MG/1
500 TABLET ORAL 2 TIMES DAILY
Qty: 14 TABLET | Refills: 0 | Status: SHIPPED | OUTPATIENT
Start: 2022-03-23 | End: 2022-03-30

## 2022-03-23 ASSESSMENT — PATIENT HEALTH QUESTIONNAIRE - PHQ9: SUM OF ALL RESPONSES TO PHQ QUESTIONS 1-9: 10

## 2022-03-23 NOTE — LETTER
3/23/2022         RE: Sakshi White  [address]  [address]  [place] [address]        Dear Colleague,    Thank you for referring your patient, Sakshi White, to the [facility]. Please see a copy of my visit note below.    HPI: This patient is a 27yo F who presents for evaluation of the throat at the request of Tita Stevenson PA-C. There has been a globus sensation since about last October. She states that when it was bad, she didn't have health coverage and couldn't come in for eval. She almost cancelled this appointment as her symptoms have basically resolved. Denies fevers, otalgia, weight loss, odynophagia, dysphagia, hemoptysis, and shortness of breath. Does not complain of sour taste, heartburn, or burping. However, she was diagnosed with reflux and has been taking reflux medication now for a few months.    Past medical history, surgical history, social history, family history, medications, and allergies have been reviewed with the patient and are documented above.    Review of Systems: a 10-system review was performed. Pertinent positives are noted in the HPI and on a separate scanned document in the chart.    PHYSICAL EXAMINATION:  GEN: no acute distress, normocephalic. Obese.  EYES: extraocular movements are intact, pupils are equal and round. Sclera clear.   EARS: auricles are normally formed. The external auditory canals are clear with minimal to no cerumen. Tympanic membranes are intact bilaterally with no signs of infection, effusion, retractions, or perforations.  NOSE: anterior nares are patent. There are no masses or lesions. The septum is non-obstructing.  OC/OP: clear, dentition is in good repair. The tongue and palate are fully mobile and symmetric. No masses or lesions. Cobblestoning of the posterior pharyngeal wall.  HP/L (scope): patient declined  NECK: soft and supple. No lymphadenopathy or masses. Airway is midline.  NEURO: CN VII and XII symmetric. alert  and oriented. No spontaneous nystagmus. Gait is normal.  PULM: breathing comfortably on room air, normal chest expansion with respiration  CARDS: no cyanosis or clubbing, normal carotid pulses    FLEXIBLE LARYNGOSCOPY: patient declined    MEDICAL DECISION-MAKING: This patient is a 27yo F with resolved throat symptoms of globus sensation that were likely due to acid reflux. She has been on medical reflux therapy for a few months and her symptoms are now gone. Advised that if the symptoms flare up again, to come back for an assessment when active.         Again, thank you for allowing me to participate in the care of your patient.        Sincerely,        Aracelis Roberts MD

## 2022-03-23 NOTE — PROGRESS NOTES
HPI: This patient is a 25yo F who presents for evaluation of the throat at the request of Tita Stevenson PA-C. There has been a globus sensation since about last October. She states that when it was bad, she didn't have health coverage and couldn't come in for eval. She almost cancelled this appointment as her symptoms have basically resolved. Denies fevers, otalgia, weight loss, odynophagia, dysphagia, hemoptysis, and shortness of breath. Does not complain of sour taste, heartburn, or burping. However, she was diagnosed with reflux and has been taking reflux medication now for a few months.    Past medical history, surgical history, social history, family history, medications, and allergies have been reviewed with the patient and are documented above.    Review of Systems: a 10-system review was performed. Pertinent positives are noted in the HPI and on a separate scanned document in the chart.    PHYSICAL EXAMINATION:  GEN: no acute distress, normocephalic. Obese.  EYES: extraocular movements are intact, pupils are equal and round. Sclera clear.   EARS: auricles are normally formed. The external auditory canals are clear with minimal to no cerumen. Tympanic membranes are intact bilaterally with no signs of infection, effusion, retractions, or perforations.  NOSE: anterior nares are patent. There are no masses or lesions. The septum is non-obstructing.  OC/OP: clear, dentition is in good repair. The tongue and palate are fully mobile and symmetric. No masses or lesions. Cobblestoning of the posterior pharyngeal wall.  HP/L (scope): patient declined  NECK: soft and supple. No lymphadenopathy or masses. Airway is midline.  NEURO: CN VII and XII symmetric. alert and oriented. No spontaneous nystagmus. Gait is normal.  PULM: breathing comfortably on room air, normal chest expansion with respiration  CARDS: no cyanosis or clubbing, normal carotid pulses    FLEXIBLE LARYNGOSCOPY: patient declined    MEDICAL  DECISION-MAKING: This patient is a 27yo F with resolved throat symptoms of globus sensation that were likely due to acid reflux. She has been on medical reflux therapy for a few months and her symptoms are now gone. Advised that if the symptoms flare up again, to come back for an assessment when active.

## 2022-03-24 ENCOUNTER — MYC MEDICAL ADVICE (OUTPATIENT)
Dept: FAMILY MEDICINE | Facility: CLINIC | Age: 27
End: 2022-03-24
Payer: MEDICAID

## 2022-03-24 DIAGNOSIS — E28.2 PCOS (POLYCYSTIC OVARIAN SYNDROME): Primary | ICD-10-CM

## 2022-03-24 DIAGNOSIS — N76.0 BACTERIAL VAGINOSIS: ICD-10-CM

## 2022-03-24 DIAGNOSIS — K21.9 GASTROESOPHAGEAL REFLUX DISEASE WITHOUT ESOPHAGITIS: ICD-10-CM

## 2022-03-24 DIAGNOSIS — B96.89 BACTERIAL VAGINOSIS: ICD-10-CM

## 2022-03-24 DIAGNOSIS — N92.6 IRREGULAR MENSES: ICD-10-CM

## 2022-03-24 LAB
BKR LAB AP GYN ADEQUACY: NORMAL
BKR LAB AP GYN INTERPRETATION: NORMAL
BKR LAB AP GYN OTHER FINDINGS: NORMAL
BKR LAB AP HPV REFLEX: NORMAL
BKR LAB AP LMP: NORMAL
BKR LAB AP PREVIOUS ABNORMAL: NORMAL
PATH REPORT.COMMENTS IMP SPEC: NORMAL
PATH REPORT.COMMENTS IMP SPEC: NORMAL
PATH REPORT.RELEVANT HX SPEC: NORMAL

## 2022-03-24 RX ORDER — METRONIDAZOLE 7.5 MG/G
1 GEL VAGINAL AT BEDTIME
Qty: 45 G | Refills: 0 | Status: SHIPPED | OUTPATIENT
Start: 2022-03-24 | End: 2022-03-29

## 2022-03-24 RX ORDER — FAMOTIDINE 20 MG/1
20 TABLET, FILM COATED ORAL DAILY PRN
Qty: 90 TABLET | Refills: 0 | Status: SHIPPED | OUTPATIENT
Start: 2022-03-24 | End: 2022-11-19

## 2022-03-27 PROBLEM — N76.0 BACTERIAL VAGINOSIS: Status: ACTIVE | Noted: 2022-03-27

## 2022-03-27 PROBLEM — B96.89 BACTERIAL VAGINOSIS: Status: ACTIVE | Noted: 2022-03-27

## 2022-04-12 ENCOUNTER — TRANSFERRED RECORDS (OUTPATIENT)
Dept: HEALTH INFORMATION MANAGEMENT | Facility: CLINIC | Age: 27
End: 2022-04-12
Payer: COMMERCIAL

## 2022-07-26 ENCOUNTER — NURSE TRIAGE (OUTPATIENT)
Dept: FAMILY MEDICINE | Facility: CLINIC | Age: 27
End: 2022-07-26

## 2022-07-26 ENCOUNTER — MYC MEDICAL ADVICE (OUTPATIENT)
Dept: FAMILY MEDICINE | Facility: CLINIC | Age: 27
End: 2022-07-26

## 2022-07-26 NOTE — TELEPHONE ENCOUNTER
Please see nurse triage for more information.            Marina Smith RN  [facility]

## 2022-07-26 NOTE — TELEPHONE ENCOUNTER
RN called patient regarding her [employer] message.     Nurse Triage SBAR    Is this a 2nd Level Triage? NO    Situation:   I been having a brownish with a little odor discharge coming from my vagina for a little over week now. Do I need to be seen? Also I wondering can come in and do some std testing?    Background:   Patient has having itching and discharge symptoms for a week now.    Assessment:   Patient denied fever, bleeding, or pain.    Protocol Recommended Disposition:   See PCP Within 3 Days    Recommendation:   No appointment available this week. Advised patient to be seen in different clinic or walk in care. Patient verbalized understanding and agreed with the plan.      RN transferred patient to central scheduling to make an appointment.       RN will route to KATT Stevenson, as [facility].      Marina Smith RN  [facility]            Reason for Disposition    Patient is worried about sexually transmitted disease (STD)    Additional Information    Negative: Sounds like a life-threatening emergency to the triager    Negative: Followed a genital area injury    Negative: Foreign body in vagina (e.g., tampon)    Negative: Vaginal bleeding is main symptom    Negative: Vaginal discharge is main symptom    Negative: Pain or burning with passing urine (urination) is main symptom    Negative: Menstrual cramps is main symptom    Negative: Abdomen pain is main symptom    Negative: Pubic lice suspected    Negative: Itching or rash of external female genital area (vulva)    Negative: Labor suspected    Negative: Patient sounds very sick or weak to the triager    Negative: [1] SEVERE pain AND [2] not improved 2 hours after pain medicine    Negative: [1] Genital area looks infected (e.g., draining sore, spreading redness) AND [2] fever    Negative: [1] Something is hanging out of the vagina AND [2] can't easily be pushed back inside    Negative: MODERATE-SEVERE itching (i.e., interferes with school, work,  "or sleep)    Negative: [1] MILD-MODERATE pain AND [2] present > 24 hours    Negative: Genital area looks infected (e.g., draining sore, spreading redness)    Negative: Rash with painful tiny water blisters    Negative: [1] Rash (e.g., redness, tiny bumps, sore) of genital area AND [2] present > 24 hours    Negative: Tender lump (swelling or \"ball\") at vaginal opening    Negative: [1] Symptoms of a yeast infection (i.e., itchy, white discharge, not bad smelling) AND [2] not improved > 3 days following CARE ADVICE    Negative: [1] Vaginal itching AND [2] not improved > 3 days following CARE ADVICE    Answer Assessment - Initial Assessment Questions  1. SYMPTOM: \"What's the main symptom you're concerned about?\" (e.g., pain, itching, dryness)      Itching and discharge    2. LOCATION: \"Where is the itching located?\" (e.g., inside/outside, left/right)  Inside    3. ONSET: \"When did the itching  start?\"  A week ago    4. PAIN: \"Is there any pain?\" If so, ask: \"How bad is it?\" (Scale: 1-10; mild, moderate, severe)  No    5. ITCHING: \"Is there any itching?\" If so, ask: \"How bad is it?\" (Scale: 1-10; mild, moderate, severe)  Mild    6. CAUSE: \"What do you think is causing the discharge?\" \"Have you had the same problem before? What happened then?\"  Not sure    7. OTHER SYMPTOMS: \"Do you have any other symptoms?\" (e.g., fever, itching, vaginal bleeding, pain with urination, injury to genital area, vaginal foreign body)  Itching    8. PREGNANCY: \"Is there any chance you are pregnant?\" \"When was your last menstrual period?\"  No    Protocols used: VAGINAL SYMPTOMS-A-AH      "

## 2022-08-16 ENCOUNTER — TRANSFERRED RECORDS (OUTPATIENT)
Dept: HEALTH INFORMATION MANAGEMENT | Facility: CLINIC | Age: 27
End: 2022-08-16

## 2022-08-16 ENCOUNTER — LAB REQUISITION (OUTPATIENT)
Dept: LAB | Facility: CLINIC | Age: 27
End: 2022-08-16

## 2022-08-16 DIAGNOSIS — Z31.41 ENCOUNTER FOR FERTILITY TESTING: ICD-10-CM

## 2022-08-16 PROCEDURE — 83002 ASSAY OF GONADOTROPIN (LH): CPT | Performed by: NURSE PRACTITIONER

## 2022-08-16 PROCEDURE — 83036 HEMOGLOBIN GLYCOSYLATED A1C: CPT | Performed by: NURSE PRACTITIONER

## 2022-08-16 PROCEDURE — 83001 ASSAY OF GONADOTROPIN (FSH): CPT | Performed by: NURSE PRACTITIONER

## 2022-08-17 LAB
FSH SERPL IRP2-ACNC: 6 MIU/ML
HBA1C MFR BLD: 6 %
LH SERPL-ACNC: 13.6 MIU/ML

## 2022-08-23 ENCOUNTER — TRANSFERRED RECORDS (OUTPATIENT)
Dept: HEALTH INFORMATION MANAGEMENT | Facility: CLINIC | Age: 27
End: 2022-08-23

## 2022-08-24 PROBLEM — R73.03 PRE-DIABETES: Status: ACTIVE | Noted: 2022-08-24

## 2022-08-29 ENCOUNTER — TRANSFERRED RECORDS (OUTPATIENT)
Dept: HEALTH INFORMATION MANAGEMENT | Facility: CLINIC | Age: 27
End: 2022-08-29

## 2022-09-14 ENCOUNTER — MEDICAL CORRESPONDENCE (OUTPATIENT)
Dept: HEALTH INFORMATION MANAGEMENT | Facility: CLINIC | Age: 27
End: 2022-09-14

## 2022-09-16 ENCOUNTER — HOSPITAL ENCOUNTER (OUTPATIENT)
Dept: GENERAL RADIOLOGY | Facility: CLINIC | Age: 27
Discharge: HOME | End: 2022-09-16
Attending: NURSE PRACTITIONER | Admitting: NURSE PRACTITIONER
Payer: COMMERCIAL

## 2022-09-16 DIAGNOSIS — Z31.41 ENCOUNTER FOR FERTILITY TESTING: ICD-10-CM

## 2022-09-16 PROCEDURE — 255N000002 HC RX 255 OP 636: Performed by: NURSE PRACTITIONER

## 2022-09-16 PROCEDURE — 74740 X-RAY FEMALE GENITAL TRACT: CPT

## 2022-09-16 RX ORDER — IOPAMIDOL 510 MG/ML
50 INJECTION, SOLUTION INTRAVASCULAR ONCE
Status: COMPLETED | OUTPATIENT
Start: 2022-09-16 | End: 2022-09-16

## 2022-09-16 RX ADMIN — IOPAMIDOL 6 ML: 510 INJECTION, SOLUTION INTRAVASCULAR at 08:48

## 2022-09-16 NOTE — DISCHARGE INSTRUCTIONS
HSG (Hysterosalpingogram) Discharge Instructions    Diet and medicines:  You may go back to your normal diet and medicines.  You may take Tylenol (acetaminophen) or Advil (ibuprofen).  Your doctor has prescribed: ___________________________________    Activity: You may go back to your normal routine.    Side effects:  Expect mild cramping today.  You may have bloody spotting or brown, sticky discharge for up to two days.    Other instructions:     Call your doctor if you have:  Hives.  Problems breathing.  Swelling.  Signs of infection, which include:  A fever over 101  F (38.3  C), taken under the tongue.  Unusual discharge from your vagina.  Pain in lower abdomen (belly).    Questions?   Call your hospital:   [facility] [phone]    Patient: ____________________________________    Instructor: __________________________________   Time: ________ Date: ____________

## 2022-09-26 ENCOUNTER — TRANSCRIBE ORDERS (OUTPATIENT)
Dept: OTHER | Age: 27
End: 2022-09-26

## 2022-09-26 DIAGNOSIS — E28.2 POLYCYSTIC OVARY SYNDROME: Primary | ICD-10-CM

## 2022-10-15 ENCOUNTER — MYC MEDICAL ADVICE (OUTPATIENT)
Dept: FAMILY MEDICINE | Facility: CLINIC | Age: 27
End: 2022-10-15

## 2022-10-17 NOTE — TELEPHONE ENCOUNTER
RN attempted to call patient regarding her mychart message.    Patient did not answer. Left message to patient to call the clinic back.        Marina Smith RN  [facility]

## 2022-10-18 ENCOUNTER — TELEPHONE (OUTPATIENT)
Dept: INTERNAL MEDICINE | Facility: CLINIC | Age: 27
End: 2022-10-18

## 2022-10-18 NOTE — TELEPHONE ENCOUNTER
Called patient assist in this TE as it is unclear what the patient was calling in regards to. Patient stated that she was responding to message that was left by RN for a mychart message that she sent ( see mychart message) to providers for symptoms that she was feeling. Advised her that TC can transfer her to talk to a nurse if she wishes, and patient stated that she is feeling better now and declined to speak to a nurse. Completing task.

## 2022-10-18 NOTE — TELEPHONE ENCOUNTER
Reason for Call:  Other call back    Detailed comments: PT WOULD LIKE A MESSAGE BACK IN [place] SHE DOES NOT HEAR HER PHONE ALL THE TIME   I DID TELL HER TO TRY AN EVISIT  IN [place]    SHE MAY TRY THAT    Phone Number Patient can be reached at: Cell number on file:    Telephone Information:   Mobile [phone]       Best Time: ANYTIME    Can we leave a detailed message on this number? YES    Call taken on 10/18/2022 at 12:01 PM by Darius Briones

## 2022-10-18 NOTE — TELEPHONE ENCOUNTER
Writer attempt to call pt back regarding pt's MyChart message symptoms. No answer, left VM with call back number.    If pt calls back, needs to be triaged for reported symptoms. Thanks.    CALLIE ClaireN, RN   [facility]

## 2022-10-18 NOTE — TELEPHONE ENCOUNTER
TC team got a message from central scheduling, ( See TE dated 10/18/2022.)  Message was unclear. Called patient back to ask what TC can help patient with, and she stated that she was responding to message from nurses. Patient stated that she is not feeling better and do not need to speak to anyone anymore. Closing out encounter.

## 2022-10-20 ENCOUNTER — NURSE TRIAGE (OUTPATIENT)
Dept: NURSING | Facility: CLINIC | Age: 27
End: 2022-10-20

## 2022-10-20 NOTE — TELEPHONE ENCOUNTER
TRIAGE CALL - Is this a 2nd Level Triage? NO  Nurse Triage SBAR - Patient calling   Situation:  pain stomach and back  On the Right side area - started hrs ago  Background:    Has been fighting a cold since Tuesday -Dayquil and Nyquil   Assessment:  Urgency with urination since Sunday  Pain 8/10  - she is about to cry    Had a bladder infection 2 years ago - had the same frequency   Denies dark color or burning sensation while urination  uriating last an hr or less ago  No drinking much fluids- she is not thirtsy  Patients denies fever, nausea, vomiting, or diarrhea.   Measures taken: Dayquil and Nyquil   Recommended Disposition per protocol see in 24 hrs   Pt s PCP/Clinic - She does not know.  [facility]  Care advise given and caller s questions were answered  Reminded we will be here 24/7 Call back nurse line with questions or concerns.  Mayela Lou RN [place] Nurse Advisor,  5:41 PM 10/20/2022    Reason for Disposition    Side (flank) or lower back pain present    Additional Information    Negative: Shock suspected (e.g., cold/pale/clammy skin, too weak to stand, low BP, rapid pulse)    Negative: Sounds like a life-threatening emergency to the triager    Negative: [1] Unable to urinate (or only a few drops) > 4 hours AND [2] bladder feels very full (e.g., palpable bladder or strong urge to urinate)    Negative: [1] Decreased urination and [2] drinking very little AND [2] dehydration suspected (e.g., dark urine, no urine > 12 hours, very dry mouth, very lightheaded)    Negative: Patient sounds very sick or weak to the triager    Negative: Fever > 100.4 F (38.0 C)    Protocols used: URINARY SYMPTOMS-A-AH

## 2022-10-21 ENCOUNTER — OFFICE VISIT (OUTPATIENT)
Dept: FAMILY MEDICINE | Facility: CLINIC | Age: 27
End: 2022-10-21
Payer: COMMERCIAL

## 2022-10-21 VITALS
HEART RATE: 89 BPM | SYSTOLIC BLOOD PRESSURE: 108 MMHG | OXYGEN SATURATION: 96 % | RESPIRATION RATE: 20 BRPM | BODY MASS INDEX: 34 KG/M2 | DIASTOLIC BLOOD PRESSURE: 78 MMHG | TEMPERATURE: 97.8 F | WEIGHT: 183.6 LBS

## 2022-10-21 DIAGNOSIS — R30.0 DYSURIA: Primary | ICD-10-CM

## 2022-10-21 DIAGNOSIS — N30.01 ACUTE CYSTITIS WITH HEMATURIA: ICD-10-CM

## 2022-10-21 DIAGNOSIS — R10.9 FLANK PAIN: ICD-10-CM

## 2022-10-21 LAB
ALBUMIN UR-MCNC: >=300 MG/DL
APPEARANCE UR: ABNORMAL
BACTERIA #/AREA URNS HPF: ABNORMAL /HPF
BILIRUB UR QL STRIP: NEGATIVE
COLOR UR AUTO: YELLOW
GLUCOSE UR STRIP-MCNC: NEGATIVE MG/DL
HGB UR QL STRIP: ABNORMAL
KETONES UR STRIP-MCNC: NEGATIVE MG/DL
LEUKOCYTE ESTERASE UR QL STRIP: ABNORMAL
NITRATE UR QL: POSITIVE
PH UR STRIP: 6 [PH] (ref 5–7)
RBC #/AREA URNS AUTO: ABNORMAL /HPF
SP GR UR STRIP: >=1.03 (ref 1–1.03)
SQUAMOUS #/AREA URNS AUTO: ABNORMAL /LPF
UROBILINOGEN UR STRIP-ACNC: 0.2 E.U./DL
WBC #/AREA URNS AUTO: ABNORMAL /HPF

## 2022-10-21 PROCEDURE — 87086 URINE CULTURE/COLONY COUNT: CPT | Performed by: PHYSICIAN ASSISTANT

## 2022-10-21 PROCEDURE — 87186 SC STD MICRODIL/AGAR DIL: CPT | Performed by: PHYSICIAN ASSISTANT

## 2022-10-21 PROCEDURE — 81001 URINALYSIS AUTO W/SCOPE: CPT | Performed by: PHYSICIAN ASSISTANT

## 2022-10-21 PROCEDURE — 99213 OFFICE O/P EST LOW 20 MIN: CPT | Performed by: PHYSICIAN ASSISTANT

## 2022-10-21 RX ORDER — MEDROXYPROGESTERONE ACETATE 10 MG
TABLET ORAL EVERY 24 HOURS
COMMUNITY
End: 2023-04-03

## 2022-10-21 RX ORDER — IBUPROFEN 600 MG/1
600 TABLET, FILM COATED ORAL EVERY 6 HOURS PRN
Qty: 60 TABLET | Refills: 0 | Status: SHIPPED | OUTPATIENT
Start: 2022-10-21 | End: 2024-02-12

## 2022-10-21 RX ORDER — SULFAMETHOXAZOLE/TRIMETHOPRIM 800-160 MG
1 TABLET ORAL 2 TIMES DAILY
Qty: 6 TABLET | Refills: 0 | Status: SHIPPED | OUTPATIENT
Start: 2022-10-21 | End: 2022-10-24

## 2022-10-21 RX ORDER — IBUPROFEN 200 MG
800 TABLET ORAL EVERY 6 HOURS PRN
COMMUNITY
End: 2023-04-03

## 2022-10-21 ASSESSMENT — PATIENT HEALTH QUESTIONNAIRE - PHQ9
SUM OF ALL RESPONSES TO PHQ QUESTIONS 1-9: 6
10. IF YOU CHECKED OFF ANY PROBLEMS, HOW DIFFICULT HAVE THESE PROBLEMS MADE IT FOR YOU TO DO YOUR WORK, TAKE CARE OF THINGS AT HOME, OR GET ALONG WITH OTHER PEOPLE: SOMEWHAT DIFFICULT
SUM OF ALL RESPONSES TO PHQ QUESTIONS 1-9: 6

## 2022-10-21 ASSESSMENT — PAIN SCALES - GENERAL: PAINLEVEL: MILD PAIN (2)

## 2022-10-21 NOTE — LETTER
My Depression Action Plan  Name: Sakshi White   Date of Birth 1995  Date: 10/21/2022    My doctor: Donis Quiroga   My clinic: [facility]  [address] [facility]  [place] [id number] [phone]          GREEN    ZONE   Good Control    What it looks like:     Things are going generally well. You have normal ups and downs. You may even feel depressed from time to time, but bad moods usually last less than a day.   What you need to do:  1. Continue to care for yourself (see self care plan)  2. Check your depression survival kit and update it as needed  3. Follow your physician s recommendations including any medication.  4. Do not stop taking medication unless you consult with your physician first.           YELLOW         ZONE Getting Worse    What it looks like:     Depression is starting to interfere with your life.     It may be hard to get out of bed; you may be starting to isolate yourself from others.    Symptoms of depression are starting to last most all day and this has happened for several days.     You may have suicidal thoughts but they are not constant.   What you need to do:     1. Call your care team. Your response to treatment will improve if you keep your care team informed of your progress. Yellow periods are signs an adjustment may need to be made.     2. Continue your self-care.  Just get dressed and ready for the day.  Don't give yourself time to talk yourself out of it.    3. Talk to someone in your support network.    4. Open up your Depression Self-Care Plan/Wellness Kit.           RED    ZONE Medical Alert - Get Help    What it looks like:     Depression is seriously interfering with your life.     You may experience these or other symptoms: You can t get out of bed most days, can t work or engage in other necessary activities, you have trouble taking care of basic hygiene, or basic responsibilities, thoughts of suicide or death that will not  go away, self-injurious behavior.     What you need to do:  1. Call your care team and request a same-day appointment. If they are not available (weekends or after hours) call your local crisis line, emergency room or 911.          Depression Self-Care Plan / Wellness Kit    Many people find that medication and therapy are helpful treatments for managing depression. In addition, making small changes to your everyday life can help to boost your mood and improve your wellbeing. Below are some tips for you to consider. Be sure to talk with your medical provider and/or behavioral health consultant if your symptoms are worsening or not improving.     Sleep   Sleep hygiene  means all of the habits that support good, restful sleep. It includes maintaining a consistent bedtime and wake time, using your bedroom only for sleeping or sex, and keeping the bedroom dark and free of distractions like a computer, smartphone, or television.     Develop a Healthy Routine  Maintain good hygiene. Get out of bed in the morning, make your bed, brush your teeth, take a shower, and get dressed. Don t spend too much time viewing media that makes you feel stressed. Find time to relax each day.    Exercise  Get some form of exercise every day. This will help reduce pain and release endorphins, the  feel good  chemicals in your brain. It can be as simple as just going for a walk or doing some gardening, anything that will get you moving.      Diet  Strive to eat healthy foods, including fruits and vegetables. Drink plenty of water. Avoid excessive sugar, caffeine, alcohol, and other mood-altering substances.     Stay Connected with Others  Stay in touch with friends and family members.    Manage Your Mood  Try deep breathing, massage therapy, biofeedback, or meditation. Take part in fun activities when you can. Try to find something to smile about each day.     Psychotherapy  Be open to working with a therapist if your provider recommends it.      Medication  Be sure to take your medication as prescribed. Most anti-depressants need to be taken every day. It usually takes several weeks for medications to work. Not all medicines work for all people. It is important to follow-up with your provider to make sure you have a treatment plan that is working for you. Do not stop your medication abruptly without first discussing it with your provider.    Crisis Resources   These hotlines are for both adults and children. They and are open 24 hours a day, 7 days a week unless noted otherwise.      National Suicide Prevention Lifeline   988 or 7-397-002-MLYU (2188)      Crisis Text Line    www.crisistextline.org  Text HOME to 617992 from anywhere in the [place], anytime, about any type of crisis. A live, trained crisis counselor will receive the text and respond quickly.      [place] Lifeline for LGBTQ Youth  A national crisis intervention and suicide lifeline for LGBTQ youth under 25. Provides a safe place to talk without judgement. Call 1-435.838.6333; text START to 105274 or visit www.thetrevorproject.org to talk to a trained counselor.      For Critical access hospital crisis numbers, visit the [facility] website at:  https://[place].gov/dhs/people-we-serve/adults/health-care/mental-health/resources/crisis-contacts.jsp

## 2022-10-21 NOTE — PROGRESS NOTES
"  Assessment & Plan       ICD-10-CM    1. Dysuria  R30.0 UA Macro with Reflex to Micro and Culture - lab collect     UA Macro with Reflex to Micro and Culture - lab collect     Urine Microscopic Exam     Urine Culture      2. Acute cystitis with hematuria  N30.01 sulfamethoxazole-trimethoprim (BACTRIM DS) 800-160 MG tablet      3. Flank pain  R10.9 ibuprofen (ADVIL/MOTRIN) 600 MG tablet          1-3) UA positive for infection, unlikely pyelo. She has not had a fever, no nausea/vomiting, vitals normal. Bactrim DS BID x3 days. UC in process. Supportive therapy also discussed. Follow up if symptoms fail to improve or worsen.        Ordering of each unique test  Prescription drug management         BMI:   Estimated body mass index is 34 kg/m  as calculated from the following:    Height as of 3/22/22: 1.565 m (5' 1.61\").    Weight as of this encounter: 83.3 kg (183 lb 9.6 oz).       Return in about 4 days (around 10/25/2022), or if symptoms worsen or fail to improve.    Dania Bowman PA-C  [facility] Figueroa is a 27 year old, presenting for the following health issues:  UTI      History of Present Illness       Reason for visit:  Back and side  Symptom onset:  1-3 days ago    She eats 0-1 servings of fruits and vegetables daily.She consumes 1 sweetened beverage(s) daily.She exercises with enough effort to increase her heart rate 20 to 29 minutes per day.  She exercises with enough effort to increase her heart rate 3 or less days per week. She is missing 2 dose(s) of medications per week.    Today's PHQ-9         PHQ-9 Total Score: 6    PHQ-9 Q9 Thoughts of better off dead/self-harm past 2 weeks :   Not at all    How difficult have these problems made it for you to do your work, take care of things at home, or get along with other people: Somewhat difficult       Genitourinary - Female  Onset/Duration: 2 days   Description:   Painful urination (Dysuria): No           Frequency: " YES  Blood in urine (Hematuria): No  Delay in urine (Hesitency): No  Intensity: 9/10  Progression of Symptoms:  same  Accompanying Signs & Symptoms:  Fever/chills: No  Flank pain: YES  Nausea and vomiting: No  Vaginal symptoms: itching  Abdominal/Pelvic Pain: YES  History:   History of frequent UTI s: No  History of kidney stones: No  Sexually Active: YES  Possibility of pregnancy: No  Precipitating or alleviating factors: None  Therapies tried and outcome: OTC advil or tylenol     Urethral pressure, urine cloudy  Last UTI a few years ago  Sharp pains in the right flank - ibu has helped with pain  No muscle tenderness  No pain with torso movements       Review of Systems   Constitutional, gi and gu systems are negative, except as otherwise noted.      Objective    /78 (BP Location: Left arm, Patient Position: Sitting, Cuff Size: Adult Regular)   Pulse 89   Temp 97.8  F (36.6  C) (Tympanic)   Resp 20   Wt 83.3 kg (183 lb 9.6 oz)   LMP 09/13/2022 (Approximate)   SpO2 96%   Breastfeeding No   BMI 34.00 kg/m    Body mass index is 34 kg/m .  Physical Exam   GENERAL: healthy, alert and no distress  ABDOMEN: slight right sided CVA tenderness, no abdominal tenderness  MS: no gross musculoskeletal defects noted, no edema  SKIN: no suspicious lesions or rashes  NEURO: Normal strength and tone, mentation intact and speech normal  PSYCH: mentation appears normal, affect normal/bright    Results for orders placed or performed in visit on 10/21/22 (from the past 24 hour(s))   UA Macro with Reflex to Micro and Culture - lab collect    Specimen: Urine, Clean Catch   Result Value Ref Range    Color Urine Yellow Colorless, Straw, Light Yellow, Yellow    Appearance Urine Slightly Cloudy (A) Clear    Glucose Urine Negative Negative, 1000 , >=2000 mg/dL    Bilirubin Urine Negative Negative    Ketones Urine Negative Negative, 160  mg/dL    Specific Gravity Urine >=1.030 1.003 - 1.035    Blood Urine Large (A) Negative    pH  Urine 6.0 5.0 - 7.0    Protein Albumin Urine >=300 (A) Negative, 300 , >=2000 mg/dL    Urobilinogen Urine 0.2 0.2, 1.0 E.U./dL    Nitrite Urine Positive (A) Negative    Leukocyte Esterase Urine Small (A) Negative   Urine Microscopic Exam   Result Value Ref Range    Bacteria Urine Moderate (A) None Seen /HPF    RBC Urine 10-25 (A) 0-2 /HPF /HPF    WBC Urine  (A) 0-5 /HPF /HPF    Squamous Epithelials Urine Moderate (A) None Seen /LPF

## 2022-10-22 LAB — BACTERIA UR CULT: ABNORMAL

## 2022-11-18 DIAGNOSIS — K21.9 GASTROESOPHAGEAL REFLUX DISEASE WITHOUT ESOPHAGITIS: ICD-10-CM

## 2022-11-19 RX ORDER — FAMOTIDINE 20 MG/1
20 TABLET, FILM COATED ORAL DAILY PRN
Qty: 90 TABLET | Refills: 0 | Status: SHIPPED | OUTPATIENT
Start: 2022-11-19 | End: 2023-04-03

## 2022-11-20 NOTE — TELEPHONE ENCOUNTER
"Last Written Prescription Date:  3/24/22  Last Fill Quantity: 90,  # refills: 0   Last office visit provider:  3/22/22     Requested Prescriptions   Pending Prescriptions Disp Refills     famotidine (PEPCID) 20 MG tablet [Pharmacy Med Name: Famotidine Oral Tablet 20 MG] 90 tablet 0     Sig: Take 1 tablet (20 mg) by mouth daily as needed (acid reflux)       H2 Blockers Protocol Passed - 11/18/2022 10:45 AM        Passed - Patient is age 12 or older        Passed - Recent (12 mo) or future (30 days) visit within the authorizing provider's specialty     Patient has had an office visit with the authorizing provider or a provider within the authorizing providers department within the previous 12 mos or has a future within next 30 days. See \"Patient Info\" tab in inbasket, or \"Choose Columns\" in Meds & Orders section of the refill encounter.              Passed - Medication is active on med list             Carie Bray RN 11/19/22 6:51 PM  "

## 2023-01-07 ENCOUNTER — HEALTH MAINTENANCE LETTER (OUTPATIENT)
Age: 28
End: 2023-01-07

## 2023-01-25 ENCOUNTER — LAB (OUTPATIENT)
Dept: LAB | Facility: CLINIC | Age: 28
End: 2023-01-25
Payer: COMMERCIAL

## 2023-01-25 ENCOUNTER — OFFICE VISIT (OUTPATIENT)
Dept: ENDOCRINOLOGY | Facility: CLINIC | Age: 28
End: 2023-01-25
Attending: NURSE PRACTITIONER
Payer: COMMERCIAL

## 2023-01-25 VITALS
HEART RATE: 88 BPM | WEIGHT: 196.6 LBS | BODY MASS INDEX: 36.41 KG/M2 | SYSTOLIC BLOOD PRESSURE: 116 MMHG | DIASTOLIC BLOOD PRESSURE: 76 MMHG

## 2023-01-25 DIAGNOSIS — E66.01 MORBID OBESITY (H): ICD-10-CM

## 2023-01-25 DIAGNOSIS — E28.2 POLYCYSTIC OVARY SYNDROME: ICD-10-CM

## 2023-01-25 DIAGNOSIS — N91.2 AMENORRHEA: ICD-10-CM

## 2023-01-25 DIAGNOSIS — N91.2 AMENORRHEA: Primary | ICD-10-CM

## 2023-01-25 LAB
ALBUMIN SERPL BCG-MCNC: 4.2 G/DL (ref 3.5–5.2)
ALP SERPL-CCNC: 45 U/L (ref 35–104)
ALT SERPL W P-5'-P-CCNC: 14 U/L (ref 10–35)
ANION GAP SERPL CALCULATED.3IONS-SCNC: 12 MMOL/L (ref 7–15)
AST SERPL W P-5'-P-CCNC: 26 U/L (ref 10–35)
BILIRUB SERPL-MCNC: 0.2 MG/DL
BUN SERPL-MCNC: 13.1 MG/DL (ref 6–20)
CALCIUM SERPL-MCNC: 9.3 MG/DL (ref 8.6–10)
CHLORIDE SERPL-SCNC: 102 MMOL/L (ref 98–107)
CREAT SERPL-MCNC: 0.76 MG/DL (ref 0.51–0.95)
DEPRECATED HCO3 PLAS-SCNC: 25 MMOL/L (ref 22–29)
ESTRADIOL SERPL-MCNC: 49 PG/ML
FSH SERPL IRP2-ACNC: 6.8 MIU/ML
GFR SERPL CREATININE-BSD FRML MDRD: >90 ML/MIN/1.73M2
GLUCOSE SERPL-MCNC: 85 MG/DL (ref 70–99)
HCG INTACT+B SERPL-ACNC: <1 MIU/ML
LH SERPL-ACNC: 16.1 MIU/ML
POTASSIUM SERPL-SCNC: 3.9 MMOL/L (ref 3.4–5.3)
PROLACTIN SERPL 3RD IS-MCNC: 11 NG/ML (ref 5–23)
PROT SERPL-MCNC: 7.3 G/DL (ref 6.4–8.3)
SODIUM SERPL-SCNC: 139 MMOL/L (ref 136–145)
T4 FREE SERPL-MCNC: 1.16 NG/DL (ref 0.9–1.7)
TSH SERPL DL<=0.005 MIU/L-ACNC: 1.93 UIU/ML (ref 0.3–4.2)

## 2023-01-25 PROCEDURE — 80053 COMPREHEN METABOLIC PANEL: CPT

## 2023-01-25 PROCEDURE — 83001 ASSAY OF GONADOTROPIN (FSH): CPT

## 2023-01-25 PROCEDURE — 99205 OFFICE O/P NEW HI 60 MIN: CPT | Performed by: INTERNAL MEDICINE

## 2023-01-25 PROCEDURE — 84403 ASSAY OF TOTAL TESTOSTERONE: CPT

## 2023-01-25 PROCEDURE — 84702 CHORIONIC GONADOTROPIN TEST: CPT

## 2023-01-25 PROCEDURE — 82670 ASSAY OF TOTAL ESTRADIOL: CPT

## 2023-01-25 PROCEDURE — 84146 ASSAY OF PROLACTIN: CPT

## 2023-01-25 PROCEDURE — 84305 ASSAY OF SOMATOMEDIN: CPT

## 2023-01-25 PROCEDURE — 83498 ASY HYDROXYPROGESTERONE 17-D: CPT

## 2023-01-25 PROCEDURE — 84439 ASSAY OF FREE THYROXINE: CPT

## 2023-01-25 PROCEDURE — 82627 DEHYDROEPIANDROSTERONE: CPT

## 2023-01-25 PROCEDURE — 84443 ASSAY THYROID STIM HORMONE: CPT

## 2023-01-25 PROCEDURE — 36415 COLL VENOUS BLD VENIPUNCTURE: CPT

## 2023-01-25 PROCEDURE — 83002 ASSAY OF GONADOTROPIN (LH): CPT

## 2023-01-25 RX ORDER — DIPHENHYDRAMINE HCL 25 MG
50 CAPSULE ORAL
COMMUNITY
End: 2023-09-18

## 2023-01-25 RX ORDER — DEXAMETHASONE 1 MG
TABLET ORAL
Qty: 1 TABLET | Refills: 0 | Status: SHIPPED | OUTPATIENT
Start: 2023-01-25 | End: 2023-04-03

## 2023-01-25 RX ORDER — CHOLECALCIFEROL (VITAMIN D3) 50 MCG
1 TABLET ORAL DAILY
COMMUNITY
End: 2023-05-18

## 2023-01-25 NOTE — LETTER
2023         RE: Sakshi White  [address]  [place] [address]        Dear Colleague,    Thank you for referring your patient, Sakshi White, to the [facility]. Please see a copy of my visit note below.      ENDOCRINOLOGY NEW PATIENT VISIT        HISTORY OF PRESENT ILLNESS    Sakshi White is seen in consultation at the request of Natalie Cerda NP for PCOS.    Patient is accompanied by her partner to this visit.    The patient was seen at [facility] for evaluation of infertility.    She had her prior diagnosis of PCOS around age 18.  She recalls menarche was at age 11.  After onset of menarche, the patient recalls amenorrhea.  She was therefore prescribed OCP.  She had regular menstrual cycles while on OCP, although she took inconsistently.    She notes that she has been trying to conceive since at least  and has not been on OCP since then.  Despite this, she has had amenorrhea.  She has been prescribed Provera in gynecology and takes this periodically: Endorses withdrawal bleeding with Provera.      She recalls laparoscopy in  for unknown condition related to fallopian tubes.  She was treated with letrozole without successful conception.    She has noted Hirsutism since around age 16.  Hair growth is primarily on the face and abdomen.  She removes hair by waxing every week.    No purple striae.  No easy bruising.    She has history of prediabetes: Was prescribed metformin (along with spironolactone) in the past.  Was not able to tolerate metformin since it caused worsening of reflux symptoms.    Plan in OB/GYN was to perform labs and pelvic ultrasound: If work-up was normal, plan was to initiate letrozole and, if not successful within 6 cycles, refer to reproductive endocrinology.  Testing (in 2022) revealed mildly elevated prolactin and hemoglobin A1c persisting in prediabetes range, as detailed below: This prompted referral to  endocrinology.    Ms. White has not noted galactorrhea.  Has had intermittent headaches since her teenage years.  No visual changes or visual field cuts.    We reviewed her medication list: Does not take over-the-counter supplements (no hair skin and nail supplements or biotin).    Family history is notable for father and multiple paternal relatives with diabetes.  No family history of PCOS.  Her sister has history of irregular menstrual cycles.    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    PAST MEDICAL HISTORY  Past Medical History:   Diagnosis Date     Depression     talk therapy     PCOS (polycystic ovarian syndrome)      Secondary amenorrhea      Vaginitis      Varicella     as child       MEDICATIONS  Current Outpatient Medications   Medication Sig Dispense Refill     famotidine (PEPCID) 20 MG tablet Take 1 tablet (20 mg) by mouth daily as needed (acid reflux) 90 tablet 0     ibuprofen (ADVIL/MOTRIN) 200 MG tablet Take 800 mg by mouth every 6 hours as needed for pain       ibuprofen (ADVIL/MOTRIN) 600 MG tablet Take 1 tablet (600 mg) by mouth every 6 hours as needed for moderate pain 60 tablet 0     medroxyPROGESTERone (PROVERA) 10 MG tablet every 24 hours       metFORMIN (GLUCOPHAGE) 500 MG tablet Take 500 mg by mouth 2 times daily (with meals)       omeprazole (PRILOSEC) 20 MG DR capsule Take 1 capsule (20 mg) by mouth daily 90 capsule 3     SF 5000 PLUS 1.1 % Crea [SF 5000 PLUS 1.1 % CREA] Sodium fluoride toothpaste         Allergies, family, and social history were reviewed and documented as needed in EHR.     REVIEW OF SYSTEMS  A complete 10-point ROS was performed and pertinent positives and negatives are noted in the HPI.     PHYSICAL EXAM  /76 (BP Location: Right arm, Patient Position: Sitting, Cuff Size: Adult Large)   Pulse 88   Wt 89.2 kg (196 lb 9.6 oz)   BMI 36.41 kg/m    Body mass index is 36.41 kg/m .  Constitutional: Vital  signs reviewed, as recorded above. Patient is alert, oriented and appears in no acute distress.  Eyes: PER, EOMI, no stare, lid lag, or retraction; no conjunctival injection.  Neck: Neck supple, somewhat full but no palpable thyromegaly.  Lymphatic: No cervical or supraclavicular LAD.  Cardiovascular: RRR, normal S1/S2, no audible murmurs, rubs or gallops; no LE edema.  Respiratory: CTAB, without wheezes, crackles or rhonchi; normal chest wall motion and respiratory effort.  GI: Positive bowel sounds, soft, NT/ND.  MSK: No clubbing or cyanosis; normal muscle bulk and tone.  Skin: Terminal hair growth on face/chin.  Otherwise normal skin color, temperature, turgor and texture, no purple striae.  Neurological: Alert and oriented times 3. No tremor.    DATA REVIEW  Each of the following laboratory and/or imaging studies were reviewed.    Component      Latest Ref Rng & Units 8/16/2022   Estradiol      pg/mL 20   Prolactin      5 - 23 ng/mL 27 (H)   Hemoglobin A1C      <5.7 % 6.0 (H)   FSH      mIU/mL 6.0   Luteinizing Hormone      mIU/mL 13.6       ASSESSMENT  1.  History of irregular menstrual cycles/amenorrhea.  Prior diagnosis of PCOS, new finding of hyperprolactinemia (as discussed below).  I would also screen for other endocrinopathies which could have a similar manifestation, especially in light of prediabetes and hirsutism.    2.  Hyperprolactinemia.  Mild.  Would recheck to confirm its persistence.  Not on classic medications which are associated with hyperprolactinemia.  No known history of thyroid or kidney disease.  Would rule out pregnancy and screen for coexisting thyroid and kidney disease.  If hyperprolactinemia persists and no cause has been identified, would proceed with pituitary MRI.  Since she has had history of infertility and amenorrhea, she would be a candidate for trial of therapy if indeed hyperprolactinemia persists even if mild.  We had an extensive discussion on pathophysiology of  hyperprolactinemia, potential causes as well as proposed testing.    3.  High risk for diabetes.  With hemoglobin A1c in prediabetes range.  Was not able to tolerate metformin.  Could consider a trial of low-dose extended release formulation in the future.    4.  Infertility.  Following in gynecology.  Evaluate for endocrine causes as above.  If work-up is unremarkable, anticipate follow-up in gynecology to explore available treatment options.    PLAN  -Labs today  -After labs today, move forward with dexamethasone suppression test (written and verbal instructions for dexamethasone suppression test were given to patient)  -We will decide on next steps based on lab results  -Return for a follow-up visit earliest available  -We will communicate results via [employer], or if needed by phone      Orders Placed This Encounter   Procedures     Testosterone total     HCG Quantitative Pregnancy, Blood (LHY000)     Prolactin     Insulin Growth Factor 1 by Immunoassay     DHEA sulfate     17 OH progesterone     Comprehensive metabolic panel     TSH     T4 free     Luteinizing Hormone     Follicle stimulating hormone     Estradiol         I spent a total of 62 minutes on the date of encounter reviewing medical records, evaluating the patient, coordinating care and documenting in the EHR, as detailed above.      Maylin Sotelo MD   Division of Diabetes, Endocrinology and Metabolism  Department of Medicine      cc: Natalie Cerda, CORNELIO; Donis Quiroga,                Again, thank you for allowing me to participate in the care of your patient.        Sincerely,        MAYLIN Sotelo MD

## 2023-01-25 NOTE — PROGRESS NOTES
ENDOCRINOLOGY NEW PATIENT VISIT        HISTORY OF PRESENT ILLNESS    Sakshi White is seen in consultation at the request of Natalie Cerda NP for PCOS.    Patient is accompanied by her partner to this visit.    The patient was seen at [facility] for evaluation of infertility.    She had her prior diagnosis of PCOS around age 18.  She recalls menarche was at age 11.  After onset of menarche, the patient recalls amenorrhea.  She was therefore prescribed OCP.  She had regular menstrual cycles while on OCP, although she took inconsistently.    She notes that she has been trying to conceive since at least  and has not been on OCP since then.  Despite this, she has had amenorrhea.  She has been prescribed Provera in gynecology and takes this periodically: Endorses withdrawal bleeding with Provera.      She recalls laparoscopy in  for unknown condition related to fallopian tubes.  She was treated with letrozole without successful conception.    She has noted Hirsutism since around age 16.  Hair growth is primarily on the face and abdomen.  She removes hair by waxing every week.    No purple striae.  No easy bruising.    She has history of prediabetes: Was prescribed metformin (along with spironolactone) in the past.  Was not able to tolerate metformin since it caused worsening of reflux symptoms.    Plan in OB/GYN was to perform labs and pelvic ultrasound: If work-up was normal, plan was to initiate letrozole and, if not successful within 6 cycles, refer to reproductive endocrinology.  Testing (in 2022) revealed mildly elevated prolactin and hemoglobin A1c persisting in prediabetes range, as detailed below: This prompted referral to endocrinology.    Ms. White has not noted galactorrhea.  Has had intermittent headaches since her teenage years.  No visual changes or visual field cuts.    We reviewed her medication list: Does not take over-the-counter supplements (no hair skin and nail supplements  or biotin).    Family history is notable for father and multiple paternal relatives with diabetes.  No family history of PCOS.  Her sister has history of irregular menstrual cycles.    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    PAST MEDICAL HISTORY  Past Medical History:   Diagnosis Date     Depression     talk therapy     PCOS (polycystic ovarian syndrome)      Secondary amenorrhea      Vaginitis      Varicella     as child       MEDICATIONS  Current Outpatient Medications   Medication Sig Dispense Refill     famotidine (PEPCID) 20 MG tablet Take 1 tablet (20 mg) by mouth daily as needed (acid reflux) 90 tablet 0     ibuprofen (ADVIL/MOTRIN) 200 MG tablet Take 800 mg by mouth every 6 hours as needed for pain       ibuprofen (ADVIL/MOTRIN) 600 MG tablet Take 1 tablet (600 mg) by mouth every 6 hours as needed for moderate pain 60 tablet 0     medroxyPROGESTERone (PROVERA) 10 MG tablet every 24 hours       metFORMIN (GLUCOPHAGE) 500 MG tablet Take 500 mg by mouth 2 times daily (with meals)       omeprazole (PRILOSEC) 20 MG DR capsule Take 1 capsule (20 mg) by mouth daily 90 capsule 3     SF 5000 PLUS 1.1 % Crea [SF 5000 PLUS 1.1 % CREA] Sodium fluoride toothpaste         Allergies, family, and social history were reviewed and documented as needed in EHR.     REVIEW OF SYSTEMS  A complete 10-point ROS was performed and pertinent positives and negatives are noted in the HPI.     PHYSICAL EXAM  /76 (BP Location: Right arm, Patient Position: Sitting, Cuff Size: Adult Large)   Pulse 88   Wt 89.2 kg (196 lb 9.6 oz)   BMI 36.41 kg/m    Body mass index is 36.41 kg/m .  Constitutional: Vital signs reviewed, as recorded above. Patient is alert, oriented and appears in no acute distress.  Eyes: PER, EOMI, no stare, lid lag, or retraction; no conjunctival injection.  Neck: Neck supple, somewhat full but no palpable thyromegaly.  Lymphatic: No cervical or  supraclavicular LAD.  Cardiovascular: RRR, normal S1/S2, no audible murmurs, rubs or gallops; no LE edema.  Respiratory: CTAB, without wheezes, crackles or rhonchi; normal chest wall motion and respiratory effort.  GI: Positive bowel sounds, soft, NT/ND.  MSK: No clubbing or cyanosis; normal muscle bulk and tone.  Skin: Terminal hair growth on face/chin.  Otherwise normal skin color, temperature, turgor and texture, no purple striae.  Neurological: Alert and oriented times 3. No tremor.    DATA REVIEW  Each of the following laboratory and/or imaging studies were reviewed.    Component      Latest Ref Rng & Units 8/16/2022   Estradiol      pg/mL 20   Prolactin      5 - 23 ng/mL 27 (H)   Hemoglobin A1C      <5.7 % 6.0 (H)   FSH      mIU/mL 6.0   Luteinizing Hormone      mIU/mL 13.6       ASSESSMENT  1.  History of irregular menstrual cycles/amenorrhea.  Prior diagnosis of PCOS, new finding of hyperprolactinemia (as discussed below).  I would also screen for other endocrinopathies which could have a similar manifestation, especially in light of prediabetes and hirsutism.    2.  Hyperprolactinemia.  Mild.  Would recheck to confirm its persistence.  Not on classic medications which are associated with hyperprolactinemia.  No known history of thyroid or kidney disease.  Would rule out pregnancy and screen for coexisting thyroid and kidney disease.  If hyperprolactinemia persists and no cause has been identified, would proceed with pituitary MRI.  Since she has had history of infertility and amenorrhea, she would be a candidate for trial of therapy if indeed hyperprolactinemia persists even if mild.  We had an extensive discussion on pathophysiology of hyperprolactinemia, potential causes as well as proposed testing.    3.  High risk for diabetes.  With hemoglobin A1c in prediabetes range.  Was not able to tolerate metformin.  Could consider a trial of low-dose extended release formulation in the future.    4.   Infertility.  Following in gynecology.  Evaluate for endocrine causes as above.  If work-up is unremarkable, anticipate follow-up in gynecology to explore available treatment options.    PLAN  -Labs today  -After labs today, move forward with dexamethasone suppression test (written and verbal instructions for dexamethasone suppression test were given to patient)  -We will decide on next steps based on lab results  -Return for a follow-up visit earliest available  -We will communicate results via MyChart, or if needed by phone      Orders Placed This Encounter   Procedures     Testosterone total     HCG Quantitative Pregnancy, Blood ([id number])     Prolactin     Insulin Growth Factor 1 by Immunoassay     DHEA sulfate     17 OH progesterone     Comprehensive metabolic panel     TSH     T4 free     Luteinizing Hormone     Follicle stimulating hormone     Estradiol         I spent a total of 62 minutes on the date of encounter reviewing medical records, evaluating the patient, coordinating care and documenting in the EHR, as detailed above.      Maura Sotelo MD   Division of Diabetes, Endocrinology and Metabolism  Department of Medicine      cc: Natalie Cerda NP; Donis Quiroga DO

## 2023-01-25 NOTE — PATIENT INSTRUCTIONS
-Labs today  -After labs today, move forward with dexamethasone suppression test  -Perform the dexamethasone suppression test as follows:  1. Take 1 mg dexamethasone tablet at 11 PM.  2. The next morning, go to lab to have blood draw at 8 AM. You should arrive in lab by 7:45 AM to be sure your blood is drawn by 8 AM.  The prescription for dexamethasone has been sent to pharmacy.  -We will decide on next steps based on lab results  -Return for a follow-up visit earliest available  -We will communicate results via [employer], or if needed by phone  
no hearing difficulty

## 2023-01-26 LAB — DHEA-S SERPL-MCNC: 161 UG/DL (ref 35–430)

## 2023-01-27 LAB
IGF-I BLD-MCNC: 145 NG/ML (ref 91–293)
TESTOST SERPL-MCNC: 41 NG/DL (ref 8–60)

## 2023-02-02 LAB — 17OHP SERPL-MCNC: 48 NG/DL

## 2023-02-09 ENCOUNTER — LAB (OUTPATIENT)
Dept: LAB | Facility: CLINIC | Age: 28
End: 2023-02-09
Payer: COMMERCIAL

## 2023-02-09 DIAGNOSIS — E28.2 POLYCYSTIC OVARY SYNDROME: ICD-10-CM

## 2023-02-09 DIAGNOSIS — E66.01 MORBID OBESITY (H): ICD-10-CM

## 2023-02-09 DIAGNOSIS — N91.2 AMENORRHEA: ICD-10-CM

## 2023-02-09 LAB
CORTIS SERPL-MCNC: 0.5 UG/DL
Lab: NORMAL
PERFORMING LABORATORY: NORMAL
SPECIMEN STATUS: NORMAL
TEST NAME: NORMAL

## 2023-02-09 PROCEDURE — 80299 QUANTITATIVE ASSAY DRUG: CPT

## 2023-02-09 PROCEDURE — 36415 COLL VENOUS BLD VENIPUNCTURE: CPT

## 2023-02-09 PROCEDURE — 82533 TOTAL CORTISOL: CPT

## 2023-02-15 LAB — MISCELLANEOUS TEST 1 (ARUP): NORMAL

## 2023-02-16 ENCOUNTER — E-VISIT (OUTPATIENT)
Dept: URGENT CARE | Facility: CLINIC | Age: 28
End: 2023-02-16
Payer: COMMERCIAL

## 2023-02-16 ENCOUNTER — MYC MEDICAL ADVICE (OUTPATIENT)
Dept: ENDOCRINOLOGY | Facility: CLINIC | Age: 28
End: 2023-02-16

## 2023-02-16 DIAGNOSIS — E66.01 MORBID OBESITY (H): ICD-10-CM

## 2023-02-16 DIAGNOSIS — E28.2 POLYCYSTIC OVARY SYNDROME: Primary | ICD-10-CM

## 2023-02-16 DIAGNOSIS — N91.2 AMENORRHEA: ICD-10-CM

## 2023-02-16 DIAGNOSIS — N93.9 VAGINAL BLEEDING: Primary | ICD-10-CM

## 2023-02-16 PROCEDURE — 99207 PR NON-BILLABLE SERV PER CHARTING: CPT | Performed by: PHYSICIAN ASSISTANT

## 2023-02-16 NOTE — PATIENT INSTRUCTIONS
Thank you for choosing us for your care. I think an in-clinic visit would be best next steps based on your symptoms. Please schedule a clinic appointment; you won t be charged for this eVisit.      You can schedule an appointment right here in [facility], or call [phone]

## 2023-02-28 ENCOUNTER — E-VISIT (OUTPATIENT)
Dept: URGENT CARE | Facility: CLINIC | Age: 28
End: 2023-02-28
Payer: COMMERCIAL

## 2023-02-28 DIAGNOSIS — B37.31 CANDIDAL VULVOVAGINITIS: Primary | ICD-10-CM

## 2023-02-28 PROCEDURE — 99421 OL DIG E/M SVC 5-10 MIN: CPT | Performed by: NURSE PRACTITIONER

## 2023-02-28 RX ORDER — FLUCONAZOLE 150 MG/1
150 TABLET ORAL ONCE
Qty: 1 TABLET | Refills: 0 | Status: SHIPPED | OUTPATIENT
Start: 2023-02-28 | End: 2023-02-28

## 2023-02-28 NOTE — PATIENT INSTRUCTIONS
Thank you for choosing us for your care. I have placed an order for a prescription so that you can start treatment. View your full visit summary for details by clicking on the link below. Your pharmacist will able to address any questions you may have about the medication.     If you re not feeling better within 2-3 days, please schedule an appointment.  You can schedule an appointment right here in [facility], or call [phone]  If the visit is for the same symptoms as your eVisit, we ll refund the cost of your eVisit if seen within seven days.      Yeast Infection (Candida Vaginal Infection)    You have a Candida vaginal infection. This is also known as a yeast infection. It's most often caused by a type of yeast (fungus) called Candida. Candida are normally found in the vagina. But if they increase in number, this can lead to infection and cause symptoms.   Symptoms of a yeast infection can include:     Clumpy or thin, white discharge, which may look like cottage cheese    Itching or burning    Burning with urination  Certain factors can make a yeast infection more likely. These can include:     Taking certain medicines, such as antibiotics or birth control pills    Pregnancy    Diabetes    Weak immune system  A yeast infection is most often treated with antifungal medicine. This may be given as a vaginal cream or pills you take by mouth. Treatment may last for about 1 to 7 days. Women with severe or recurrent infections may need longer courses of treatment.   Home care    If you re prescribed medicine, be sure to use it as directed. Finish all of the medicine, even if your symptoms go away. Don t try to treat yourself using over-the-counter products without talking with your provider first. They will let you know if this is a good option for you.    Ask your provider what steps you can take to help reduce your risk of having a yeast infection in the future.    Follow-up care  Follow up with your healthcare  provider, or as directed.   When to seek medical advice  Call your healthcare provider right away if:     You have a fever of 100.4 F (38 C) or higher, or as directed by your provider.    Your symptoms worsen, or they don t go away within a few days of starting treatment.    You have new pain in the lower belly or pelvic region.    You have side effects that bother you or a reaction to the cream or pills you re prescribed.    You or any partners you have sex with have new symptoms, such as a rash, joint pain, or sores.  [employer] last reviewed this educational content on 7/1/2020 2000-2021 [employer]. All rights reserved. This information is not intended as a substitute for professional medical care. Always follow your healthcare professional's instructions.

## 2023-03-04 ENCOUNTER — NURSE TRIAGE (OUTPATIENT)
Dept: NURSING | Facility: CLINIC | Age: 28
End: 2023-03-04
Payer: COMMERCIAL

## 2023-03-04 NOTE — TELEPHONE ENCOUNTER
Vaginal yeast symptoms just finished treatment.    The discharge began a week ago    It feels like there should be has not looked    There is burning in the area    No abdominal pain    Denies any fever    There is itching    No vaginal bleeding    No pain with urination    Denies pregnancy    Per protocol patient should be seen    Home cares given per protocol    Yue Torres RN  [place] Nurse Advisor  8:22 AM  3/4/2023      Reason for Disposition    Bad smelling vaginal discharge    Additional Information    Negative: [1] SEVERE abdominal pain (e.g., excruciating) AND [2] present > 1 hour    Negative: Patient sounds very sick or weak to the triager    Negative: [1] Yellow or green vaginal discharge AND [2] fever    Negative: [1] Genital area looks infected (e.g., draining sore, spreading redness) AND [2] fever    Negative: [1] Constant abdominal pain AND [2] present > 2 hours    Negative: [1] Mild lower abdominal pain comes and goes (cramps) AND [2] lasts > 24 hours    Negative: Genital area looks infected (e.g., draining sore, spreading redness)    Negative: [1] Rash is tiny water blisters AND [2] 3 or more    Negative: [1] Rash (e.g., redness, tiny bumps, sore) of genital area AND [2] present > 24 hours    Protocols used: VAGINAL [id number]

## 2023-03-07 ENCOUNTER — LAB (OUTPATIENT)
Dept: LAB | Facility: CLINIC | Age: 28
End: 2023-03-07
Payer: COMMERCIAL

## 2023-03-07 DIAGNOSIS — N91.2 AMENORRHEA: ICD-10-CM

## 2023-03-07 DIAGNOSIS — E28.2 POLYCYSTIC OVARY SYNDROME: ICD-10-CM

## 2023-03-07 DIAGNOSIS — E66.01 MORBID OBESITY (H): ICD-10-CM

## 2023-03-10 LAB
COLLECT DURATION TIME UR: 24 H
CREAT 24H UR-MRATE: 0.6 G/SPEC (ref 0.8–1.8)
CREAT UR-MCNC: 109 MG/DL
SPECIMEN VOL UR: 550 ML

## 2023-03-10 PROCEDURE — 82570 ASSAY OF URINE CREATININE: CPT

## 2023-03-10 PROCEDURE — 99000 SPECIMEN HANDLING OFFICE-LAB: CPT

## 2023-03-10 PROCEDURE — 81050 URINALYSIS VOLUME MEASURE: CPT

## 2023-03-10 PROCEDURE — 82530 CORTISOL FREE: CPT | Mod: 90

## 2023-03-15 ENCOUNTER — MYC MEDICAL ADVICE (OUTPATIENT)
Dept: ENDOCRINOLOGY | Facility: CLINIC | Age: 28
End: 2023-03-15

## 2023-03-16 LAB
ANNOTATION COMMENT IMP: ABNORMAL
CORTIS F 24H UR HPLC-MCNC: 14.3 UG/L
CORTIS F 24H UR-MRATE: 7.9 UG/D
CORTIS F/CREAT 24H UR: 13.24 UG/G CRT
CREAT 24H UR-MRATE: 594 MG/D
CREAT UR-MCNC: 108 MG/DL

## 2023-03-27 ASSESSMENT — ENCOUNTER SYMPTOMS
COUGH: 0
JOINT SWELLING: 0
FEVER: 0
HEARTBURN: 1
EYE PAIN: 0
NERVOUS/ANXIOUS: 0
DIZZINESS: 0
FREQUENCY: 0
HEADACHES: 1
BREAST MASS: 0
DIARRHEA: 0
HEMATURIA: 0
ABDOMINAL PAIN: 0
MYALGIAS: 0
SORE THROAT: 0
PARESTHESIAS: 0
PALPITATIONS: 0
DYSURIA: 0
ARTHRALGIAS: 0
CHILLS: 0
NAUSEA: 0
CONSTIPATION: 1
WEAKNESS: 0
SHORTNESS OF BREATH: 0
HEMATOCHEZIA: 0

## 2023-03-27 ASSESSMENT — PATIENT HEALTH QUESTIONNAIRE - PHQ9
SUM OF ALL RESPONSES TO PHQ QUESTIONS 1-9: 16
10. IF YOU CHECKED OFF ANY PROBLEMS, HOW DIFFICULT HAVE THESE PROBLEMS MADE IT FOR YOU TO DO YOUR WORK, TAKE CARE OF THINGS AT HOME, OR GET ALONG WITH OTHER PEOPLE: SOMEWHAT DIFFICULT
SUM OF ALL RESPONSES TO PHQ QUESTIONS 1-9: 16

## 2023-04-03 ENCOUNTER — OFFICE VISIT (OUTPATIENT)
Dept: FAMILY MEDICINE | Facility: CLINIC | Age: 28
End: 2023-04-03
Payer: COMMERCIAL

## 2023-04-03 VITALS
BODY MASS INDEX: 34.73 KG/M2 | RESPIRATION RATE: 18 BRPM | DIASTOLIC BLOOD PRESSURE: 72 MMHG | OXYGEN SATURATION: 100 % | HEART RATE: 94 BPM | TEMPERATURE: 97.6 F | HEIGHT: 63 IN | SYSTOLIC BLOOD PRESSURE: 116 MMHG | WEIGHT: 196 LBS

## 2023-04-03 DIAGNOSIS — M79.645 PAIN OF FINGER OF LEFT HAND: ICD-10-CM

## 2023-04-03 DIAGNOSIS — N93.9 VAGINAL BLEEDING: ICD-10-CM

## 2023-04-03 DIAGNOSIS — G47.9 TROUBLE IN SLEEPING: ICD-10-CM

## 2023-04-03 DIAGNOSIS — F32.2 SEVERE MAJOR DEPRESSION, SINGLE EPISODE (H): ICD-10-CM

## 2023-04-03 DIAGNOSIS — N97.0 PRIMARY ANOVULATORY INFERTILITY: ICD-10-CM

## 2023-04-03 DIAGNOSIS — R73.03 PRE-DIABETES: ICD-10-CM

## 2023-04-03 DIAGNOSIS — Z00.00 ANNUAL PHYSICAL EXAM: Primary | ICD-10-CM

## 2023-04-03 DIAGNOSIS — N89.8 VAGINAL DISCHARGE: ICD-10-CM

## 2023-04-03 DIAGNOSIS — K21.9 GASTROESOPHAGEAL REFLUX DISEASE WITHOUT ESOPHAGITIS: ICD-10-CM

## 2023-04-03 DIAGNOSIS — Z12.4 SCREENING FOR MALIGNANT NEOPLASM OF CERVIX: ICD-10-CM

## 2023-04-03 DIAGNOSIS — E66.9 OBESITY (BMI 30-39.9): ICD-10-CM

## 2023-04-03 DIAGNOSIS — R06.83 SNORING: ICD-10-CM

## 2023-04-03 DIAGNOSIS — Z11.3 SCREEN FOR STD (SEXUALLY TRANSMITTED DISEASE): ICD-10-CM

## 2023-04-03 PROBLEM — E66.01 MORBID OBESITY (H): Status: RESOLVED | Noted: 2023-01-25 | Resolved: 2023-04-03

## 2023-04-03 LAB
CLUE CELLS: ABNORMAL
HBA1C MFR BLD: 5.7 % (ref 0–5.6)
HCG INTACT+B SERPL-ACNC: <1 MIU/ML
TRICHOMONAS, WET PREP: ABNORMAL
WBC'S/HIGH POWER FIELD, WET PREP: ABNORMAL
YEAST, WET PREP: ABNORMAL

## 2023-04-03 PROCEDURE — 87389 HIV-1 AG W/HIV-1&-2 AB AG IA: CPT | Performed by: PHYSICIAN ASSISTANT

## 2023-04-03 PROCEDURE — 87591 N.GONORRHOEAE DNA AMP PROB: CPT | Performed by: PHYSICIAN ASSISTANT

## 2023-04-03 PROCEDURE — 87491 CHLMYD TRACH DNA AMP PROBE: CPT | Performed by: PHYSICIAN ASSISTANT

## 2023-04-03 PROCEDURE — 84702 CHORIONIC GONADOTROPIN TEST: CPT | Performed by: PHYSICIAN ASSISTANT

## 2023-04-03 PROCEDURE — 36415 COLL VENOUS BLD VENIPUNCTURE: CPT | Performed by: PHYSICIAN ASSISTANT

## 2023-04-03 PROCEDURE — 86803 HEPATITIS C AB TEST: CPT | Performed by: PHYSICIAN ASSISTANT

## 2023-04-03 PROCEDURE — 99395 PREV VISIT EST AGE 18-39: CPT | Performed by: PHYSICIAN ASSISTANT

## 2023-04-03 PROCEDURE — 86780 TREPONEMA PALLIDUM: CPT | Performed by: PHYSICIAN ASSISTANT

## 2023-04-03 PROCEDURE — 87210 SMEAR WET MOUNT SALINE/INK: CPT | Performed by: PHYSICIAN ASSISTANT

## 2023-04-03 PROCEDURE — 99214 OFFICE O/P EST MOD 30 MIN: CPT | Mod: 25 | Performed by: PHYSICIAN ASSISTANT

## 2023-04-03 PROCEDURE — 83036 HEMOGLOBIN GLYCOSYLATED A1C: CPT | Performed by: PHYSICIAN ASSISTANT

## 2023-04-03 PROCEDURE — G0145 SCR C/V CYTO,THINLAYER,RESCR: HCPCS | Performed by: PHYSICIAN ASSISTANT

## 2023-04-03 RX ORDER — MEDROXYPROGESTERONE ACETATE 10 MG
10 TABLET ORAL DAILY
Qty: 90 TABLET | Refills: 1 | Status: SHIPPED | OUTPATIENT
Start: 2023-04-03 | End: 2024-02-12

## 2023-04-03 RX ORDER — FAMOTIDINE 20 MG/1
20 TABLET, FILM COATED ORAL DAILY PRN
Qty: 90 TABLET | Refills: 1 | Status: SHIPPED | OUTPATIENT
Start: 2023-04-03 | End: 2023-05-18

## 2023-04-03 NOTE — PROGRESS NOTES
SUBJECTIVE    Sakshi White is a 27 year old female who presents for an annual exam.    Other concerns today:  1.  Sleeping problems  Chronic sleep issues.  She normally takes Benadryl nightly.  This works well for her.  She has been on it for years.  She is wondering if she can keep using this, or if something else needs to be done.  She would ideally like to not have to take medicine to sleep.  She does not think she has any racing thoughts.  She has tried good sleep hygiene.    2.  Prediabetes  She is seeing endocrinology for prediabetes, infertility, but primarily for PCOS.  See their notes.    3.  Weight.  She does have obesity.  This is likely contributing to some of her symptoms other symptoms.  She has lost weight with various diets or methods, but then has always gained it back.  She is interested in seeing a specialist about this.    4.  Finger pain  Pain in the index and ring fingers of her left hand.  She says recently those fingers were pretty painful and a bit swollen.  Symptoms seem to have improved a bit.  She says the last time something like this happened, she had a right ganglion cyst in her wrist.  She says previously they were not able to see the cyst until they did a scan.    5.  GERD  Chronic.  She says symptoms are well controlled that she takes omeprazole daily.  However, if she misses 2 or 3 days of medicine, GERD can be quite severe.  At times it has woken her up out of sleep.  She does sometimes get a little bit of vomiting in the back of her throat.  Sometimes GERD is triggered by certain foods or certain other activities, other times she has flareups and cannot figure out why.        Patient Active Problem List    Diagnosis Date Noted     Morbid obesity (H) 01/25/2023     Priority: Medium     Pre-diabetes 08/24/2022     Priority: Medium     Bacterial vaginosis 03/27/2022     Priority: Medium     Irregular menses 03/24/2022     Priority: Medium     Insomnia, unspecified type  06/28/2021     Priority: Medium     Gastroesophageal reflux disease without esophagitis 08/29/2018     Priority: Medium     Migraine without aura and without status migrainosus, not intractable 06/25/2018     Priority: Medium     Primary anovulatory infertility 03/01/2018     Priority: Medium     Clomid, Letrozole and Ovidrel use from 2253-3452, reproductive studies   done at [facility] in 2015 and in 2017 at .    [facility]         Obesity (BMI 30-39.9) 02/24/2018     Priority: Medium     Vitamin D deficiency 02/06/2018     Priority: Medium     PCOS (polycystic ovarian syndrome) 09/28/2016     Priority: Medium     Hirsutism 09/28/2016     Priority: Medium     Severe major depression, single episode (H) 05/13/2014     Priority: Medium        Immunization History   Administered Date(s) Administered     COVID-19 Vaccine 18+ (Moderna) 05/13/2021, 06/17/2021, 02/22/2022     DTAP (<7y) 1993, 1993, 04/10/1994, 07/25/1997, 07/29/1998     FLU 6-35 months 10/28/2014     Flu, Unspecified 12/04/2008, 11/19/2010, 11/28/2014     HIB(PRP-OMP)(PedvaxHIB) 1993, 1993, 02/10/1994     HPV Quadrivalent 12/04/2008, 11/19/2010, 02/16/2012     HepA, Unspecified 12/04/2008     HepB, Unspecified 04/12/1996, 07/15/1996     Hepatits B (Peds <19Y) 07/25/1997     Influenza (IIV3) PF 11/19/2010, 11/28/2014     Influenza Intranasal Vaccine 12/04/2008     MMR 09/12/1994, 07/25/1997     Meningococcal ACWY (Menactra ) 12/04/2008     Poliovirus, inactivated (IPV) 1993, 1993, 07/25/1997, 07/29/1998     TD,PF 7+ (Tenivac) 02/12/2020     TDAP (Adacel,Boostrix) 04/21/2005, 12/04/2008     Td (Adult), Adsorbed 04/21/2005     Varicella 07/25/1997, 12/04/2008       No LMP recorded. (Menstrual status: Irregular Periods).  OB History   No obstetric history on file.       Current Outpatient Medications   Medication     diphenhydrAMINE (BENADRYL) 25 MG capsule     famotidine (PEPCID) 20  MG tablet     ibuprofen (ADVIL/MOTRIN) 200 MG tablet     ibuprofen (ADVIL/MOTRIN) 600 MG tablet     medroxyPROGESTERone (PROVERA) 10 MG tablet     omeprazole (PRILOSEC) 20 MG DR capsule     vitamin D3 (CHOLECALCIFEROL) 50 mcg (2000 units) tablet     dexamethasone (DECADRON) 1 MG tablet     metFORMIN (GLUCOPHAGE) 500 MG tablet     No current facility-administered medications for this visit.       Past Medical History:   Diagnosis Date     Depression     talk therapy     PCOS (polycystic ovarian syndrome)      Secondary amenorrhea      Vaginitis      Varicella     as child       Past Surgical History:   Procedure Laterality Date     PELVIC LAPAROSCOPY  08/19/2015     WRIST GANGLION EXCISION Right 2019        Family History   Problem Relation Age of Onset     Hypertension Mother      Kidney Disease Mother      Depression Mother      Diabetes Father      Hypertension Father      Cancer Maternal Grandmother         unsure type, organ failure, used to be drug addict     Diabetes Paternal Grandmother      Vision Loss Paternal Grandmother      Hypertension Paternal Grandmother      No Known Problems Sister      No Known Problems Brother      No Known Problems Brother      No Known Problems Sister      No Known Problems Sister      No Known Problems Sister             4/3/2023    12:48 PM   Additional Questions   Roomed by darline   Accompanied by self     Patient has been advised of split billing requirements and indicates understanding: Yes  Healthy Habits:     Getting at least 3 servings of Calcium per day:  NO    Bi-annual eye exam:  Yes    Dental care twice a year:  NO    Sleep apnea or symptoms of sleep apnea:  Daytime drowsiness and Excessive snoring    Diet:  Regular (no restrictions)    Frequency of exercise:  1 day/week    Duration of exercise:  45-60 minutes    Taking medications regularly:  Yes    Medication side effects:  None    PHQ-2 Total Score: 3    Additional concerns today:  Yes    Today's PHQ-2 Score:        3/27/2023     9:23 AM   PHQ-2 ( 1999 Pfizer)   Q1: Little interest or pleasure in doing things 2   Q2: Feeling down, depressed or hopeless 1   PHQ-2 Score 3   Q1: Little interest or pleasure in doing things More than half the days   Q2: Feeling down, depressed or hopeless Several days   PHQ-2 Score 3     Social History     Tobacco Use     Smoking status: Never     Smokeless tobacco: Never   Vaping Use     Vaping status: Never Used   Substance Use Topics     Alcohol use: Yes     Comment: occasional         3/27/2023     9:23 AM   Alcohol Use   Prescreen: >3 drinks/day or >7 drinks/week? No         3/22/2022    12:12 PM 3/28/2019     4:12 PM 8/11/2015    10:48 AM   PAP / HPV   PAP Negative for Intraepithelial Lesion or Malignancy (NILM)   Negative for squamous intraepithelial lesion or malignancy  Electronically signed by Roxana Bone CT (ASCP) on 4/1/2019 at 11:20 AM     Negative for squamous intraepithelial lesion or malignancy  Electronically signed by Grace Valle CT (ASCP) on 8/21/2015 at  3:31 PM           Review of Systems   Constitutional: Negative for chills and fever.   HENT: Negative for congestion, ear pain, hearing loss and sore throat.    Eyes: Negative for pain and visual disturbance.   Respiratory: Negative for cough and shortness of breath.    Cardiovascular: Negative for chest pain, palpitations and peripheral edema.   Gastrointestinal: Positive for constipation and heartburn. Negative for abdominal pain, diarrhea, hematochezia and nausea.   Breasts:  Negative for tenderness, breast mass and discharge.   Genitourinary: Negative for dysuria, frequency, genital sores, hematuria, pelvic pain, urgency, vaginal bleeding and vaginal discharge.   Musculoskeletal: Negative for arthralgias, joint swelling and myalgias.   Skin: Negative for rash.   Neurological: Positive for headaches. Negative for dizziness, weakness and paresthesias.   Psychiatric/Behavioral: Negative for mood changes. The  "patient is not nervous/anxious.          OBJECTIVE    Vitals:    04/03/23 1250   BP: 116/72   BP Location: Left arm   Patient Position: Sitting   Cuff Size: Adult Large   Pulse: 94   Resp: 18   Temp: 97.6  F (36.4  C)   TempSrc: Temporal   SpO2: 100%   Weight: 88.9 kg (196 lb)   Height: 1.6 m (5' 3\")       Body mass index is 34.72 kg/m .    Physical Exam:  General: patient is alert and oriented x 3, in no apparent distress  HEENT, Thyroid, Lymphatic, Cardiac, Pulmonary, GI, Musculoskeletal, Skin, and Neuro exams were completed today and grossly normal  Breast exam: Bilateral breasts are healthy and normal.  No masses, skin changes, nipple discharge, or lymphadenopathy noted.  Genitourinary: External genitalia is normal in appearance, vaginal walls are healthy, cervix is well visualized and normal in appearance, no significant discharge noted, pap taken without difficulty    Today's labs pending.      ASSESSMENT and PLAN    1. Annual physical exam  Health maintenance is discussed with patient as appropriate for age and risk factors.  Health maintenance discussed with patient is appropriate for age and risk factors.  STD screening labs ordered today and I will follow-up with results.  She declined COVID booster shot.  - HIV Antigen Antibody Combo; Future  - Treponema Abs w Reflex to RPR and Titer; Future  - Hepatitis C antibody; Future  - HIV Antigen Antibody Combo  - Treponema Abs w Reflex to RPR and Titer  - Hepatitis C antibody    2. Gastroesophageal reflux disease without esophagitis  Chronic, worsening.  Under pretty good control she takes omeprazole daily.  However, if she misses 2 or 3 days he gets really bad.  H. pylori testing ordered today and I will follow-up with results.  She does not want to see GI yet, but may want to in the future.  She does not have an acute abdomen.  - famotidine (PEPCID) 20 MG tablet; Take 1 tablet (20 mg) by mouth daily as needed (acid reflux)  Dispense: 90 tablet; Refill: 1  - " omeprazole (PRILOSEC) 20 MG DR capsule; Take 1 capsule (20 mg) by mouth daily  Dispense: 90 capsule; Refill: 3  - Helicobacter pylori Antigen Stool; Future    3. Severe major depression, single episode (H)  Chronic, stable.  Not taking any medications currently.    4. Pre-diabetes  Chronic, stable.  A1c checked today is improved since last check in August 2022.  Continue to work on healthy eating and exercise.  Referral also made to weight management clinic for prediabetes and obesity.  - Adult Comprehensive Weight Management  Referral; Future  - Hemoglobin A1c    5. Obesity (BMI 30-39.9)  Chronic, stable.  She is working on healthy eating and exercise.  She would like to see weight management to discuss other options.  Referral made today.  - Adult Comprehensive Weight Management  Referral; Future  - Hemoglobin A1c    6. Primary anovulatory infertility  Chronic, stable.  She has Provera that she uses to get her..  She was told by OB/GYN that she should have at least 4 cycles a year.  That was refilled today.  She has not followed up with them regarding infertility in some time.  Referral placed today.  She also wanted to review her blood plaque pregnancy test today just in case.  I will follow-up with that result.  - medroxyPROGESTERone (PROVERA) 10 MG tablet; Take 1 tablet (10 mg) by mouth daily PRN usually about 10 days per month  Dispense: 90 tablet; Refill: 1  - Ob/Gyn Referral; Future  - hCG Quantitative Pregnancy; Future  - hCG Quantitative Pregnancy    7. Trouble in sleeping  Chronic, stable.  Generally well controlled with Benadryl nightly.  I reviewed with her that Benadryl is if Benadryl controls her sleep problems, will be fine to remain on that.  I discussed if she wanted to avoid medications, she could try relaxation or sleep apps on her phone, or see a sleep therapist.  She will continue with Benadryl and then monitor for now.    8. Snoring  Chronic, stable.  Could be contributing to  poor sleep.  No known apneic episodes.  Weight could be playing a factor in her snoring.  I think weight management would be a good first step for her, then if that does not take care of her symptoms, could consider sleep clinic referral.  She agrees with this plan.    9. Pain of finger of left hand  Chronic, stable.  Exam was grossly normal today.  She says the last time she had pain like this, she had a ganglion cyst in her wrist.  She would like to be evaluated Ortho.  Referral sent today.  - Orthopedic  Referral; Future    10. Vaginal discharge  New problem.  Most likely normal variant, especially with her irregular periods.  Screening labs ordered and I will follow-up with results.  - Wet preparation  - Chlamydia trachomatis/Neisseria gonorrhoeae by PCR    11. Vaginal bleeding  New problem.  See #10.  Most likely normal variant, especially with her regular periods.  Screening Pap smear ordered to rule out any cause of a cervical cancer as a cause.  Pregnancy test ordered 2.  I anticipate that labs will be normal, and she can continue to monitor this.  - Pap imaged thin layer screen reflex to HPV if ASCUS - recommend age 25 - 29  - hCG Quantitative Pregnancy; Future  - hCG Quantitative Pregnancy      This dictation uses voice recognition software, which may contain typographical errors.

## 2023-04-04 LAB
C TRACH DNA SPEC QL PROBE+SIG AMP: NEGATIVE
HCV AB SERPL QL IA: NONREACTIVE
HIV 1+2 AB+HIV1 P24 AG SERPL QL IA: NONREACTIVE
N GONORRHOEA DNA SPEC QL NAA+PROBE: NEGATIVE
T PALLIDUM AB SER QL: NONREACTIVE

## 2023-04-05 LAB
BKR LAB AP GYN ADEQUACY: NORMAL
BKR LAB AP GYN INTERPRETATION: NORMAL
BKR LAB AP HPV REFLEX: NORMAL
BKR LAB AP PREVIOUS ABNORMAL: NORMAL
PATH REPORT.COMMENTS IMP SPEC: NORMAL
PATH REPORT.COMMENTS IMP SPEC: NORMAL
PATH REPORT.RELEVANT HX SPEC: NORMAL

## 2023-04-10 NOTE — PROGRESS NOTES
Sakshi White  :  1995  DOS: 4/10/2023  MRN: [id number]    Sports Medicine Clinic Visit    PCP: Tita Stevenson    Sakshi White is a 28 year old Left hand dominant female who is seen in consultation at the request of  Tita Stevenson PA-C presenting with left hand pain    Injury: Gradual onset of pain over the past 1 month(s).  Pain located over left hand, intermittent aching pain in the fingers, nonradiating.  Additional Features:  Positive: swelling and stiffness in the ring finger.  Symptoms are better with Tylenol and Ibuprofen.  Symptoms are worse with: extension, flexion and gripping .  Other evaluation and/or treatments so far consists of: Nothing.  Recent imaging completed: No recent imaging completed.  Prior History of related problems: In 2019 she has a right wrist, ganglion cyst surgically excised.     Social History: she works at a homeless shelter    Review of Systems  Musculoskeletal: as above  Remainder of review of systems is negative including constitutional, CV, pulmonary, GI, Skin and Neurologic except as noted in HPI or medical history.    Past Medical History:   Diagnosis Date     Depression     talk therapy     PCOS (polycystic ovarian syndrome)      Secondary amenorrhea      Vaginitis      Varicella     as child     Past Surgical History:   Procedure Laterality Date     PELVIC LAPAROSCOPY  2015     WRIST GANGLION EXCISION Right 2019     Family History   Problem Relation Age of Onset     Hypertension Mother      Kidney Disease Mother      Depression Mother      Diabetes Father      Hypertension Father      Cancer Maternal Grandmother         unsure type, organ failure, used to be drug addict     Diabetes Paternal Grandmother      Vision Loss Paternal Grandmother      Hypertension Paternal Grandmother      No Known Problems Sister      No Known Problems Brother      No Known Problems Brother      No Known Problems Sister      No Known Problems Sister      No Known  "Problems Sister        Objective  /89   Pulse 93   Ht 1.575 m (5' 2\")   Wt 88.9 kg (196 lb)   BMI 35.85 kg/m        General: healthy, alert and in no distress      HEENT: no scleral icterus or conjunctival erythema     Skin: no suspicious lesions or rash. No jaundice.     CV: regular rhythm by palpation, 2+ distal pulses, no pedal edema      Resp: normal respiratory effort without conversational dyspnea     Psych: normal mood and affect      Gait: nonantalgic, appropriate coordination and balance     Neuro: normal light touch sensory exam of the extremities. Motor strength as noted below     Left Wrist and Hand exam    Inspection:       No swelling, bruising or deformity left    Tender:       Mild TTP of the 2nd and 4th PIP joints    Non Tender:       Remainder of the Wrist and Hand left    ROM:       Full and symmetric active and passive range of motion of the forearm, wrist and digits left    Strength:       5/5 strength in the muscles of the hand, wrist and forearm left    Special Tests:        neg (-) laxity with collateral stress testing  left PIP joints    Neurovascular:       2+ radial pulses bilaterally with brisk capillary refill and      normal sensation to light touch in the radial, median and ulnar nerve distributions, neg Tinel's and Phalens      Radiology:  Recent Results (from the past 744 hour(s))   XR Hand Left G/E 3 Views    Narrative    XR HAND LEFT G/E 3 VIEWS 4/12/2023 11:00 AM     HISTORY: Pain of finger of left hand    COMPARISON: None.         Impression    IMPRESSION: Negative left hand.    AMARI WILLS MD         SYSTEM ID:  [id number]       Assessment:  1. Pain of finger of left hand        Plan:  Discussed the assessment with the patient.  Follow up: prn based on clinical progress  Reassurance today  No compromise in tendon function, no concern for fracture or infection  Could be tendon strain, joint irritation, early trigger finger less likely, advised to treat " conservatively  PIP joint taping, compression gloves, topical pain medication strategies reviewed  Consider advanced imaging or Ot if sx worsen, defer for now  XR images independently visualized and reviewed with patient today in clinic  We discussed modified progressive pain-free activity as tolerated  Home handouts provided and supportive care reviewed  All questions were answered today  Contact us with additional questions or concerns  Signs and sx of concern reviewed    Thanks very much for sending this nice lady to us, I will keep you updated with her progress      Dejon Mcallister DO, CA  Sports Medicine Physician  [facility]            Disclaimer: This note consists of symbols derived from keyboarding, dictation and/or voice recognition software. As a result, there may be errors in the script that have gone undetected. Please consider this when interpreting information found in this chart.

## 2023-04-12 ENCOUNTER — ANCILLARY PROCEDURE (OUTPATIENT)
Dept: GENERAL RADIOLOGY | Facility: CLINIC | Age: 28
End: 2023-04-12
Attending: FAMILY MEDICINE
Payer: COMMERCIAL

## 2023-04-12 ENCOUNTER — OFFICE VISIT (OUTPATIENT)
Dept: ORTHOPEDICS | Facility: CLINIC | Age: 28
End: 2023-04-12
Attending: PHYSICIAN ASSISTANT
Payer: COMMERCIAL

## 2023-04-12 VITALS
HEIGHT: 62 IN | BODY MASS INDEX: 36.07 KG/M2 | WEIGHT: 196 LBS | SYSTOLIC BLOOD PRESSURE: 125 MMHG | HEART RATE: 93 BPM | DIASTOLIC BLOOD PRESSURE: 89 MMHG

## 2023-04-12 DIAGNOSIS — M79.645 PAIN OF FINGER OF LEFT HAND: Primary | ICD-10-CM

## 2023-04-12 DIAGNOSIS — M79.645 PAIN OF FINGER OF LEFT HAND: ICD-10-CM

## 2023-04-12 PROCEDURE — 73130 X-RAY EXAM OF HAND: CPT | Mod: TC | Performed by: RADIOLOGY

## 2023-04-12 PROCEDURE — 99203 OFFICE O/P NEW LOW 30 MIN: CPT | Performed by: FAMILY MEDICINE

## 2023-04-12 ASSESSMENT — PAIN SCALES - GENERAL: PAINLEVEL: MILD PAIN (2)

## 2023-04-12 NOTE — LETTER
2023         RE: Sakshi White  [address]  [place] [address]        Dear Colleague,    Thank you for referring your patient, Sakshi White, to the [facility]. Please see a copy of my visit note below.    Sakshi White  :  1995  DOS: 4/10/2023  MRN: [id number]    Sports Medicine Clinic Visit    PCP: Tita Stevenson    Sakshi White is a 28 year old Left hand dominant female who is seen in consultation at the request of  Tita Stevenson PA-C presenting with left hand pain    Injury: Gradual onset of pain over the past 1 month(s).  Pain located over left hand, intermittent aching pain in the fingers, nonradiating.  Additional Features:  Positive: swelling and stiffness in the ring finger.  Symptoms are better with Tylenol and Ibuprofen.  Symptoms are worse with: extension, flexion and gripping .  Other evaluation and/or treatments so far consists of: Nothing.  Recent imaging completed: No recent imaging completed.  Prior History of related problems: In 2019 she has a right wrist, ganglion cyst surgically excised.     Social History: she works at a homeless shelter    Review of Systems  Musculoskeletal: as above  Remainder of review of systems is negative including constitutional, CV, pulmonary, GI, Skin and Neurologic except as noted in HPI or medical history.    Past Medical History:   Diagnosis Date     Depression     talk therapy     PCOS (polycystic ovarian syndrome)      Secondary amenorrhea      Vaginitis      Varicella     as child     Past Surgical History:   Procedure Laterality Date     PELVIC LAPAROSCOPY  2015     WRIST GANGLION EXCISION Right 2019     Family History   Problem Relation Age of Onset     Hypertension Mother      Kidney Disease Mother      Depression Mother      Diabetes Father      Hypertension Father      Cancer Maternal Grandmother         unsure type, organ failure, used to be drug addict     Diabetes  "Paternal Grandmother      Vision Loss Paternal Grandmother      Hypertension Paternal Grandmother      No Known Problems Sister      No Known Problems Brother      No Known Problems Brother      No Known Problems Sister      No Known Problems Sister      No Known Problems Sister        Objective  /89   Pulse 93   Ht 1.575 m (5' 2\")   Wt 88.9 kg (196 lb)   BMI 35.85 kg/m        General: healthy, alert and in no distress      HEENT: no scleral icterus or conjunctival erythema     Skin: no suspicious lesions or rash. No jaundice.     CV: regular rhythm by palpation, 2+ distal pulses, no pedal edema      Resp: normal respiratory effort without conversational dyspnea     Psych: normal mood and affect      Gait: nonantalgic, appropriate coordination and balance     Neuro: normal light touch sensory exam of the extremities. Motor strength as noted below     Left Wrist and Hand exam    Inspection:       No swelling, bruising or deformity left    Tender:       Mild TTP of the 2nd and 4th PIP joints    Non Tender:       Remainder of the Wrist and Hand left    ROM:       Full and symmetric active and passive range of motion of the forearm, wrist and digits left    Strength:       5/5 strength in the muscles of the hand, wrist and forearm left    Special Tests:        neg (-) laxity with collateral stress testing  left PIP joints    Neurovascular:       2+ radial pulses bilaterally with brisk capillary refill and      normal sensation to light touch in the radial, median and ulnar nerve distributions, neg Tinel's and Phalens      Radiology:  Recent Results (from the past 744 hour(s))   XR Hand Left G/E 3 Views    Narrative    XR HAND LEFT G/E 3 VIEWS 4/12/2023 11:00 AM     HISTORY: Pain of finger of left hand    COMPARISON: None.         Impression    IMPRESSION: Negative left hand.    AMARI WILLS MD         SYSTEM ID:  [id number]       Assessment:  1. Pain of finger of left hand        Plan:  Discussed the " assessment with the patient.  Follow up: prn based on clinical progress  Reassurance today  No compromise in tendon function, no concern for fracture or infection  Could be tendon strain, joint irritation, early trigger finger less likely, advised to treat conservatively  PIP joint taping, compression gloves, topical pain medication strategies reviewed  Consider advanced imaging or Ot if sx worsen, defer for now  XR images independently visualized and reviewed with patient today in clinic  We discussed modified progressive pain-free activity as tolerated  Home handouts provided and supportive care reviewed  All questions were answered today  Contact us with additional questions or concerns  Signs and sx of concern reviewed    Thanks very much for sending this nice lady to us, I will keep you updated with her progress      Dejon Mcallister DO, IMELDA  Sports Medicine Physician  [facility]            Disclaimer: This note consists of symbols derived from keyboarding, dictation and/or voice recognition software. As a result, there may be errors in the script that have gone undetected. Please consider this when interpreting information found in this chart.        Again, thank you for allowing me to participate in the care of your patient.        Sincerely,        Dejon Mcallister DO

## 2023-04-14 ENCOUNTER — LAB (OUTPATIENT)
Dept: LAB | Facility: CLINIC | Age: 28
End: 2023-04-14
Payer: COMMERCIAL

## 2023-04-14 DIAGNOSIS — K21.9 GASTROESOPHAGEAL REFLUX DISEASE WITHOUT ESOPHAGITIS: ICD-10-CM

## 2023-04-14 PROCEDURE — 87338 HPYLORI STOOL AG IA: CPT

## 2023-04-18 LAB — H PYLORI AG STL QL IA: POSITIVE

## 2023-04-20 ENCOUNTER — MYC MEDICAL ADVICE (OUTPATIENT)
Dept: FAMILY MEDICINE | Facility: CLINIC | Age: 28
End: 2023-04-20
Payer: COMMERCIAL

## 2023-04-20 ENCOUNTER — PATIENT OUTREACH (OUTPATIENT)
Dept: CARE COORDINATION | Facility: CLINIC | Age: 28
End: 2023-04-20
Payer: COMMERCIAL

## 2023-04-20 DIAGNOSIS — Z86.19 HISTORY OF HELICOBACTER PYLORI INFECTION: Primary | ICD-10-CM

## 2023-04-20 DIAGNOSIS — A04.8 H. PYLORI INFECTION: Primary | ICD-10-CM

## 2023-04-20 RX ORDER — CLARITHROMYCIN 500 MG
500 TABLET ORAL 2 TIMES DAILY
Qty: 28 TABLET | Refills: 0 | Status: SHIPPED | OUTPATIENT
Start: 2023-04-20 | End: 2023-05-04

## 2023-04-20 RX ORDER — AMOXICILLIN 500 MG/1
1000 CAPSULE ORAL 2 TIMES DAILY
Qty: 56 CAPSULE | Refills: 0 | Status: SHIPPED | OUTPATIENT
Start: 2023-04-20 | End: 2023-05-04

## 2023-04-22 ENCOUNTER — HEALTH MAINTENANCE LETTER (OUTPATIENT)
Age: 28
End: 2023-04-22

## 2023-04-22 PROBLEM — R06.83 SNORING: Status: ACTIVE | Noted: 2023-04-22

## 2023-04-24 RX ORDER — FLUCONAZOLE 150 MG/1
150 TABLET ORAL ONCE
Qty: 2 TABLET | Refills: 0 | Status: SHIPPED | OUTPATIENT
Start: 2023-04-24 | End: 2023-05-08

## 2023-04-24 NOTE — TELEPHONE ENCOUNTER
Patient calling regarding Rx for yeast. Tita BOLIVAR ordered medication.  No further action needed.      Merna Alaniz RN  [facility]      fluconazole (DIFLUCAN) 150 MG tablet 2 tablet 0 4/24/2023 4/24/2023 --   Sig - Route: Take 1 tablet (150 mg) by mouth once for 1 dose Take second pill after finishing medication, if needed. - [id number] - [place], [place] - [address] AT [facility]

## 2023-04-24 NOTE — TELEPHONE ENCOUNTER
Message routed to Tita BOLIVAR for review / plan.    Merna Alaniz RN  [facility]    [facility]Ob Support Pool (supporting Tita Stevenson PA-C)     If there is a prescription being sent in for my yeast please send it to the [facility] pharmacy on [address] [facility] in [place] please.

## 2023-05-01 ENCOUNTER — MYC MEDICAL ADVICE (OUTPATIENT)
Dept: FAMILY MEDICINE | Facility: CLINIC | Age: 28
End: 2023-05-01
Payer: COMMERCIAL

## 2023-05-18 ENCOUNTER — OFFICE VISIT (OUTPATIENT)
Dept: OBGYN | Facility: CLINIC | Age: 28
End: 2023-05-18
Payer: COMMERCIAL

## 2023-05-18 VITALS
HEART RATE: 90 BPM | WEIGHT: 189 LBS | DIASTOLIC BLOOD PRESSURE: 72 MMHG | SYSTOLIC BLOOD PRESSURE: 124 MMHG | HEIGHT: 62 IN | OXYGEN SATURATION: 99 % | BODY MASS INDEX: 34.78 KG/M2

## 2023-05-18 DIAGNOSIS — N97.0 PRIMARY ANOVULATORY INFERTILITY: ICD-10-CM

## 2023-05-18 DIAGNOSIS — E28.2 PCOS (POLYCYSTIC OVARIAN SYNDROME): Primary | ICD-10-CM

## 2023-05-18 PROCEDURE — 99203 OFFICE O/P NEW LOW 30 MIN: CPT | Performed by: OBSTETRICS & GYNECOLOGY

## 2023-05-18 RX ORDER — FLUCONAZOLE 150 MG/1
TABLET ORAL
COMMUNITY
Start: 2023-05-09 | End: 2023-09-07

## 2023-05-23 ASSESSMENT — PATIENT HEALTH QUESTIONNAIRE - PHQ9: SUM OF ALL RESPONSES TO PHQ QUESTIONS 1-9: 7

## 2023-05-25 NOTE — PROGRESS NOTES
CC: Sakshi White is here secondary to a desire for pregnancy.    HPI: The pt is a 28 year old SAAF P0 who presents with a diagnosis of PCOS and a history of fertility treatment in the past.  She is here with her partner.  She has had irregular periods, although she was on antibiotics in Feb and again in April, and both times she got a period with taking them.  She did have some light flow in March.  Before Feb, she would only get a period if she used Provera.  She also recently started on metformin.  She had tried it in the past, but it gave her heartburn.  Since she had the H pylori treated, she is not having that issue.  She has had 2 HSGs in the past.  The first was in 2015 and both tubes were blocked.  The second was on 9/16/22 and showed a blocked left tube with an irregular appearing right tube with slow fill.  [place] [facility] [place] sounds like chromotubation was done that was normal; those results aren't available.  She has been seen at [facility] where letrozole and injectables were used without successful conception.  She has also used Clomid without resulting cycles.  Lab work was done in Jan that was consistent with PCOS.    Past Medical History:   Diagnosis Date     Depression     talk therapy     PCOS (polycystic ovarian syndrome)      Secondary amenorrhea      Vaginitis      Varicella     as child       Past Surgical History:   Procedure Laterality Date     PELVIC LAPAROSCOPY  08/19/2015     WRIST GANGLION EXCISION Right 2019       Patient's   Family History   Problem Relation Age of Onset     Hypertension Mother      Kidney Disease Mother      Depression Mother      Diabetes Father      Hypertension Father      Cancer Maternal Grandmother         unsure type, organ failure, used to be drug addict     Diabetes Paternal Grandmother      Vision Loss Paternal Grandmother      Hypertension Paternal Grandmother      No Known Problems Brother      No Known Problems Brother      No Known  "Problems Sister      No Known Problems Sister      No Known Problems Sister      No Known Problems Sister        Patient   Social History     Socioeconomic History     Marital status: Single     Spouse name: None     Number of children: 0     Years of education: None     Highest education level: None   Tobacco Use     Smoking status: Never     Passive exposure: Never     Smokeless tobacco: Never   Vaping Use     Vaping status: Never Used   Substance and Sexual Activity     Alcohol use: Yes     Comment: occasional     Drug use: No     Sexual activity: Yes     Partners: Male     Birth control/protection: None     Comment: 2+ year monog   Social History Narrative    Patient lives with her boyfriend.  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       Outpatient Medications Prior to Visit   Medication Sig Dispense Refill     diphenhydrAMINE (BENADRYL) 25 MG capsule Take 50 mg by mouth nightly as needed for itching or allergies       fluconazole (DIFLUCAN) 150 MG tablet        ibuprofen (ADVIL/MOTRIN) 600 MG tablet Take 1 tablet (600 mg) by mouth every 6 hours as needed for moderate pain 60 tablet 0     medroxyPROGESTERone (PROVERA) 10 MG tablet Take 1 tablet (10 mg) by mouth daily PRN usually about 10 days per month 90 tablet 1     omeprazole (PRILOSEC) 20 MG DR capsule Take 1 capsule (20 mg) by mouth daily 90 capsule 3     famotidine (PEPCID) 20 MG tablet Take 1 tablet (20 mg) by mouth daily as needed (acid reflux) 90 tablet 1     vitamin D3 (CHOLECALCIFEROL) 50 mcg (2000 units) tablet Take 1 tablet by mouth daily       No facility-administered medications prior to visit.       Patient has No Known Allergies.    ROS:  12 part ROS is negative aside from those symptoms in the HPI    PE:  /72 (BP Location: Right arm, Patient Position: Sitting, Cuff Size: Adult Regular)   Pulse 90   Ht 1.575 m (5' 2\")   Wt 85.7 kg (189 lb)           Body mass index is 34.57 " kg/m .    General: obese AAF, NAD  Psych: normal mood  Neuro: CN I-XII grossly intact  MS: normal gait    Assessment: 28 year old SAAF P0 with primary infertility, with at least 1 blocked fallopian tube, PCOS.    Plan: Natural history of fertility discussed with the patient.  We discussed the metformin, and I encouraged its continued use.  We discussed the findings on the HSG along with the lab work.  We discussed her past attempts at pregnancy.  After discussing all this, decision was made to refer her back to [facility].  She wants to see [facility] again; that referral was placed.  We discussed that potentially they may want her tubes to be removed since at least one of them was blocked and likely the other doesn't function well.  Records were requested to try to get the operative note from 2015.  Questions were answered to the best of my ability.    42 minutes spent on the date of the encounter doing chart review, review of outside records, review of test results, interpretation of tests, patient visit and documentation

## 2023-06-12 PROBLEM — R79.89 ELEVATED PROLACTIN LEVEL: Status: ACTIVE | Noted: 2023-06-12

## 2023-06-12 PROBLEM — N84.0 ENDOMETRIAL POLYP: Status: ACTIVE | Noted: 2023-06-12

## 2023-06-16 ENCOUNTER — OFFICE VISIT (OUTPATIENT)
Dept: ENDOCRINOLOGY | Facility: CLINIC | Age: 28
End: 2023-06-16
Payer: COMMERCIAL

## 2023-06-16 ENCOUNTER — MYC MEDICAL ADVICE (OUTPATIENT)
Dept: ENDOCRINOLOGY | Facility: CLINIC | Age: 28
End: 2023-06-16

## 2023-06-16 VITALS
DIASTOLIC BLOOD PRESSURE: 72 MMHG | WEIGHT: 185.8 LBS | SYSTOLIC BLOOD PRESSURE: 104 MMHG | HEART RATE: 83 BPM | BODY MASS INDEX: 33.98 KG/M2 | OXYGEN SATURATION: 98 %

## 2023-06-16 DIAGNOSIS — N91.2 AMENORRHEA: ICD-10-CM

## 2023-06-16 DIAGNOSIS — E28.2 POLYCYSTIC OVARY SYNDROME: Primary | ICD-10-CM

## 2023-06-16 DIAGNOSIS — E66.01 MORBID OBESITY (H): ICD-10-CM

## 2023-06-16 PROCEDURE — 84146 ASSAY OF PROLACTIN: CPT | Performed by: INTERNAL MEDICINE

## 2023-06-16 PROCEDURE — 99215 OFFICE O/P EST HI 40 MIN: CPT | Performed by: INTERNAL MEDICINE

## 2023-06-16 PROCEDURE — 36415 COLL VENOUS BLD VENIPUNCTURE: CPT | Performed by: INTERNAL MEDICINE

## 2023-06-16 NOTE — LETTER
2023         RE: Sakshi [place]  [id number] [address]        Dear Colleague,    Thank you for referring your patient, Sakshi White, to the [facility]. Please see a copy of my visit note below.      ENDOCRINOLOGY FOLLOW-UP        HISTORY OF PRESENT ILLNESS    Sakshi White is seen in follow-up.  Patient is accompanied by her partner to this visit.    In the interim since initial visit in 2023, patient completed the endocrine testing that I recommended.    Had 2 episodes of menstrual bleeding in the interim since last visit: Both were in the context of amoxicillin therapy in 3/20/2023 and 2023.  Otherwise, no menstrual cycle.    No galactorrhea, no breast tenderness.    Seen in gynecology: Notes reviewed.  There is concern for blocked fallopian tube.  Continued metformin therapy was recommended.  After discussion of possible next steps, she was referred back to reproductive endocrinology.  Ms. White hopes to also be evaluated at [facility], where she has followed in the past, and is anticipating an appointment on 2023 to see if there are any other available options for treatment of fertility issues.    She has been back on metformin for the past month and has titrated up to 3 tablets daily (presently taking 2 in the morning and 1 in the evening).  With increase to 3 tablets a day, she has noted more bloating.  However, no nausea, no consistent bowel habit changes.    Pertinent endocrine and related history:  1. PCOS. Diagnosis of PCOS made around age 18.  -Menarche was at age 11.  After onset of menarche, the patient recalls amenorrhea.  She was therefore prescribed OCP.  She had regular menstrual cycles while on OCP, although she took inconsistently.  -Hirsutism since around age 16.  Hair growth is primarily on the face and abdomen.  She removes hair by waxing every week.  -.  Has been trying to conceive since at least  and has not been  on OCP since then.  Despite this, she has had amenorrhea.  She has been prescribed Provera in gynecology and takes this periodically: Endorses withdrawal bleeding with Provera.  -Was prescribed metformin (along with spironolactone) in the past.  Was not able to tolerate metformin in the past since it caused worsening of reflux symptoms.  -She recalls laparoscopy in 2015 for unknown condition related to fallopian tubes.  -She was treated with letrozole without successful conception.  2.  Elevated prolactin.  Testing in OB/GYN  (in 8/2022) revealed mildly elevated prolactin: This prompted referral to endocrinology. Recheck prolactin was normal.  3.  Prediabetes.  Family history is notable for father and multiple paternal relatives with diabetes.      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    PAST MEDICAL HISTORY  Past Medical History:   Diagnosis Date     Depression     talk therapy     PCOS (polycystic ovarian syndrome)      Secondary amenorrhea      Vaginitis      Varicella     as child       MEDICATIONS  Current Outpatient Medications   Medication Sig Dispense Refill     diphenhydrAMINE (BENADRYL) 25 MG capsule Take 50 mg by mouth nightly as needed for itching or allergies       ibuprofen (ADVIL/MOTRIN) 600 MG tablet Take 1 tablet (600 mg) by mouth every 6 hours as needed for moderate pain 60 tablet 0     medroxyPROGESTERone (PROVERA) 10 MG tablet Take 1 tablet (10 mg) by mouth daily PRN usually about 10 days per month 90 tablet 1     metFORMIN (GLUCOPHAGE) 500 MG tablet Take 500 mg by mouth 2 times daily (with meals)       omeprazole (PRILOSEC) 20 MG DR capsule Take 1 capsule (20 mg) by mouth daily 90 capsule 3     fluconazole (DIFLUCAN) 150 MG tablet  (Patient not taking: Reported on 6/16/2023)         Allergies, family, and social history were reviewed and documented as needed in EHR.     REVIEW OF SYSTEMS  A focused ROS was performed, with pertinent  positives and negatives as noted in the HPI.    PHYSICAL EXAM  /72 (BP Location: Right arm, Patient Position: Sitting, Cuff Size: Adult Large)   Pulse 83   Wt 84.3 kg (185 lb 12.8 oz)   SpO2 98%   BMI 33.98 kg/m    Body mass index is 33.98 kg/m .  Constitutional: Vital signs reviewed, as recorded above. Patient is alert, oriented and at times tearful during our discussion.  Eyes: PER, EOMI, no stare, lid lag, or retraction; no conjunctival injection.  Respiratory: Normal chest wall motion and respiratory effort.  MSK: No clubbing or cyanosis; normal muscle bulk and tone.  Skin: No lesions on visible skin,  Neurological: Alert and oriented times 3. No tremor.        DATA REVIEW  Each of the following laboratory and/or imaging studies were reviewed.    Component      Latest Ref Rng 2/9/2023  8:09 AM   See Scanned Result Specimen received. Reordered and sent to performing laboratory. Report to follow up on completion.    Performing Laboratory [facility]    Test Name Dexamethasone, Serum or Plasma by LC-MS/MS DEXA TMS    Test Code [id number]    Cortisol Serum        ug/dL 0.5    Miscellaneous Test SEE NOTE      Component      Latest Ref Rng 3/10/2023  1:53 PM 3/10/2023  1:54 PM   Cortisol Free Urine Random      ug/L 14.30     Cortisol ug/g creatinine      ug/g CRT 13.24     Cortisol Free Urine      <=45.0 ug/d 7.9     Creatinine Urine/Volume      mg/dL 108     Creatinine Urine/24hr      700 - 1600 mg/d 594 (L)     Cortisol Free Urine Intrepretation See Note     See Scanned Result     Performing Laboratory     Test Name     Test Code     Creatinine Urine      mg/dL  109    Creatinine Urine Timed      0.80 - 1.80 g/spec  0.60 (L)    Volume in mL      mL  550    Duration in hours      h  24.0       Legend:  (L) Low    Component      Latest Ref Rng 1/25/2023  12:40 PM   Sodium      136 - 145 mmol/L 139    Potassium      3.4 - 5.3 mmol/L 3.9    Chloride      98 - 107 mmol/L 102    Carbon Dioxide (CO2)      22 -  29 mmol/L 25    Anion Gap      7 - 15 mmol/L 12    Urea Nitrogen      6.0 - 20.0 mg/dL 13.1    Creatinine      0.51 - 0.95 mg/dL 0.76    Calcium      8.6 - 10.0 mg/dL 9.3    Glucose      70 - 99 mg/dL 85    Alkaline Phosphatase      35 - 104 U/L 45    AST      10 - 35 U/L 26    ALT      10 - 35 U/L 14    Protein Total      6.4 - 8.3 g/dL 7.3    Albumin      3.5 - 5.2 g/dL 4.2    Bilirubin Total      <=1.2 mg/dL 0.2    GFR Estimate      >60 mL/min/1.73m2 >90    Testosterone Total      8 - 60 ng/dL 41    hCG Quantitative      <5 mIU/mL <1    Prolactin      5 - 23 ng/mL 11    Ins Growth Factor 1      91 - 293 ng/mL 145    DHEA Sulfate      35 - 430 ug/dL 161    17-OH Progesterone      <=630 ng/dL 48    TSH      0.30 - 4.20 uIU/mL 1.93    T4 Free      0.90 - 1.70 ng/dL 1.16    Luteinizing Hormone      mIU/mL 16.1    FSH      mIU/mL 6.8    Estradiol      pg/mL 49    Cortisol Serum        ug/dL    Miscellaneous Test        ASSESSMENT  1.  History of irregular menstrual cycles/amenorrhea.  Prior diagnosis of PCOS.  There was a new finding of hyperprolactinemia in 8/2022, with normal prolactin on recheck (see discussion below).  Remainder of work-up for endocrinopathies which could have a similar presentation as PCOS has otherwise been unremarkable: Specifically, no evidence of cortisol excess (although higher than expected dexamethasone levels on DST with normal cortisol response, 24-hour urine free cortisol levels were normal; thyroid function tests, testosterone, DHEA-sulfate, IGF-I and 17 hydroxyprogesterone has been normal).  Based on this, the patient's presentation is most consistent with PCOS.  Recommend focusing on input from gynecology and reproductive endocrinology on how best to achieve fertility  Would continue metformin: We discussed confirming that she is on extended release formulation to ensure that she can titrate dose upward with a more tolerable formulation.  We also discussed the importance of long-term  surveillance for metabolic sequelae of PCOS; we will check A1c prior to next visit.    2.  Hyperprolactinemia.  Mild.  Normalized on recheck.  Check once more today to ensure prolactin remains normal.      3.  High risk for diabetes.  Now back on metformin, tolerating better after treatment of H. pylori infection.  Nonetheless, having some bloating at current 1500 mg dose--have asked her to confirm that she is on extended release formulation and update me if now. We will check A1c prior to next visit.    4.  Infertility.  See discussion above.    PLAN  -Labs today  -Check metformin bottle--if not extended release, please let me know and I can send sent a prescription since extended release may be more tolerable  -Follow-up in [facility] gynecology as scheduled end of June  -Give some thought to weight management referral  -Return for a follow-up visit earliest available, with labs before visit (we will check A1c)  -We will communicate results via [employer], or if needed by phone      Orders Placed This Encounter   Procedures     Prolactin     Hemoglobin A1c     Basic metabolic panel         I spent a total of 44 minutes on the date of encounter reviewing medical records, evaluating the patient, coordinating care and documenting in the EHR, as detailed above.      Maylin Sotelo MD   Division of Diabetes, Endocrinology and Metabolism  Department of Medicine                 Again, thank you for allowing me to participate in the care of your patient.        Sincerely,        MAYLIN Sotelo MD

## 2023-06-16 NOTE — PATIENT INSTRUCTIONS
-Labs today  -Check metformin bottle--if not extended release, please let me know and I can send sent a prescription since extended release may be more tolerable  -Follow-up in [facility] gynecology as scheduled end of June  -Give some thought to weight management referral  -Return for a follow-up visit earliest available, with labs before visit (we will check A1c)  -We will communicate results via [employer], or if needed by phone

## 2023-06-16 NOTE — PROGRESS NOTES
ENDOCRINOLOGY FOLLOW-UP        HISTORY OF PRESENT ILLNESS    Sakshi White is seen in follow-up.  Patient is accompanied by her partner to this visit.    In the interim since initial visit in 2023, patient completed the endocrine testing that I recommended.    Had 2 episodes of menstrual bleeding in the interim since last visit: Both were in the context of amoxicillin therapy in 3/20/2023 and 2023.  Otherwise, no menstrual cycle.    No galactorrhea, no breast tenderness.    Seen in gynecology: Notes reviewed.  There is concern for blocked fallopian tube.  Continued metformin therapy was recommended.  After discussion of possible next steps, she was referred back to reproductive endocrinology.  Ms. White hopes to also be evaluated at [facility], where she has followed in the past, and is anticipating an appointment on 2023 to see if there are any other available options for treatment of fertility issues.    She has been back on metformin for the past month and has titrated up to 3 tablets daily (presently taking 2 in the morning and 1 in the evening).  With increase to 3 tablets a day, she has noted more bloating.  However, no nausea, no consistent bowel habit changes.    Pertinent endocrine and related history:  1. PCOS. Diagnosis of PCOS made around age 18.  -Menarche was at age 11.  After onset of menarche, the patient recalls amenorrhea.  She was therefore prescribed OCP.  She had regular menstrual cycles while on OCP, although she took inconsistently.  -Hirsutism since around age 16.  Hair growth is primarily on the face and abdomen.  She removes hair by waxing every week.  -.  Has been trying to conceive since at least  and has not been on OCP since then.  Despite this, she has had amenorrhea.  She has been prescribed Provera in gynecology and takes this periodically: Endorses withdrawal bleeding with Provera.  -Was prescribed metformin (along with spironolactone) in the past.  Was not  able to tolerate metformin in the past since it caused worsening of reflux symptoms.  -She recalls laparoscopy in 2015 for unknown condition related to fallopian tubes.  -She was treated with letrozole without successful conception.  2.  Elevated prolactin.  Testing in OB/GYN  (in 8/2022) revealed mildly elevated prolactin: This prompted referral to endocrinology. Recheck prolactin was normal.  3.  Prediabetes.  Family history is notable for father and multiple paternal relatives with diabetes.      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    PAST MEDICAL HISTORY  Past Medical History:   Diagnosis Date     Depression     talk therapy     PCOS (polycystic ovarian syndrome)      Secondary amenorrhea      Vaginitis      Varicella     as child       MEDICATIONS  Current Outpatient Medications   Medication Sig Dispense Refill     diphenhydrAMINE (BENADRYL) 25 MG capsule Take 50 mg by mouth nightly as needed for itching or allergies       ibuprofen (ADVIL/MOTRIN) 600 MG tablet Take 1 tablet (600 mg) by mouth every 6 hours as needed for moderate pain 60 tablet 0     medroxyPROGESTERone (PROVERA) 10 MG tablet Take 1 tablet (10 mg) by mouth daily PRN usually about 10 days per month 90 tablet 1     metFORMIN (GLUCOPHAGE) 500 MG tablet Take 500 mg by mouth 2 times daily (with meals)       omeprazole (PRILOSEC) 20 MG DR capsule Take 1 capsule (20 mg) by mouth daily 90 capsule 3     fluconazole (DIFLUCAN) 150 MG tablet  (Patient not taking: Reported on 6/16/2023)         Allergies, family, and social history were reviewed and documented as needed in EHR.     REVIEW OF SYSTEMS  A focused ROS was performed, with pertinent positives and negatives as noted in the HPI.    PHYSICAL EXAM  /72 (BP Location: Right arm, Patient Position: Sitting, Cuff Size: Adult Large)   Pulse 83   Wt 84.3 kg (185 lb 12.8 oz)   SpO2 98%   BMI 33.98 kg/m    Body mass index is 33.98  kg/m .  Constitutional: Vital signs reviewed, as recorded above. Patient is alert, oriented and at times tearful during our discussion.  Eyes: PER, EOMI, no stare, lid lag, or retraction; no conjunctival injection.  Respiratory: Normal chest wall motion and respiratory effort.  MSK: No clubbing or cyanosis; normal muscle bulk and tone.  Skin: No lesions on visible skin,  Neurological: Alert and oriented times 3. No tremor.        DATA REVIEW  Each of the following laboratory and/or imaging studies were reviewed.    Component      Latest Ref Rng 2/9/2023  8:09 AM   See Scanned Result Specimen received. Reordered and sent to performing laboratory. Report to follow up on completion.    Performing Laboratory [facility]    Test Name Dexamethasone, Serum or Plasma by LC-MS/MS DEXA TMS    Test Code [id number]    Cortisol Serum        ug/dL 0.5    Miscellaneous Test SEE NOTE      Component      Latest Ref Rng 3/10/2023  1:53 PM 3/10/2023  1:54 PM   Cortisol Free Urine Random      ug/L 14.30     Cortisol ug/g creatinine      ug/g CRT 13.24     Cortisol Free Urine      <=45.0 ug/d 7.9     Creatinine Urine/Volume      mg/dL 108     Creatinine Urine/24hr      700 - 1600 mg/d 594 (L)     Cortisol Free Urine Intrepretation See Note     See Scanned Result     Performing Laboratory     Test Name     Test Code     Creatinine Urine      mg/dL  109    Creatinine Urine Timed      0.80 - 1.80 g/spec  0.60 (L)    Volume in mL      mL  550    Duration in hours      h  24.0       Legend:  (L) Low    Component      Latest Ref Rng 1/25/2023  12:40 PM   Sodium      136 - 145 mmol/L 139    Potassium      3.4 - 5.3 mmol/L 3.9    Chloride      98 - 107 mmol/L 102    Carbon Dioxide (CO2)      22 - 29 mmol/L 25    Anion Gap      7 - 15 mmol/L 12    Urea Nitrogen      6.0 - 20.0 mg/dL 13.1    Creatinine      0.51 - 0.95 mg/dL 0.76    Calcium      8.6 - 10.0 mg/dL 9.3    Glucose      70 - 99 mg/dL 85    Alkaline Phosphatase      35 - 104 U/L 45     AST      10 - 35 U/L 26    ALT      10 - 35 U/L 14    Protein Total      6.4 - 8.3 g/dL 7.3    Albumin      3.5 - 5.2 g/dL 4.2    Bilirubin Total      <=1.2 mg/dL 0.2    GFR Estimate      >60 mL/min/1.73m2 >90    Testosterone Total      8 - 60 ng/dL 41    hCG Quantitative      <5 mIU/mL <1    Prolactin      5 - 23 ng/mL 11    Ins Growth Factor 1      91 - 293 ng/mL 145    DHEA Sulfate      35 - 430 ug/dL 161    17-OH Progesterone      <=630 ng/dL 48    TSH      0.30 - 4.20 uIU/mL 1.93    T4 Free      0.90 - 1.70 ng/dL 1.16    Luteinizing Hormone      mIU/mL 16.1    FSH      mIU/mL 6.8    Estradiol      pg/mL 49    Cortisol Serum        ug/dL    Miscellaneous Test        ASSESSMENT  1.  History of irregular menstrual cycles/amenorrhea.  Prior diagnosis of PCOS.  There was a new finding of hyperprolactinemia in 8/2022, with normal prolactin on recheck (see discussion below).  Remainder of work-up for endocrinopathies which could have a similar presentation as PCOS has otherwise been unremarkable: Specifically, no evidence of cortisol excess (although higher than expected dexamethasone levels on DST with normal cortisol response, 24-hour urine free cortisol levels were normal; thyroid function tests, testosterone, DHEA-sulfate, IGF-I and 17 hydroxyprogesterone has been normal).  Based on this, the patient's presentation is most consistent with PCOS.  Recommend focusing on input from gynecology and reproductive endocrinology on how best to achieve fertility  Would continue metformin: We discussed confirming that she is on extended release formulation to ensure that she can titrate dose upward with a more tolerable formulation.  We also discussed the importance of long-term surveillance for metabolic sequelae of PCOS; we will check A1c prior to next visit.    2.  Hyperprolactinemia.  Mild.  Normalized on recheck.  Check once more today to ensure prolactin remains normal.      3.  High risk for diabetes.  Now back on  metformin, tolerating better after treatment of H. pylori infection.  Nonetheless, having some bloating at current 1500 mg dose--have asked her to confirm that she is on extended release formulation and update me if now. We will check A1c prior to next visit.    4.  Infertility.  See discussion above.    PLAN  -Labs today  -Check metformin bottle--if not extended release, please let me know and I can send sent a prescription since extended release may be more tolerable  -[facility] of [facility]  -Give some thought to weight management referral  -Return for a follow-up visit earliest available, with labs before visit (we will check A1c)  -We will communicate results via [employer], or if needed by phone      Orders Placed This Encounter   Procedures     Prolactin     Hemoglobin A1c     Basic metabolic panel         I spent a total of 44 minutes on the date of encounter reviewing medical records, evaluating the patient, coordinating care and documenting in the EHR, as detailed above.      Maura Sotelo MD   Division of Diabetes, Endocrinology and Metabolism  Department of Medicine

## 2023-06-17 LAB — PROLACTIN SERPL 3RD IS-MCNC: 10 NG/ML (ref 5–23)

## 2023-06-19 RX ORDER — METFORMIN HCL 500 MG
TABLET, EXTENDED RELEASE 24 HR ORAL
Qty: 120 TABLET | Refills: 4 | Status: SHIPPED | OUTPATIENT
Start: 2023-06-19 | End: 2024-02-12

## 2023-06-19 NOTE — TELEPHONE ENCOUNTER
Per notes 6/16:  -Check metformin bottle--if not extended release, please let me know and I can send sent a prescription since extended release may be more tolerable

## 2023-07-13 ENCOUNTER — LAB (OUTPATIENT)
Dept: LAB | Facility: CLINIC | Age: 28
End: 2023-07-13
Payer: COMMERCIAL

## 2023-07-13 DIAGNOSIS — E28.2 POLYCYSTIC OVARY SYNDROME: ICD-10-CM

## 2023-07-13 LAB
ALBUMIN SERPL BCG-MCNC: 4 G/DL (ref 3.5–5.2)
ALP SERPL-CCNC: 46 U/L (ref 35–104)
ALT SERPL W P-5'-P-CCNC: 7 U/L (ref 0–50)
ANION GAP SERPL CALCULATED.3IONS-SCNC: 9 MMOL/L (ref 7–15)
AST SERPL W P-5'-P-CCNC: 18 U/L (ref 0–45)
BILIRUB SERPL-MCNC: 0.2 MG/DL
BUN SERPL-MCNC: 7.1 MG/DL (ref 6–20)
CALCIUM SERPL-MCNC: 9.2 MG/DL (ref 8.6–10)
CHLORIDE SERPL-SCNC: 103 MMOL/L (ref 98–107)
CREAT SERPL-MCNC: 0.77 MG/DL (ref 0.51–0.95)
DEPRECATED HCO3 PLAS-SCNC: 27 MMOL/L (ref 22–29)
GFR SERPL CREATININE-BSD FRML MDRD: >90 ML/MIN/1.73M2
GLUCOSE SERPL-MCNC: 101 MG/DL (ref 70–99)
HBA1C MFR BLD: 5.8 % (ref 0–5.6)
POTASSIUM SERPL-SCNC: 4 MMOL/L (ref 3.4–5.3)
PROT SERPL-MCNC: 6.9 G/DL (ref 6.4–8.3)
SODIUM SERPL-SCNC: 139 MMOL/L (ref 136–145)
TSH SERPL DL<=0.005 MIU/L-ACNC: 2.12 UIU/ML (ref 0.3–4.2)

## 2023-07-13 PROCEDURE — 80053 COMPREHEN METABOLIC PANEL: CPT

## 2023-07-13 PROCEDURE — 83036 HEMOGLOBIN GLYCOSYLATED A1C: CPT

## 2023-07-13 PROCEDURE — 36415 COLL VENOUS BLD VENIPUNCTURE: CPT

## 2023-07-13 PROCEDURE — 84443 ASSAY THYROID STIM HORMONE: CPT

## 2023-07-21 ENCOUNTER — DOCUMENTATION ONLY (OUTPATIENT)
Dept: LAB | Facility: CLINIC | Age: 28
End: 2023-07-21
Payer: COMMERCIAL

## 2023-07-21 NOTE — PROGRESS NOTES
Patient has upcoming lab only appointment for [facility], please review, and place future orders that may needed. If the lab is not needed, please let your care team follow up with patient.  Thank you  Jose alvarado

## 2023-08-04 ENCOUNTER — OFFICE VISIT (OUTPATIENT)
Dept: ENDOCRINOLOGY | Facility: CLINIC | Age: 28
End: 2023-08-04
Payer: COMMERCIAL

## 2023-08-04 VITALS
HEART RATE: 83 BPM | OXYGEN SATURATION: 98 % | DIASTOLIC BLOOD PRESSURE: 70 MMHG | SYSTOLIC BLOOD PRESSURE: 110 MMHG | BODY MASS INDEX: 34.66 KG/M2 | WEIGHT: 189.5 LBS

## 2023-08-04 DIAGNOSIS — E66.01 MORBID OBESITY (H): ICD-10-CM

## 2023-08-04 DIAGNOSIS — E28.2 POLYCYSTIC OVARY SYNDROME: Primary | ICD-10-CM

## 2023-08-04 PROCEDURE — 99213 OFFICE O/P EST LOW 20 MIN: CPT | Performed by: INTERNAL MEDICINE

## 2023-08-04 RX ORDER — CLOMIPHENE CITRATE 50 MG/1
TABLET ORAL
COMMUNITY
End: 2023-11-15

## 2023-08-04 NOTE — LETTER
2023         RE: Sakshi White  [id number] [address] [facility] [address]        Dear Colleague,    Thank you for referring your patient, Sakshi White, to the [facility]. Please see a copy of my visit note below.      ENDOCRINOLOGY FOLLOW-UP        HISTORY OF PRESENT ILLNESS    Sakshi White is seen in follow-up.  Patient is accompanied by her partner to this visit.    After last visit, we transitioned to extended release metformin in the event that would be more tolerable for patient.  She notes that she has been tolerating this formulation without side effect.  She has adjusted dose up to goal dose of 2000 mg/day.    Has had visits with OB/GYN, saw Dr. Rodriguez earlier this month at [facility].  On progesterone and planning to start Clomid.  Had intermittent menstrual spotting before initiation of progesterone.    Has given some thought to our discussion about weight management: Interested in referral to weight management clinic.    Pertinent endocrine and related history:  1. PCOS. Diagnosis of PCOS made around age 18.  -Menarche was at age 11.  After onset of menarche, the patient recalls amenorrhea.  She was therefore prescribed OCP.  She had regular menstrual cycles while on OCP, although she took inconsistently.  -Hirsutism since around age 16.  Hair growth is primarily on the face and abdomen.  She removes hair by waxing every week.  -.  Has been trying to conceive since at least  and has not been on OCP since then.  Despite this, she has had amenorrhea.  She has been prescribed Provera in gynecology and takes this periodically: Endorses withdrawal bleeding with Provera.  -Was prescribed metformin (along with spironolactone) in the past.  Was not able to tolerate metformin in the past since it caused worsening of reflux symptoms.  -She recalls laparoscopy in  for unknown condition related to fallopian tubes.  -She was treated with letrozole without successful  conception.  2.  Elevated prolactin.  Testing in OB/GYN  (in 8/2022) revealed mildly elevated prolactin: This prompted referral to endocrinology. Recheck prolactin was normal.  3.  Prediabetes.  Family history is notable for father and multiple paternal relatives with diabetes.      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    PAST MEDICAL HISTORY  Past Medical History:   Diagnosis Date     Depression     talk therapy     PCOS (polycystic ovarian syndrome)      Secondary amenorrhea      Vaginitis      Varicella     as child       MEDICATIONS  Current Outpatient Medications   Medication Sig Dispense Refill     diphenhydrAMINE (BENADRYL) 25 MG capsule Take 50 mg by mouth nightly as needed for itching or allergies       famotidine (PEPCID) 20 MG tablet Take 1 tablet (20 mg) by mouth daily as needed (for heartburn) 90 tablet 0     fluconazole (DIFLUCAN) 150 MG tablet  (Patient not taking: Reported on 6/16/2023)       ibuprofen (ADVIL/MOTRIN) 600 MG tablet Take 1 tablet (600 mg) by mouth every 6 hours as needed for moderate pain 60 tablet 0     medroxyPROGESTERone (PROVERA) 10 MG tablet Take 1 tablet (10 mg) by mouth daily PRN usually about 10 days per month 90 tablet 1     metFORMIN (GLUCOPHAGE XR) 500 MG 24 hr tablet Start 500 mg (1 tab) p.o. daily with a meal for 1 week; if tolerating, increase to 1000 mg (2 tabs) p.o. daily for 1 week; if tolerating, increase to 1500 mg (3 tabs) p.o. daily for 1 week; if tolerating, increase to 2000 mg (4 tablets) p.o. daily and continue at this dose for the long-term.  Take with food.  If side effect with a dose increase, reduce back to the last dose tolerated. 120 tablet 4     omeprazole (PRILOSEC) 20 MG DR capsule Take 1 capsule (20 mg) by mouth daily 90 capsule 3       Allergies, family, and social history were reviewed and documented as needed in EHR.     REVIEW OF SYSTEMS  A focused ROS was performed, with pertinent  positives and negatives as noted in the HPI.    PHYSICAL EXAM  /70 (BP Location: Right arm, Patient Position: Sitting, Cuff Size: Adult Large)   Pulse 83   Wt 86 kg (189 lb 8 oz)   SpO2 98%   BMI 34.66 kg/m    Body mass index is 34.66 kg/m .  Constitutional: Vital signs reviewed, as recorded above. Patient is alert, oriented and in no acute distress.  Eyes: PER, EOMI, no stare, lid lag, or retraction; no conjunctival injection.  Respiratory: Normal chest wall motion and respiratory effort.  MSK: No clubbing or cyanosis; normal muscle bulk and tone.  Skin: No lesions on visible skin,  Neurological: Alert and oriented times 3. No tremor.        DATA REVIEW  Each of the following laboratory and/or imaging studies were reviewed.      Component      Latest Ref Rng 7/13/2023  8:18 AM   Sodium      136 - 145 mmol/L 139    Potassium      3.4 - 5.3 mmol/L 4.0    Chloride      98 - 107 mmol/L 103    Carbon Dioxide (CO2)      22 - 29 mmol/L 27    Anion Gap      7 - 15 mmol/L 9    Urea Nitrogen      6.0 - 20.0 mg/dL 7.1    Creatinine      0.51 - 0.95 mg/dL 0.77    Calcium      8.6 - 10.0 mg/dL 9.2    Glucose      70 - 99 mg/dL 101 (H)    Alkaline Phosphatase      35 - 104 U/L 46    AST      0 - 45 U/L 18    ALT      0 - 50 U/L 7    Protein Total      6.4 - 8.3 g/dL 6.9    Albumin      3.5 - 5.2 g/dL 4.0    Bilirubin Total      <=1.2 mg/dL 0.2    GFR Estimate      >60 mL/min/1.73m2 >90    Prolactin      5 - 23 ng/mL    Hemoglobin A1C      0.0 - 5.6 % 5.8 (H)    TSH      0.30 - 4.20 uIU/mL 2.12       Legend:  (H) High    Component      Latest Ref Rng 6/16/2023  4:43 PM   Prolactin      5 - 23 ng/mL 10            ASSESSMENT  1.  History of irregular menstrual cycles/amenorrhea.  Prior diagnosis of PCOS.  There was a new finding of hyperprolactinemia in 8/2022, with normal prolactin on recheck.  Remainder of work-up for endocrinopathies which could have a similar presentation as PCOS has otherwise been unremarkable:  Specifically, no evidence of cortisol excess (although higher than expected dexamethasone levels on DST with normal cortisol response, 24-hour urine free cortisol levels were normal; thyroid function tests, testosterone, DHEA-sulfate, IGF-I and 17 hydroxyprogesterone has been normal).  Based on this, the patient's presentation is most consistent with PCOS.  Following in OB/GYN for assistance with reproductive therapies (now on Clomid); would continue metformin (potentially through her pregnancy if her obstetrician is in agreement).  We discussed potential for progression of hyperglycemia in pregnancy and importance of monitoring for gestational diabetes in pregnancy.  She is also interested in working on weight management to address metabolic sequelae of PCOS: I will refer to weight management clinic.      2.  Hyperprolactinemia.  Normalized on subsequent checks.  No additional intervention.    3.  High risk for diabetes.  Tolerating metformin extended release: Continue without changes.  Check A1c prior to next visit.    4.  Infertility.  See discussion above.    PLAN  -Continue metformin extended release 2000 mg daily  -Move forward with treatment as prescribed in OB/GYN  -Referral to weight management clinic placed  -Update me if pregnancy is suspected  -Return for a follow-up visit in 2 months, with labs before visit  -We will communicate results via [employer], or if needed by phone      Orders Placed This Encounter   Procedures     Basic metabolic panel     Hemoglobin A1c     Adult Comprehensive Weight Management  Referral         I spent a total of 22 minutes on the date of encounter reviewing medical records, evaluating the patient, coordinating care and documenting in the EHR, as detailed above.      Maura Sotelo MD   Division of Diabetes, Endocrinology and Metabolism  Department of Medicine      cc: Dr. Rodriguez; Tita Stevenson PA-C          Again, thank you for allowing me to participate in  the care of your patient.        Sincerely,        MAYLIN Sotelo MD

## 2023-08-04 NOTE — PROGRESS NOTES
ENDOCRINOLOGY FOLLOW-UP        HISTORY OF PRESENT ILLNESS    Sakshi White is seen in follow-up.  Patient is accompanied by her partner to this visit.    After last visit, we transitioned to extended release metformin in the event that would be more tolerable for patient.  She notes that she has been tolerating this formulation without side effect.  She has adjusted dose up to goal dose of 2000 mg/day.    Has had visits with OB/GYN, saw Dr. Rodriguez earlier this month at [facility].  On progesterone and planning to start Clomid.  Had intermittent menstrual spotting before initiation of progesterone.    Has given some thought to our discussion about weight management: Interested in referral to weight management clinic.    Pertinent endocrine and related history:  1. PCOS. Diagnosis of PCOS made around age 18.  -Menarche was at age 11.  After onset of menarche, the patient recalls amenorrhea.  She was therefore prescribed OCP.  She had regular menstrual cycles while on OCP, although she took inconsistently.  -Hirsutism since around age 16.  Hair growth is primarily on the face and abdomen.  She removes hair by waxing every week.  -.  Has been trying to conceive since at least  and has not been on OCP since then.  Despite this, she has had amenorrhea.  She has been prescribed Provera in gynecology and takes this periodically: Endorses withdrawal bleeding with Provera.  -Was prescribed metformin (along with spironolactone) in the past.  Was not able to tolerate metformin in the past since it caused worsening of reflux symptoms.  -She recalls laparoscopy in  for unknown condition related to fallopian tubes.  -She was treated with letrozole without successful conception.  2.  Elevated prolactin.  Testing in OB/GYN  (in 2022) revealed mildly elevated prolactin: This prompted referral to endocrinology. Recheck prolactin was normal.  3.  Prediabetes.  Family history is notable for father and multiple paternal  relatives with diabetes.      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    PAST MEDICAL HISTORY  Past Medical History:   Diagnosis Date    Depression     talk therapy    PCOS (polycystic ovarian syndrome)     Secondary amenorrhea     Vaginitis     Varicella     as child       MEDICATIONS  Current Outpatient Medications   Medication Sig Dispense Refill    diphenhydrAMINE (BENADRYL) 25 MG capsule Take 50 mg by mouth nightly as needed for itching or allergies      famotidine (PEPCID) 20 MG tablet Take 1 tablet (20 mg) by mouth daily as needed (for heartburn) 90 tablet 0    fluconazole (DIFLUCAN) 150 MG tablet  (Patient not taking: Reported on 6/16/2023)      ibuprofen (ADVIL/MOTRIN) 600 MG tablet Take 1 tablet (600 mg) by mouth every 6 hours as needed for moderate pain 60 tablet 0    medroxyPROGESTERone (PROVERA) 10 MG tablet Take 1 tablet (10 mg) by mouth daily PRN usually about 10 days per month 90 tablet 1    metFORMIN (GLUCOPHAGE XR) 500 MG 24 hr tablet Start 500 mg (1 tab) p.o. daily with a meal for 1 week; if tolerating, increase to 1000 mg (2 tabs) p.o. daily for 1 week; if tolerating, increase to 1500 mg (3 tabs) p.o. daily for 1 week; if tolerating, increase to 2000 mg (4 tablets) p.o. daily and continue at this dose for the long-term.  Take with food.  If side effect with a dose increase, reduce back to the last dose tolerated. 120 tablet 4    omeprazole (PRILOSEC) 20 MG DR capsule Take 1 capsule (20 mg) by mouth daily 90 capsule 3       Allergies, family, and social history were reviewed and documented as needed in EHR.     REVIEW OF SYSTEMS  A focused ROS was performed, with pertinent positives and negatives as noted in the HPI.    PHYSICAL EXAM  /70 (BP Location: Right arm, Patient Position: Sitting, Cuff Size: Adult Large)   Pulse 83   Wt 86 kg (189 lb 8 oz)   SpO2 98%   BMI 34.66 kg/m    Body mass index is 34.66 kg/m .  Constitutional:  Vital signs reviewed, as recorded above. Patient is alert, oriented and in no acute distress.  Eyes: PER, EOMI, no stare, lid lag, or retraction; no conjunctival injection.  Respiratory: Normal chest wall motion and respiratory effort.  MSK: No clubbing or cyanosis; normal muscle bulk and tone.  Skin: No lesions on visible skin,  Neurological: Alert and oriented times 3. No tremor.        DATA REVIEW  Each of the following laboratory and/or imaging studies were reviewed.      Component      Latest Ref Rng 7/13/2023  8:18 AM   Sodium      136 - 145 mmol/L 139    Potassium      3.4 - 5.3 mmol/L 4.0    Chloride      98 - 107 mmol/L 103    Carbon Dioxide (CO2)      22 - 29 mmol/L 27    Anion Gap      7 - 15 mmol/L 9    Urea Nitrogen      6.0 - 20.0 mg/dL 7.1    Creatinine      0.51 - 0.95 mg/dL 0.77    Calcium      8.6 - 10.0 mg/dL 9.2    Glucose      70 - 99 mg/dL 101 (H)    Alkaline Phosphatase      35 - 104 U/L 46    AST      0 - 45 U/L 18    ALT      0 - 50 U/L 7    Protein Total      6.4 - 8.3 g/dL 6.9    Albumin      3.5 - 5.2 g/dL 4.0    Bilirubin Total      <=1.2 mg/dL 0.2    GFR Estimate      >60 mL/min/1.73m2 >90    Prolactin      5 - 23 ng/mL    Hemoglobin A1C      0.0 - 5.6 % 5.8 (H)    TSH      0.30 - 4.20 uIU/mL 2.12       Legend:  (H) High    Component      Latest Ref Rng 6/16/2023  4:43 PM   Prolactin      5 - 23 ng/mL 10            ASSESSMENT  1.  History of irregular menstrual cycles/amenorrhea.  Prior diagnosis of PCOS.  There was a new finding of hyperprolactinemia in 8/2022, with normal prolactin on recheck.  Remainder of work-up for endocrinopathies which could have a similar presentation as PCOS has otherwise been unremarkable: Specifically, no evidence of cortisol excess (although higher than expected dexamethasone levels on DST with normal cortisol response, 24-hour urine free cortisol levels were normal; thyroid function tests, testosterone, DHEA-sulfate, IGF-I and 17 hydroxyprogesterone has  been normal).  Based on this, the patient's presentation is most consistent with PCOS.  Following in OB/GYN for assistance with reproductive therapies (now on Clomid); would continue metformin (potentially through her pregnancy if her obstetrician is in agreement).  We discussed potential for progression of hyperglycemia in pregnancy and importance of monitoring for gestational diabetes in pregnancy.  She is also interested in working on weight management to address metabolic sequelae of PCOS: I will refer to weight management clinic.      2.  Hyperprolactinemia.  Normalized on subsequent checks.  No additional intervention.    3.  High risk for diabetes.  Tolerating metformin extended release: Continue without changes.  Check A1c prior to next visit.    4.  Infertility.  See discussion above.    PLAN  -Continue metformin extended release 2000 mg daily  -Move forward with treatment as prescribed in OB/GYN  -Referral to weight management clinic placed  -Update me if pregnancy is suspected  -Return for a follow-up visit in 2 months, with labs before visit  -We will communicate results via [employer], or if needed by phone      Orders Placed This Encounter   Procedures    Basic metabolic panel    Hemoglobin A1c    Adult Comprehensive Weight Management  Referral         I spent a total of 22 minutes on the date of encounter reviewing medical records, evaluating the patient, coordinating care and documenting in the EHR, as detailed above.      Maura Sotelo MD   Division of Diabetes, Endocrinology and Metabolism  Department of Medicine      cc: Dr. Rodriguez; Tita Stevenson PA-C

## 2023-08-04 NOTE — PATIENT INSTRUCTIONS
-Continue metformin extended release 2000 mg daily  -Move forward with treatment as prescribed in OB/GYN  -Referral to weight management clinic placed  -Update me if pregnancy is suspected  -Return for a follow-up visit in 2 months, with labs before visit  -We will communicate results via [employer], or if needed by phone

## 2023-09-05 ENCOUNTER — LAB REQUISITION (OUTPATIENT)
Dept: LAB | Facility: CLINIC | Age: 28
End: 2023-09-05

## 2023-09-05 DIAGNOSIS — N97.9 FEMALE INFERTILITY, UNSPECIFIED: ICD-10-CM

## 2023-09-05 LAB — PROGEST SERPL-MCNC: 0.2 NG/ML

## 2023-09-05 PROCEDURE — 84144 ASSAY OF PROGESTERONE: CPT | Performed by: OBSTETRICS & GYNECOLOGY

## 2023-09-06 ASSESSMENT — PATIENT HEALTH QUESTIONNAIRE - PHQ9: SUM OF ALL RESPONSES TO PHQ QUESTIONS 1-9: 4

## 2023-09-07 ENCOUNTER — OFFICE VISIT (OUTPATIENT)
Dept: FAMILY MEDICINE | Facility: CLINIC | Age: 28
End: 2023-09-07
Payer: COMMERCIAL

## 2023-09-07 VITALS
TEMPERATURE: 97.6 F | HEART RATE: 82 BPM | WEIGHT: 191 LBS | RESPIRATION RATE: 20 BRPM | HEIGHT: 63 IN | BODY MASS INDEX: 33.84 KG/M2 | DIASTOLIC BLOOD PRESSURE: 78 MMHG | OXYGEN SATURATION: 97 % | SYSTOLIC BLOOD PRESSURE: 114 MMHG

## 2023-09-07 DIAGNOSIS — R39.15 URINARY URGENCY: ICD-10-CM

## 2023-09-07 DIAGNOSIS — Z11.3 SCREEN FOR STD (SEXUALLY TRANSMITTED DISEASE): ICD-10-CM

## 2023-09-07 DIAGNOSIS — M79.652 PAIN IN BOTH THIGHS: Primary | ICD-10-CM

## 2023-09-07 DIAGNOSIS — B37.31 YEAST INFECTION OF THE VAGINA: ICD-10-CM

## 2023-09-07 DIAGNOSIS — M79.651 PAIN IN BOTH THIGHS: Primary | ICD-10-CM

## 2023-09-07 LAB
BACTERIA #/AREA URNS HPF: ABNORMAL /HPF
HCG UR QL: NEGATIVE
RBC #/AREA URNS AUTO: ABNORMAL /HPF
SQUAMOUS #/AREA URNS AUTO: ABNORMAL /LPF
WBC #/AREA URNS AUTO: ABNORMAL /HPF

## 2023-09-07 PROCEDURE — 87086 URINE CULTURE/COLONY COUNT: CPT | Performed by: PHYSICIAN ASSISTANT

## 2023-09-07 PROCEDURE — 86780 TREPONEMA PALLIDUM: CPT | Performed by: PHYSICIAN ASSISTANT

## 2023-09-07 PROCEDURE — 87591 N.GONORRHOEAE DNA AMP PROB: CPT | Performed by: PHYSICIAN ASSISTANT

## 2023-09-07 PROCEDURE — 80048 BASIC METABOLIC PNL TOTAL CA: CPT | Performed by: PHYSICIAN ASSISTANT

## 2023-09-07 PROCEDURE — 86695 HERPES SIMPLEX TYPE 1 TEST: CPT | Performed by: PHYSICIAN ASSISTANT

## 2023-09-07 PROCEDURE — 81015 MICROSCOPIC EXAM OF URINE: CPT | Performed by: PHYSICIAN ASSISTANT

## 2023-09-07 PROCEDURE — 87491 CHLMYD TRACH DNA AMP PROBE: CPT | Performed by: PHYSICIAN ASSISTANT

## 2023-09-07 PROCEDURE — 87389 HIV-1 AG W/HIV-1&-2 AB AG IA: CPT | Performed by: PHYSICIAN ASSISTANT

## 2023-09-07 PROCEDURE — 99214 OFFICE O/P EST MOD 30 MIN: CPT | Performed by: PHYSICIAN ASSISTANT

## 2023-09-07 PROCEDURE — 81025 URINE PREGNANCY TEST: CPT | Performed by: PHYSICIAN ASSISTANT

## 2023-09-07 PROCEDURE — 36415 COLL VENOUS BLD VENIPUNCTURE: CPT | Performed by: PHYSICIAN ASSISTANT

## 2023-09-07 PROCEDURE — 82550 ASSAY OF CK (CPK): CPT | Performed by: PHYSICIAN ASSISTANT

## 2023-09-07 PROCEDURE — 86696 HERPES SIMPLEX TYPE 2 TEST: CPT | Performed by: PHYSICIAN ASSISTANT

## 2023-09-07 RX ORDER — FLUCONAZOLE 150 MG/1
TABLET ORAL
Qty: 2 TABLET | Refills: 0 | Status: SHIPPED | OUTPATIENT
Start: 2023-09-07 | End: 2023-09-18

## 2023-09-07 RX ORDER — CYCLOBENZAPRINE HCL 10 MG
5-10 TABLET ORAL 3 TIMES DAILY PRN
Qty: 25 TABLET | Refills: 0 | Status: SHIPPED | OUTPATIENT
Start: 2023-09-07 | End: 2023-09-18

## 2023-09-07 RX ORDER — CEFADROXIL 500 MG/1
1000 CAPSULE ORAL 2 TIMES DAILY
Qty: 40 CAPSULE | Refills: 0 | Status: SHIPPED | OUTPATIENT
Start: 2023-09-07 | End: 2023-09-11

## 2023-09-07 ASSESSMENT — PAIN SCALES - GENERAL: PAINLEVEL: MODERATE PAIN (4)

## 2023-09-07 ASSESSMENT — PATIENT HEALTH QUESTIONNAIRE - PHQ9
10. IF YOU CHECKED OFF ANY PROBLEMS, HOW DIFFICULT HAVE THESE PROBLEMS MADE IT FOR YOU TO DO YOUR WORK, TAKE CARE OF THINGS AT HOME, OR GET ALONG WITH OTHER PEOPLE: NOT DIFFICULT AT ALL
SUM OF ALL RESPONSES TO PHQ QUESTIONS 1-9: 4

## 2023-09-07 NOTE — PATIENT INSTRUCTIONS
Mat Figueroa,    Thank you for allowing [facility] to manage your care.    I am unsure of the cause of your symptoms, but we will see what our workup shows.     If you develop worsening/changing symptoms at any time, please call 911 or go to the emergency department for evaluation as we discussed.    I ordered some lab work. Please go to the laboratory to get your studies.    I sent your prescriptions to your pharmacy. Take a probiotic pill, eat live culture yogurt (Greek yogurt or Activia), drink kefir daily for one week after finishing the antibiotics to encourage growth of good bacteria in your system.    Drink 8-10 glasses of fluid daily to stay well-hydrated.    Please allow 1-2 business days for our office to contact you in regards to your laboratory/radiological studies.  If not done so, I encourage you to login into [employer] ([email or web address] to review your results as well.     For your pain, please use Tylenol 650mg every 6 hours. You may use 400mg of ibuprofen between doses of Tylenol.     Max acetaminophen (Tylenol) 4,000mg/24 hours  Max ibuprofen 3,000mg/24 hours    For severe pain not controlled by over the counter medications, please use cyclobenzaprine as prescribed. Do not use this medication while driving, operating machinery, with other sedating medications, or while drinking alcohol as it will make you drowsy.    If you have any questions or concerns, please feel free to call us at [phone]    Sincerely,    Edgardo Bonilla PA-C    Did you know?      You can schedule a video visit for follow-up appointments as well as future appointments for certain conditions.  Please see the below link.     [email or web address]    If you have not already done so,  I encourage you to sign up for [employer] ([email or web address]).  This will allow you to review your results, securely communicate with a provider, and schedule virtual  visits as well.

## 2023-09-07 NOTE — PROGRESS NOTES
Assessment & Plan   Problem List Items Addressed This Visit    None  Visit Diagnoses       Pain in both thighs    -  Primary    Relevant Medications    cyclobenzaprine (FLEXERIL) 10 MG tablet    Other Relevant Orders    CK total    UA Microscopic with Reflex to Culture (Completed)    Urinary urgency        Relevant Medications    cefadroxil (DURICEF) 500 MG capsule    Other Relevant Orders    Basic metabolic panel  (Ca, Cl, CO2, Creat, Gluc, K, Na, BUN)    UA Macroscopic with reflex to Microscopic and Culture - Lab Collect    NEISSERIA GONORRHOEA PCR    CHLAMYDIA TRACHOMATIS PCR    HCG Qual, Urine ([id number]) (Completed)    Treponema Abs w Reflex to RPR and Titer    UA Microscopic with Reflex to Culture (Completed)    Screen for STD (sexually transmitted disease)        Relevant Orders    NEISSERIA GONORRHOEA PCR    CHLAMYDIA TRACHOMATIS PCR    HIV Antigen Antibody Combo    Herpes Simplex Virus 1 and 2 IgG    UA Microscopic with Reflex to Culture (Completed)    Yeast infection of the vagina        Relevant Medications    fluconazole (DIFLUCAN) 150 MG tablet    Other Relevant Orders    UA Microscopic with Reflex to Culture (Completed)           Sakshi White is a 28 year old female here with urinary urgency, frequency and low back pain.    Exam without any abdominal or CVAT. Pt is afebrile & has not been vomiting. UA is equivocal for UTI. Will treat with cefadroxil as above empirically for pyelo. UC sent. Also given Diflucan should she develop a vaginal yeast infection. Interested in STI screening today as well. No known exposures.  Will also do a CK and chem considering her thigh pain. Would not be surprised if CK elevated, but would not be concerned unless there was concurrent DARRON. Encouraged pushing fluids, otc analgesics and Flexeril for breakthrough pain. FU with PCP or myself in 4-5 days for recheck as needed. If symptoms worsen, develop back pain/fevers/vomiting go to ER for further treatment.     Complete  "history and physical exam as below. Afebrile with normal vital signs.    DDx and Dx discussed with and explained to the pt to their satisfaction.  All questions were answered at this time. Pt expressed understanding of and agreement with this dx, tx, and plan. No further workup warranted and standard medication warnings given. I have given the patient a list of pertinent indications for re-evaluation. Will go to the Emergency Department if symptoms worsen or new concerning symptoms arise. Patient left in no apparent distress.     Ordering of each unique test  Prescription drug management  32 minutes spent by me on the date of the encounter doing chart review, history and exam, documentation and further activities per the note     BMI:   Estimated body mass index is 34.36 kg/m  as calculated from the following:    Height as of this encounter: 1.588 m (5' 2.52\").    Weight as of this encounter: 86.6 kg (191 lb).     See Patient Instructions    KATT De La Rosa [facility] SAM Figueroa is a 28 year old, presenting for the following health issues:  Musculoskeletal Problem and Urinary Frequency        9/7/2023     9:39 AM   Additional Questions   Roomed by dinh koroma   Accompanied by none         9/7/2023     9:39 AM   Patient Reported Additional Medications   Patient reports taking the following new medications none       History of Present Illness       Reason for visit:  Lower back and hamstring pain  Symptom onset:  1-3 days ago  Symptoms include:  Pain with movements  Symptom intensity:  Severe  Symptom progression:  Worsening  Had these symptoms before:  No  What makes it worse:  Moving  What makes it better:  Nothing    She eats 0-1 servings of fruits and vegetables daily.She consumes 1 sweetened beverage(s) daily.She exercises with enough effort to increase her heart rate 30 to 60 minutes per day.  She exercises with enough effort to increase her heart rate 4 days per week. She is " "missing 1 dose(s) of medications per week.  She is not taking prescribed medications regularly due to side effects.     Low back pain. Also has pain in posterior thighs, though she did a leg workout a few days ago. AZO made the pain feel better last night in the back. Urgency and frequency of urination. No dysuria. Maybe had these symptoms two. Mild nausea and hot flashes. Needs doctors note saying she was here today. Unable to do UA. No saddle anesthesia or incontinence.     Review of Systems   Constitutional, HEENT, cardiovascular, pulmonary, gi and gu systems are negative, except as otherwise noted.      Objective    /78   Pulse 82   Temp 97.6  F (36.4  C) (Temporal)   Resp 20   Ht 1.588 m (5' 2.52\")   Wt 86.6 kg (191 lb)   LMP 08/15/2023 (Exact Date)   SpO2 97%   Breastfeeding No   BMI 34.36 kg/m    Body mass index is 34.36 kg/m .  Physical Exam  Vitals and nursing note reviewed.   Constitutional:       General: She is not in acute distress.     Appearance: She is not ill-appearing or diaphoretic.   HENT:      Head: Normocephalic and atraumatic.      Mouth/Throat:      Mouth: Mucous membranes are moist.   Eyes:      Conjunctiva/sclera: Conjunctivae normal.   Cardiovascular:      Rate and Rhythm: Normal rate and regular rhythm.      Heart sounds: Normal heart sounds. No murmur heard.     No friction rub. No gallop.   Pulmonary:      Effort: Pulmonary effort is normal. No respiratory distress.      Breath sounds: Normal breath sounds. No stridor. No wheezing, rhonchi or rales.   Abdominal:      General: Bowel sounds are normal. There is no distension.      Palpations: Abdomen is soft. There is no mass.      Tenderness: There is no abdominal tenderness. There is no right CVA tenderness, left CVA tenderness, guarding or rebound.      Hernia: No hernia is present.   Musculoskeletal:      Comments: BLEs: tenderness to thighs.  No overlying signs of trauma or infection per patient. Distal CMS intact. " Remainder of limbs non-tender.     No CVA or midline spinal tenderness.     Skin:     General: Skin is warm and dry.   Neurological:      General: No focal deficit present.      Mental Status: She is alert. Mental status is at baseline.      Comments: Able to toe lift, heel walk and knee bend.   Psychiatric:         Mood and Affect: Mood normal.         Behavior: Behavior normal.          Results for orders placed or performed in visit on 09/07/23   HCG Qual, Urine ([id number])     Status: Normal   Result Value Ref Range    hCG Urine Qualitative Negative Negative   UA Microscopic with Reflex to Culture     Status: Abnormal   Result Value Ref Range    Bacteria Urine Moderate (A) None Seen /HPF    RBC Urine 0-2 0-2 /HPF /HPF    WBC Urine 0-5 0-5 /HPF /HPF    Squamous Epithelials Urine Moderate (A) None Seen /LPF    Narrative    Urine Culture not indicated

## 2023-09-08 ENCOUNTER — TELEPHONE (OUTPATIENT)
Dept: FAMILY MEDICINE | Facility: CLINIC | Age: 28
End: 2023-09-08
Payer: COMMERCIAL

## 2023-09-08 LAB
ANION GAP SERPL CALCULATED.3IONS-SCNC: 13 MMOL/L (ref 7–15)
BUN SERPL-MCNC: 7.4 MG/DL (ref 6–20)
C TRACH DNA SPEC QL NAA+PROBE: NEGATIVE
CALCIUM SERPL-MCNC: 9.4 MG/DL (ref 8.6–10)
CHLORIDE SERPL-SCNC: 104 MMOL/L (ref 98–107)
CK SERPL-CCNC: ABNORMAL U/L (ref 26–192)
CREAT SERPL-MCNC: 0.72 MG/DL (ref 0.51–0.95)
DEPRECATED HCO3 PLAS-SCNC: 22 MMOL/L (ref 22–29)
EGFRCR SERPLBLD CKD-EPI 2021: >90 ML/MIN/1.73M2
GLUCOSE SERPL-MCNC: 90 MG/DL (ref 70–99)
HIV 1+2 AB+HIV1 P24 AG SERPL QL IA: NONREACTIVE
HSV1 IGG SERPL QL IA: 2.69 INDEX
HSV1 IGG SERPL QL IA: ABNORMAL
HSV2 IGG SERPL QL IA: 4.23 INDEX
HSV2 IGG SERPL QL IA: ABNORMAL
N GONORRHOEA DNA SPEC QL NAA+PROBE: NEGATIVE
POTASSIUM SERPL-SCNC: 4 MMOL/L (ref 3.4–5.3)
SODIUM SERPL-SCNC: 139 MMOL/L (ref 136–145)
T PALLIDUM AB SER QL: NONREACTIVE

## 2023-09-08 NOTE — TELEPHONE ENCOUNTER
----- Message from KATT De La Rosa sent at 9/8/2023 12:37 PM CDT -----  Please call patient. 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 She has no STI symptoms currently, but wanted STI testing. If these are new diagnoses to her, please report to MD. HSV2 can be dormant, so it could have been from a previous partner or by a partner who did not have symptoms. It is a lifelong infection and only needs treatment when there is an outbreak. Thanks.

## 2023-09-08 NOTE — TELEPHONE ENCOUNTER
[facility] lab calling with critical lab     CK total:  38,071.     Gali Lynn RN on 9/8/2023 at 8:26 AM

## 2023-09-08 NOTE — TELEPHONE ENCOUNTER
I see patient is currently admitted.    Will follow up on this result note on Monday.    Carie TEJEDA RN  [facility] Triage

## 2023-09-08 NOTE — TELEPHONE ENCOUNTER
I also see very elevated CK; message sent to Edgardo Bonilla who states he discussed this with patient and she is in the ER now.    We will still need to call her regarding the HSV issue.  Phone encounter created for that, will call another time (if she is busy in ER now).  Carie TEJEDA RN  [facility] Triage

## 2023-09-08 NOTE — TELEPHONE ENCOUNTER
Called patient x 3 and left voice message x 1 instructing her to go to the nearest emergency department for reevaluation given her elevated CK on yesterday's blood work.  This is likely exertional rhabdomyolysis given she worked out earlier in the week before this pain began.  Blood chemistry was reassuring with no renal insufficiency.  She gave me permission to leave a detailed voice message yesterday.  I also called her emergency contact, her mother Idalmis and left a nonspecific voice message for her to contact me and her daughter.  My personal office number and the clinic number were given in both voice messages.

## 2023-09-08 NOTE — TELEPHONE ENCOUNTER
Patient returned my call and feels that her back pain has improved since yesterday, but she continues to have significant thigh pain. She will have her boyfriend drive her to [facility] for evaluation. Sounds well and non-toxic. I spoke with Tanya, charge RN who will expect the patient.

## 2023-09-09 LAB — BACTERIA UR CULT: NORMAL

## 2023-09-11 DIAGNOSIS — R39.15 URINARY URGENCY: Primary | ICD-10-CM

## 2023-09-11 DIAGNOSIS — R10.9 FLANK PAIN: ICD-10-CM

## 2023-09-11 RX ORDER — SULFAMETHOXAZOLE/TRIMETHOPRIM 800-160 MG
1 TABLET ORAL 2 TIMES DAILY
Qty: 12 TABLET | Refills: 0 | Status: SHIPPED | OUTPATIENT
Start: 2023-09-11 | End: 2023-09-17

## 2023-09-11 NOTE — TELEPHONE ENCOUNTER
This has been addressed via 20:20 Mobile 9/11/23 sent from pt. RN unable to find HSV 1 or 2 MD form for reportable diseases.     Routing to ordering provider to advise.    Jayleen Apodaca RN on 9/11/2023 at 1:25 PM

## 2023-09-12 ENCOUNTER — MYC MEDICAL ADVICE (OUTPATIENT)
Dept: FAMILY MEDICINE | Facility: CLINIC | Age: 28
End: 2023-09-12
Payer: COMMERCIAL

## 2023-09-15 DIAGNOSIS — K21.9 GASTROESOPHAGEAL REFLUX DISEASE WITHOUT ESOPHAGITIS: ICD-10-CM

## 2023-09-15 RX ORDER — FAMOTIDINE 20 MG/1
TABLET, FILM COATED ORAL
Qty: 90 TABLET | Refills: 0 | Status: SHIPPED | OUTPATIENT
Start: 2023-09-15 | End: 2023-12-14

## 2023-09-18 ENCOUNTER — OFFICE VISIT (OUTPATIENT)
Dept: FAMILY MEDICINE | Facility: CLINIC | Age: 28
End: 2023-09-18
Payer: COMMERCIAL

## 2023-09-18 VITALS
SYSTOLIC BLOOD PRESSURE: 102 MMHG | TEMPERATURE: 97 F | WEIGHT: 192.8 LBS | BODY MASS INDEX: 34.16 KG/M2 | HEART RATE: 84 BPM | DIASTOLIC BLOOD PRESSURE: 68 MMHG | OXYGEN SATURATION: 96 % | HEIGHT: 63 IN | RESPIRATION RATE: 14 BRPM

## 2023-09-18 DIAGNOSIS — M62.82 NON-TRAUMATIC RHABDOMYOLYSIS: Primary | ICD-10-CM

## 2023-09-18 LAB
ANION GAP SERPL CALCULATED.3IONS-SCNC: 10 MMOL/L (ref 7–15)
BUN SERPL-MCNC: 11.1 MG/DL (ref 6–20)
CALCIUM SERPL-MCNC: 9.2 MG/DL (ref 8.6–10)
CHLORIDE SERPL-SCNC: 105 MMOL/L (ref 98–107)
CK SERPL-CCNC: 312 U/L (ref 26–192)
CREAT SERPL-MCNC: 0.69 MG/DL (ref 0.51–0.95)
DEPRECATED HCO3 PLAS-SCNC: 23 MMOL/L (ref 22–29)
EGFRCR SERPLBLD CKD-EPI 2021: >90 ML/MIN/1.73M2
GLUCOSE SERPL-MCNC: 93 MG/DL (ref 70–99)
POTASSIUM SERPL-SCNC: 3.9 MMOL/L (ref 3.4–5.3)
SODIUM SERPL-SCNC: 138 MMOL/L (ref 136–145)

## 2023-09-18 PROCEDURE — 99213 OFFICE O/P EST LOW 20 MIN: CPT | Performed by: PHYSICIAN ASSISTANT

## 2023-09-18 PROCEDURE — 82550 ASSAY OF CK (CPK): CPT | Performed by: PHYSICIAN ASSISTANT

## 2023-09-18 PROCEDURE — 36415 COLL VENOUS BLD VENIPUNCTURE: CPT | Performed by: PHYSICIAN ASSISTANT

## 2023-09-18 PROCEDURE — 80048 BASIC METABOLIC PNL TOTAL CA: CPT | Performed by: PHYSICIAN ASSISTANT

## 2023-09-18 ASSESSMENT — PAIN SCALES - GENERAL: PAINLEVEL: NO PAIN (0)

## 2023-09-18 NOTE — PROGRESS NOTES
"  Assessment & Plan   Problem List Items Addressed This Visit    None  Visit Diagnoses       Non-traumatic rhabdomyolysis    -  Primary    Relevant Orders    CK total    Basic metabolic panel  (Ca, Cl, CO2, Creat, Gluc, K, Na, BUN)           Likely resolving nontraumatic rhabdomyolysis.  We discussed follow-up blood work and she would like to proceed with a repeat CK and BMP.  I feel this is reasonable.  She is tolerating p.o. and continue to push p.o. fluids.  Her symptoms have greatly improved and anticipate the labs above will be reassuring.  We discussed herpes simplex infection and how this can go asymptomatic in some individuals making it hard to know who she became infected from or when.  Discussed safe sex practices. No current HSV symptoms. Follow-up as needed.    Complete history and physical exam as below. Afebrile with normal vital signs.    DDx and Dx discussed with and explained to the pt to their satisfaction.  All questions were answered at this time. Pt expressed understanding of and agreement with this dx, tx, and plan. No further workup warranted and standard medication warnings given. I have given the patient a list of pertinent indications for re-evaluation. Will go to the Emergency Department if symptoms worsen or new concerning symptoms arise. Patient left in no apparent distress.     Ordering of each unique test  22 minutes spent by me on the date of the encounter doing chart review, history and exam, documentation and further activities per the note     MED REC REQUIRED  Post Medication Reconciliation Status: discharge medications reconciled, continue medications without change  BMI:   Estimated body mass index is 34.08 kg/m  as calculated from the following:    Height as of this encounter: 1.602 m (5' 3.07\").    Weight as of this encounter: 87.5 kg (192 lb 12.8 oz).     See Patient Instructions    KATT De La Rosa  [facility] SAM Figueroa is a 28 year old, " "presenting for the following health issues:  Hospital F/U        9/18/2023     9:54 AM   Additional Questions   Roomed by dinh koroma   Accompanied by none         9/18/2023     9:54 AM   Patient Reported Additional Medications   Patient reports taking the following new medications none       HPI       Hospital Follow-up Visit:    Hospital/Nursing Home/IP Rehab Facility:  [facility]  Date of Admission: 9/8/23  Date of Discharge: 9/8/23  Reason(s) for Admission: Abnormal lab results    Was your hospitalization related to COVID-19? No   Problems taking medications regularly:  None  Medication changes since discharge: None  Problems adhering to non-medication therapy:  None    Summary of hospitalization:  CareEverywhere information obtained and reviewed. Sent to ER after clinic visit showed non-traumatic rhabdomyolysis and HSV1/2 infection, likely latent. Received IV NS and CK improved. No DARRON.  Diagnostic Tests/Treatments reviewed.  Follow up needed: CK and BMP  Other Healthcare Providers Involved in Patient s Care:         None  Update since discharge: much improved. Mild discomfort with stretching in BLEs, no urinary symptoms or flank pain.    Plan of care communicated with patient    Review of Systems   Constitutional, HEENT, cardiovascular, pulmonary, gi and gu systems are negative, except as otherwise noted.      Objective    /68   Pulse 84   Temp 97  F (36.1  C) (Temporal)   Resp 14   Ht 1.602 m (5' 3.07\")   Wt 87.5 kg (192 lb 12.8 oz)   LMP 08/15/2023 (Exact Date)   SpO2 96%   Breastfeeding No   BMI 34.08 kg/m    Body mass index is 34.08 kg/m .  Physical Exam  Vitals and nursing note reviewed.   Constitutional:       General: She is not in acute distress.     Appearance: She is not ill-appearing or diaphoretic.   HENT:      Head: Normocephalic and atraumatic.      Mouth/Throat:      Mouth: Mucous membranes are moist.   Eyes:      Conjunctiva/sclera: Conjunctivae normal.   Cardiovascular:      " Rate and Rhythm: Normal rate and regular rhythm.      Heart sounds: Normal heart sounds. No murmur heard.     No friction rub. No gallop.      Comments: No LE edema or tenderness.      Pulmonary:      Effort: Pulmonary effort is normal. No respiratory distress.      Breath sounds: Normal breath sounds. No stridor. No wheezing, rhonchi or rales.   Abdominal:      Tenderness: There is no right CVA tenderness or left CVA tenderness.   Skin:     General: Skin is warm and dry.   Neurological:      General: No focal deficit present.      Mental Status: She is alert. Mental status is at baseline.   Psychiatric:         Mood and Affect: Mood normal.         Behavior: Behavior normal.          CK and BMP pending

## 2023-09-18 NOTE — PATIENT INSTRUCTIONS
Mat Figueroa,    Thank you for allowing [facility] to manage your care.    If you develop worsening/changing symptoms at any time, please call 911 or go to the emergency department for evaluation as we discussed.    I ordered some lab work. Please go to the laboratory to get your studies.    Please allow 1-2 business days for our office to contact you in regards to your laboratory/radiological studies.  If not done so, I encourage you to login into [employer] ([email or web address] to review your results as well.     Drink 8-10 glasses of fluid daily to stay well-hydrated.    If you have any questions or concerns, please feel free to call us at [phone]    Sincerely,    Edgardo Bonilla PA-C    Did you know?      You can schedule a video visit for follow-up appointments as well as future appointments for certain conditions.  Please see the below link.     [email or web address]    If you have not already done so,  I encourage you to sign up for [employer] ([email or web address]).  This will allow you to review your results, securely communicate with a provider, and schedule virtual visits as well.

## 2023-10-06 ENCOUNTER — LAB (OUTPATIENT)
Dept: LAB | Facility: CLINIC | Age: 28
End: 2023-10-06
Payer: COMMERCIAL

## 2023-10-06 DIAGNOSIS — E66.01 MORBID OBESITY (H): ICD-10-CM

## 2023-10-06 DIAGNOSIS — E28.2 POLYCYSTIC OVARY SYNDROME: ICD-10-CM

## 2023-10-06 LAB
ANION GAP SERPL CALCULATED.3IONS-SCNC: 11 MMOL/L (ref 7–15)
BUN SERPL-MCNC: 11.4 MG/DL (ref 6–20)
CALCIUM SERPL-MCNC: 9.7 MG/DL (ref 8.6–10)
CHLORIDE SERPL-SCNC: 105 MMOL/L (ref 98–107)
CREAT SERPL-MCNC: 0.72 MG/DL (ref 0.51–0.95)
DEPRECATED HCO3 PLAS-SCNC: 26 MMOL/L (ref 22–29)
EGFRCR SERPLBLD CKD-EPI 2021: >90 ML/MIN/1.73M2
GLUCOSE SERPL-MCNC: 88 MG/DL (ref 70–99)
HBA1C MFR BLD: 5.7 % (ref 0–5.6)
POTASSIUM SERPL-SCNC: 4 MMOL/L (ref 3.4–5.3)
SODIUM SERPL-SCNC: 142 MMOL/L (ref 135–145)

## 2023-10-06 PROCEDURE — 80048 BASIC METABOLIC PNL TOTAL CA: CPT

## 2023-10-06 PROCEDURE — 83036 HEMOGLOBIN GLYCOSYLATED A1C: CPT

## 2023-10-06 PROCEDURE — 36415 COLL VENOUS BLD VENIPUNCTURE: CPT

## 2023-10-09 ENCOUNTER — E-VISIT (OUTPATIENT)
Dept: FAMILY MEDICINE | Facility: CLINIC | Age: 28
End: 2023-10-09
Payer: COMMERCIAL

## 2023-10-09 DIAGNOSIS — N89.8 VAGINAL DISCHARGE: Primary | ICD-10-CM

## 2023-10-09 PROCEDURE — 99421 OL DIG E/M SVC 5-10 MIN: CPT | Performed by: PHYSICIAN ASSISTANT

## 2023-10-10 ENCOUNTER — LAB (OUTPATIENT)
Dept: LAB | Facility: CLINIC | Age: 28
End: 2023-10-10
Payer: COMMERCIAL

## 2023-10-10 DIAGNOSIS — N89.8 VAGINAL DISCHARGE: ICD-10-CM

## 2023-10-10 LAB
BACTERIAL VAGINOSIS VAG-IMP: NEGATIVE
CANDIDA DNA VAG QL NAA+PROBE: DETECTED
CANDIDA GLABRATA / CANDIDA KRUSEI DNA: NOT DETECTED
T VAGINALIS DNA VAG QL NAA+PROBE: NOT DETECTED

## 2023-10-10 PROCEDURE — 0352U MULTIPLEX VAGINAL PANEL BY PCR: CPT

## 2023-10-10 NOTE — PATIENT INSTRUCTIONS
Thank you for choosing us for your care. Given your symptoms, I would like you to do a lab-only visit to determine what is causing them.  I have placed the orders.  Please schedule an appointment with the lab right here in [facility], or call [phone].  I will let you know when the results are back and next steps to take.  Vaginitis: Care Instructions  Overview     Vaginitis is soreness or infection of the vagina. This common problem can cause itching and burning. And it can cause a change in vaginal discharge. Sometimes it can cause pain during sex. Vaginitis may be caused by bacteria, yeast, or other germs. Some infections that cause it are caught from a sexual partner. Bath products, spermicides, and douches can irritate the vagina too.  This problem can also happen during and after menopause. A drop in estrogen levels during this time can cause dryness, soreness, and pain during sex.  Your doctor can give you medicine to treat vaginitis. And home care may help you feel better. For certain types of infections, your sex partner(s) must be treated too.  Follow-up care is a key part of your treatment and safety. Be sure to make and go to all appointments, and call your doctor if you are having problems. It's also a good idea to know your test results and keep a list of the medicines you take.  How can you care for yourself at home?  Take your medicines exactly as prescribed. Call your doctor if you think you are having a problem with your medicine.  Ask your doctor if your sex partner(s) also needs treatment.  Do not eat or drink anything that has alcohol if you are taking metronidazole (Flagyl).  Wash your vulva daily with water. You also can use a mild, unscented soap if you want.  Do not use scented bath products. And do not use vaginal sprays or douches.  Change out of wet or damp clothes as soon as possible. Wear cotton underwear. And avoid tight clothing that could increase moisture.  Put a washcloth soaked  "in cool water on the area to relieve itching. Or you can take cool baths.  If you have dryness because of menopause, use estrogen cream or pills that your doctor prescribes.  Ask your doctor about when it is okay to have sex.  Use a personal lubricant before sex if you have dryness. Examples are Astroglide, K-Y Jelly, and Wet Lubricant Gel.  When should you call for help?   Call your doctor now or seek immediate medical care if:    You have a fever.     You have new or increased pain in your vagina or pelvis.     You have new or worse vaginal itching or discharge.   Watch closely for changes in your health, and be sure to contact your doctor if:    You have bleeding other than your period.     You do not get better as expected.   Where can you learn more?  Go to [email or web address]  Enter F219 in the search box to learn more about \"Vaginitis: Care Instructions.\"  Current as of: August 2, 2022               Content Version: 13.7    3523-1156 [employer].   Care instructions adapted under license by your healthcare professional. If you have questions about a medical condition or this instruction, always ask your healthcare professional. [employer] disclaims any warranty or liability for your use of this information.    "

## 2023-10-10 NOTE — TELEPHONE ENCOUNTER
Will treat based on results of MVP swab. Previous history of HSV2 and vaginal yeast infection. Will treat based on results. Otherwis, negative recent STI testing and no new partners/exposures.    Mychart E-Visit Vaginal Discharge Concerns    Question 10/9/2023  9:16 PM CDT - Filed by Patient   Pregnant? No   Do you have any of the following health problems/concerns? None of the above   Do you have Diabetes? No   When did your symptoms first start? In the last week   Which of the following are you experiencing? Vaginal itching    Vaginal discharge   Which of the following applies to your vaginal discharg? (I have no discharge, I have a clear discharge, I have a white/milky discharge, I have a yellow/green discharge, I have a foul-smelling discharge) I have a white/milky discharge   Which of the following are you experiencing? None of the above   Have you taken antibiotics in the last 30 days? No   Have you had similar symptoms in the past? No, I have never had these symptoms   During the past 2 months, have you had sexual contact with a new partner? No   Has any person with whom you have sexual contact been recently told they have a disease possibly acquired through sex? No   Have you in the last two months been diagnosed with a sexually transmitted infection (such as gonorrhea, chlmaydia, syphilis or HIV)? Yes   Please describe the infection and treatment and when it occurred Herpes   Is there any additional information you would like the provider to know?      Provider E-Visit time total (minutes): 7

## 2023-10-11 DIAGNOSIS — B37.31 YEAST INFECTION OF THE VAGINA: Primary | ICD-10-CM

## 2023-10-11 RX ORDER — FLUCONAZOLE 150 MG/1
TABLET ORAL
Qty: 2 TABLET | Refills: 0 | Status: SHIPPED | OUTPATIENT
Start: 2023-10-11 | End: 2023-11-15

## 2023-10-13 ENCOUNTER — OFFICE VISIT (OUTPATIENT)
Dept: ENDOCRINOLOGY | Facility: CLINIC | Age: 28
End: 2023-10-13
Payer: COMMERCIAL

## 2023-10-13 VITALS
SYSTOLIC BLOOD PRESSURE: 118 MMHG | OXYGEN SATURATION: 98 % | HEART RATE: 78 BPM | WEIGHT: 193.5 LBS | BODY MASS INDEX: 34.2 KG/M2 | DIASTOLIC BLOOD PRESSURE: 72 MMHG

## 2023-10-13 DIAGNOSIS — E28.2 POLYCYSTIC OVARY SYNDROME: Primary | ICD-10-CM

## 2023-10-13 DIAGNOSIS — N91.2 AMENORRHEA: ICD-10-CM

## 2023-10-13 PROCEDURE — 99214 OFFICE O/P EST MOD 30 MIN: CPT | Performed by: INTERNAL MEDICINE

## 2023-10-13 NOTE — PROGRESS NOTES
ENDOCRINOLOGY FOLLOW-UP        HISTORY OF PRESENT ILLNESS    Sakshi White is seen in follow-up.  Patient is accompanied by her partner to this visit.    Ms. White was seen in the ED on 9/8/2023 after she was evaluated in clinic for leg pain and found to have marked elevation in CK (38,000).  She had started exercising that week.  Since CK was trending down by the time of ED visit and renal function was normal, the patient was given IV fluids in the ED and discharged home with instructions to increase oral fluid intake.    She notes that she had not been exercising very intensely: Was walking and doing squats/weightlifting 3-4 times a week.    She has been keeping well-hydrated after the above incidents.  CK level had almost reached normal range earlier this month.    We will transitioned to extended release metformin 6/2023 and she had been tolerating 2000 mg daily when I saw her/2023.  However, she notes that she has not been taking metformin as consistently since her visit because she has not been making optimal dietary choices for snacking on refined foods) and was concerned that taking metformin without a full or nutritious meal.  Feels like if she is not working or going to classes, she often snacks.    Not having regular menstrual bleeding unless taking Provera.    Took Clomid in August, skipped in September.    We had referred to weight management clinic last visit in 8/2023: Because of transition and insurance plans, she has not been able to set up an appointment.  She wants to do so once changing insurance is finalized.    Pertinent endocrine and related history:  1. PCOS. Diagnosis of PCOS made around age 18.  -Menarche was at age 11.  After onset of menarche, the patient recalls amenorrhea.  She was therefore prescribed OCP.  She had regular menstrual cycles while on OCP, although she took inconsistently.  -Hirsutism since around age 16.  Hair growth is primarily on the face and abdomen.  She removes  hair by waxing every week.  -.  Has been trying to conceive since at least  and has not been on OCP since then.  Despite this, she has had amenorrhea.  She has been prescribed Provera in gynecology and takes this periodically: Endorses withdrawal bleeding with Provera.  -Was prescribed metformin (along with spironolactone) in the past.  Was not able to tolerate metformin in the past since it caused worsening of reflux symptoms.  -She recalls laparoscopy in  for unknown condition related to fallopian tubes.  -She was treated with letrozole without successful conception.  2.  Elevated prolactin.  Testing in OB/GYN  (in 2022) revealed mildly elevated prolactin: This prompted referral to endocrinology. Recheck prolactin was normal.  3.  Prediabetes.  Family history is notable for father and multiple paternal relatives with diabetes.      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    PAST MEDICAL HISTORY  Past Medical History:   Diagnosis Date    Depression     talk therapy    Depressive disorder     PCOS (polycystic ovarian syndrome)     Secondary amenorrhea     Vaginitis     Varicella     as child       MEDICATIONS  Current Outpatient Medications   Medication Sig Dispense Refill    clomiPHENE (CLOMID) 50 MG tablet       famotidine (PEPCID) 20 MG tablet TAKE 1 TABLET(20 MG) BY MOUTH DAILY AS NEEDED FOR HEARTBURN 90 tablet 0    fluconazole (DIFLUCAN) 150 MG tablet Take 1 tablet by mouth for vaginal yeast infection. May take second tablet if not improving in 48 hours. 2 tablet 0    ibuprofen (ADVIL/MOTRIN) 600 MG tablet Take 1 tablet (600 mg) by mouth every 6 hours as needed for moderate pain 60 tablet 0    medroxyPROGESTERone (PROVERA) 10 MG tablet Take 1 tablet (10 mg) by mouth daily PRN usually about 10 days per month 90 tablet 1    metFORMIN (GLUCOPHAGE XR) 500 MG 24 hr tablet Start 500 mg (1 tab) p.o. daily with a meal for 1 week; if  tolerating, increase to 1000 mg (2 tabs) p.o. daily for 1 week; if tolerating, increase to 1500 mg (3 tabs) p.o. daily for 1 week; if tolerating, increase to 2000 mg (4 tablets) p.o. daily and continue at this dose for the long-term.  Take with food.  If side effect with a dose increase, reduce back to the last dose tolerated. 120 tablet 4    omeprazole (PRILOSEC) 20 MG DR capsule Take 1 capsule (20 mg) by mouth daily 90 capsule 3       Allergies, family, and social history were reviewed and documented as needed in EHR.     REVIEW OF SYSTEMS  A focused ROS was performed, with pertinent positives and negatives as noted in the HPI.    PHYSICAL EXAM  LMP 08/15/2023 (Exact Date)   There is no height or weight on file to calculate BMI.  Constitutional: Vital signs reviewed, as recorded above. Patient is alert, oriented and appears in no acute distress.  Eyes: PER, EOMI, no stare, lid lag, or retraction; no conjunctival injection.  Respiratory: Normal chest wall motion and respiratory effort.  MSK: No clubbing or cyanosis; normal muscle bulk and tone.  Skin: No lesions on visible skin,  Neurological: Alert and oriented times 3. No tremor.        DATA REVIEW  Each of the following laboratory and/or imaging studies were reviewed.    Component      Latest Ref Rng 10/6/2023  1:30 PM   Sodium      135 - 145 mmol/L 142    Potassium      3.4 - 5.3 mmol/L 4.0    Chloride      98 - 107 mmol/L 105    Carbon Dioxide (CO2)      22 - 29 mmol/L 26    Anion Gap      7 - 15 mmol/L 11    Urea Nitrogen      6.0 - 20.0 mg/dL 11.4    Creatinine      0.51 - 0.95 mg/dL 0.72    GFR Estimate      >60 mL/min/1.73m2 >90    Calcium      8.6 - 10.0 mg/dL 9.7    Glucose      70 - 99 mg/dL 88    Hemoglobin A1C      0.0 - 5.6 % 5.7 (H)       Legend:  (H) High          ASSESSMENT  1.  PCOS.  There was a new finding of hyperprolactinemia in 8/2022, with normal prolactin on recheck.  Remainder of work-up for endocrinopathies which could have a similar  presentation as PCOS has otherwise been unremarkable: Specifically, no evidence of cortisol excess (although higher than expected dexamethasone levels on DST with normal cortisol response, 24-hour urine free cortisol levels were normal; thyroid function tests, testosterone, DHEA-sulfate, IGF-I and 17 hydroxyprogesterone has been normal).  Based on this, the patient's presentation is most consistent with PCOS.  Following in OB/GYN for assistance with reproductive therapies (now on Clomid); would resume metformin (potentially through her pregnancy if her obstetrician is in agreement).  Would need close follow-up for progression of hyperglycemia in pregnancy.  Follow-up with referral to weight management clinic once insurance plans are in place.  We discussed small changes to minimize nighttime snacking.    2.  Hyperprolactinemia.  Normalized on subsequent checks.  No additional intervention.    3.  High risk for diabetes.  A1c checked prior to this visit is subtly lower.  Resume metformin.    4.  Infertility.  See discussion above.    5.  Rhabdomyolysis.  In the context of exercise (although not significantly tense) to dehydration.  CK has trended down.  Renal function has been normal.  Check thyroid function tests with next lab draw to ensure hypothyroidism is not predisposing to rhabdomyolysis.    PLAN  -Labs in 1 month  -Restart metformin at 500 mg once daily and increase dose by 500 mg every week until you have reached goal of 2000 mg daily and then stay at that dose  -Once insurance transition finishes, reach out to weight management clinic to schedule visit  -Try working on no snacks after 8 PM  -Update me if pregnancy is suspected  -Return for a follow-up visit in 2 months  -We will communicate results via [employer], or if needed by phone      Orders Placed This Encounter   Procedures    TSH with free T4 reflex    CK total     I spent a total of 30 minutes on the date of encounter reviewing medical records,  evaluating the patient, coordinating care and documenting in the EHR, as detailed above.      Maura Sotelo MD   Division of Diabetes, Endocrinology and Metabolism  Department of Medicine

## 2023-10-13 NOTE — LETTER
10/13/2023         RE: Sakshi White  [id number] [address] [address]        Dear Colleague,    Thank you for referring your patient, Sakshi White, to the [facility]. Please see a copy of my visit note below.      ENDOCRINOLOGY FOLLOW-UP        HISTORY OF PRESENT ILLNESS    Sakshi White is seen in follow-up.  Patient is accompanied by her partner to this visit.    Ms. White was seen in the ED on 9/8/2023 after she was evaluated in clinic for leg pain and found to have marked elevation in CK (38,000).  She had started exercising that week.  Since CK was trending down by the time of ED visit and renal function was normal, the patient was given IV fluids in the ED and discharged home with instructions to increase oral fluid intake.    She notes that she had not been exercising very intensely: Was walking and doing squats/weightlifting 3-4 times a week.    She has been keeping well-hydrated after the above incidents.  CK level had almost reached normal range earlier this month.    We will transitioned to extended release metformin 6/2023 and she had been tolerating 2000 mg daily when I saw her/2023.  However, she notes that she has not been taking metformin as consistently since her visit because she has not been making optimal dietary choices for snacking on refined foods) and was concerned that taking metformin without a full or nutritious meal.  Feels like if she is not working or going to classes, she often snacks.    Not having regular menstrual bleeding unless taking Provera.    Took Clomid in August, skipped in September.    We had referred to weight management clinic last visit in 8/2023: Because of transition and insurance plans, she has not been able to set up an appointment.  She wants to do so once changing insurance is finalized.    Pertinent endocrine and related history:  1. PCOS. Diagnosis of PCOS made around age 18.  -Menarche was at age 11.  After onset of  menarche, the patient recalls amenorrhea.  She was therefore prescribed OCP.  She had regular menstrual cycles while on OCP, although she took inconsistently.  -Hirsutism since around age 16.  Hair growth is primarily on the face and abdomen.  She removes hair by waxing every week.  -.  Has been trying to conceive since at least  and has not been on OCP since then.  Despite this, she has had amenorrhea.  She has been prescribed Provera in gynecology and takes this periodically: Endorses withdrawal bleeding with Provera.  -Was prescribed metformin (along with spironolactone) in the past.  Was not able to tolerate metformin in the past since it caused worsening of reflux symptoms.  -She recalls laparoscopy in  for unknown condition related to fallopian tubes.  -She was treated with letrozole without successful conception.  2.  Elevated prolactin.  Testing in OB/GYN  (in 2022) revealed mildly elevated prolactin: This prompted referral to endocrinology. Recheck prolactin was normal.  3.  Prediabetes.  Family history is notable for father and multiple paternal relatives with diabetes.      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    PAST MEDICAL HISTORY  Past Medical History:   Diagnosis Date     Depression     talk therapy     Depressive disorder      PCOS (polycystic ovarian syndrome)      Secondary amenorrhea      Vaginitis      Varicella     as child       MEDICATIONS  Current Outpatient Medications   Medication Sig Dispense Refill     clomiPHENE (CLOMID) 50 MG tablet        famotidine (PEPCID) 20 MG tablet TAKE 1 TABLET(20 MG) BY MOUTH DAILY AS NEEDED FOR HEARTBURN 90 tablet 0     fluconazole (DIFLUCAN) 150 MG tablet Take 1 tablet by mouth for vaginal yeast infection. May take second tablet if not improving in 48 hours. 2 tablet 0     ibuprofen (ADVIL/MOTRIN) 600 MG tablet Take 1 tablet (600 mg) by mouth every 6 hours as needed for moderate  pain 60 tablet 0     medroxyPROGESTERone (PROVERA) 10 MG tablet Take 1 tablet (10 mg) by mouth daily PRN usually about 10 days per month 90 tablet 1     metFORMIN (GLUCOPHAGE XR) 500 MG 24 hr tablet Start 500 mg (1 tab) p.o. daily with a meal for 1 week; if tolerating, increase to 1000 mg (2 tabs) p.o. daily for 1 week; if tolerating, increase to 1500 mg (3 tabs) p.o. daily for 1 week; if tolerating, increase to 2000 mg (4 tablets) p.o. daily and continue at this dose for the long-term.  Take with food.  If side effect with a dose increase, reduce back to the last dose tolerated. 120 tablet 4     omeprazole (PRILOSEC) 20 MG DR capsule Take 1 capsule (20 mg) by mouth daily 90 capsule 3       Allergies, family, and social history were reviewed and documented as needed in EHR.     REVIEW OF SYSTEMS  A focused ROS was performed, with pertinent positives and negatives as noted in the HPI.    PHYSICAL EXAM  LMP 08/15/2023 (Exact Date)   There is no height or weight on file to calculate BMI.  Constitutional: Vital signs reviewed, as recorded above. Patient is alert, oriented and appears in no acute distress.  Eyes: PER, EOMI, no stare, lid lag, or retraction; no conjunctival injection.  Respiratory: Normal chest wall motion and respiratory effort.  MSK: No clubbing or cyanosis; normal muscle bulk and tone.  Skin: No lesions on visible skin,  Neurological: Alert and oriented times 3. No tremor.        DATA REVIEW  Each of the following laboratory and/or imaging studies were reviewed.    Component      Latest Ref Rng 10/6/2023  1:30 PM   Sodium      135 - 145 mmol/L 142    Potassium      3.4 - 5.3 mmol/L 4.0    Chloride      98 - 107 mmol/L 105    Carbon Dioxide (CO2)      22 - 29 mmol/L 26    Anion Gap      7 - 15 mmol/L 11    Urea Nitrogen      6.0 - 20.0 mg/dL 11.4    Creatinine      0.51 - 0.95 mg/dL 0.72    GFR Estimate      >60 mL/min/1.73m2 >90    Calcium      8.6 - 10.0 mg/dL 9.7    Glucose      70 - 99 mg/dL 88     Hemoglobin A1C      0.0 - 5.6 % 5.7 (H)       Legend:  (H) High          ASSESSMENT  1.  PCOS.  There was a new finding of hyperprolactinemia in 8/2022, with normal prolactin on recheck.  Remainder of work-up for endocrinopathies which could have a similar presentation as PCOS has otherwise been unremarkable: Specifically, no evidence of cortisol excess (although higher than expected dexamethasone levels on DST with normal cortisol response, 24-hour urine free cortisol levels were normal; thyroid function tests, testosterone, DHEA-sulfate, IGF-I and 17 hydroxyprogesterone has been normal).  Based on this, the patient's presentation is most consistent with PCOS.  Following in OB/GYN for assistance with reproductive therapies (now on Clomid); would resume metformin (potentially through her pregnancy if her obstetrician is in agreement).  Would need close follow-up for progression of hyperglycemia in pregnancy.  Follow-up with referral to weight management clinic once insurance plans are in place.  We discussed small changes to minimize nighttime snacking.    2.  Hyperprolactinemia.  Normalized on subsequent checks.  No additional intervention.    3.  High risk for diabetes.  A1c checked prior to this visit is subtly lower.  Resume metformin.    4.  Infertility.  See discussion above.    5.  Rhabdomyolysis.  In the context of exercise (although not significantly tense) to dehydration.  CK has trended down.  Renal function has been normal.  Check thyroid function tests with next lab draw to ensure hypothyroidism is not predisposing to rhabdomyolysis.    PLAN  -Labs in 1 month  -Restart metformin at 500 mg once daily and increase dose by 500 mg every week until you have reached goal of 2000 mg daily and then stay at that dose  -Once insurance transition finishes, reach out to weight management clinic to schedule visit  -Try working on no snacks after 8 PM  -Update me if pregnancy is suspected  -Return for a follow-up  visit in 2 months  -We will communicate results via [employer], or if needed by phone      Orders Placed This Encounter   Procedures     TSH with free T4 reflex     CK total     I spent a total of 30 minutes on the date of encounter reviewing medical records, evaluating the patient, coordinating care and documenting in the EHR, as detailed above.      Maylin Sotelo MD   Division of Diabetes, Endocrinology and Metabolism  Department of Medicine      Again, thank you for allowing me to participate in the care of your patient.        Sincerely,        MAYLIN Sotelo MD

## 2023-10-13 NOTE — PATIENT INSTRUCTIONS
-Labs in 1 month  -Restart metformin at 500 mg once daily and increase dose by 500 mg every week until you have reached goal of 2000 mg daily and then stay at that dose  -Once insurance transition finishes, reach out to weight management clinic to schedule visit  -Try working on no snacks after 8 PM  -Update me if pregnancy is suspected  -Return for a follow-up visit in 2 months  -We will communicate results via [employer], or if needed by phone

## 2023-11-15 ENCOUNTER — LAB (OUTPATIENT)
Dept: LAB | Facility: CLINIC | Age: 28
End: 2023-11-15
Payer: COMMERCIAL

## 2023-11-15 ENCOUNTER — VIRTUAL VISIT (OUTPATIENT)
Dept: FAMILY MEDICINE | Facility: CLINIC | Age: 28
End: 2023-11-15
Payer: COMMERCIAL

## 2023-11-15 DIAGNOSIS — N91.2 AMENORRHEA: ICD-10-CM

## 2023-11-15 DIAGNOSIS — E28.2 POLYCYSTIC OVARY SYNDROME: ICD-10-CM

## 2023-11-15 DIAGNOSIS — N89.8 VAGINAL DISCHARGE: Primary | ICD-10-CM

## 2023-11-15 DIAGNOSIS — Z86.19 HISTORY OF CANDIDIASIS OF VAGINA: ICD-10-CM

## 2023-11-15 PROCEDURE — 36415 COLL VENOUS BLD VENIPUNCTURE: CPT

## 2023-11-15 PROCEDURE — 99214 OFFICE O/P EST MOD 30 MIN: CPT | Mod: VID

## 2023-11-15 PROCEDURE — 82550 ASSAY OF CK (CPK): CPT

## 2023-11-15 PROCEDURE — 84443 ASSAY THYROID STIM HORMONE: CPT

## 2023-11-15 NOTE — PROGRESS NOTES
Carolina is a 28 year old who is being evaluated via a billable video visit.      How would you like to obtain your AVS? MyChart  If the video visit is dropped, the invitation should be resent by: Send to e-mail at: [email or web address]  Will anyone else be joining your video visit? No          Assessment & Plan     (N89.8) Vaginal discharge  (primary encounter diagnosis)  Comment: Differential diagnoses include BV, yeast infection, sexually transmitted infection, UTI.  Given patient's recent history of positive vaginal candidiasis with treatment, it is likely that this may be a reoccurrence of the same candidal infection.  Patient's description of thick white clumpy discharge accompanied by itching and burning sensation in the vagina is hallmark of a vaginal candidiasis infection.  However, vaginal candidiasis would not explain the foul order to the patient's vaginal discharge.  Ruling out BV or other infections will be helpful to guide the course of treatment.  Given patient's pain testing for STIs, and no new sexual partners, it is unlikely that this is a new sexually transmitted infection, so it is justifiable to forego STI testing at this time.  Patient denies possibility for pregnancy, so foregoing urine pregnancy test at this time per patient's privacy and request is justifiable.  Given recent treatment and recurrence of symptoms, more aggressive or extended treatment plan may be necessary.  If symptoms continue to bother patient I would not hesitate to refer the patient to urogynecology for closer diagnostic work-up and treatment plan.  Plan: Wet prep - Clinic Collect        -We will update treatment plan appropriately barring results of wet prep    (Z86.19) History of candidiasis of vagina  Comment: Recent candidal infection that was treated with 2 doses of 150 mg of Diflucan.  Patient's diagnosis of type 2 diabetes being treated with daily metformin are on noted risk factor for possible recurrent yeast  "infections.  Will need confirmative diagnosis to ensure appropriate treatment plan moving forward  Plan: Wet prep - Clinic Collect        If candidal infection becomes recurrent or persistent concern may consider initiating long-term prophylactic treatment via Diflucan 150 mg every 72 hours x 3 followed by Diflucan 150 mg once a week for 6 months.    I spent a total of 25 minutes on the day of the visit.   Time spent by me doing chart review, history and exam, documentation and further activities per the note       BMI:   Estimated body mass index is 34.2 kg/m  as calculated from the following:    Height as of 9/18/23: 1.602 m (5' 3.07\").    Weight as of 10/13/23: 87.8 kg (193 lb 8 oz).   Weight management plan: Patient followed by bariatric services    FURTHER TESTING:       -Schedule lab only appointment to obtain self collect wet prep  FUTURE APPOINTMENTS:       - Follow-up visit in 1 to 2 weeks with PCP if decided treatment plan is not effective       - Follow-up for annual visit or as needed    NATALYA Christianson CNP  [facility]    Nva Figueroa is a 28 year old, presenting for the following health issues:  Vaginitis (Personal concerns - will discuss with provider )      11/15/2023    12:34 PM   Additional Questions   Roomed by MARCELINO Dover   Sakshi is a 28-year-old female with a past medical history significant for migraine without aura, vitamin D deficiency, GERD, PCOS, irregular menses,, and major depression.  She presents today with concern for persistent abnormal vaginal discharge.  Patient was first seen for this concern on October 9, and prep confirmed vaginal yeast infection.  She notes at this time she took single dose of 150 mg Diflucan, and after symptoms were unresolved with a single dose, she repeated the dose a second time.  Patient notes that symptoms largely resolved after this treatment and she was feeling well until 1 week after treatment completion when her " "symptoms came back.  She notes th that symptoms resolved spontaneously after they returned.  She notes this fluctuation in symptoms with on and off burning sensation, abnormal vaginal discharge, and vaginal discomfort 3-4 times since she was first treated for yeast infection.  She notes that her symptoms are worst following intercourse.  Patient presents today after 3 weeks of another episode of vaginal discomfort.  Patient just abnormal vaginal discharge that is \"cottage cheese \"like in consistency and chunky.  She does attest to a foul odor to her discharge.  She says that the discharge is coming by itching, burning, and discomfort during intercourse in her vagina.  She did note that the discomfort had spread to her vulva as well.  Patient does not have any concern for STI and was pan tested at a previous visit when this concern began.  She also had a urine pregnancy test at that time and a UA UC that all came back negative.  Patient denies any open sores or lesions in her vaginal area.  She does have a history of irregular menses, and notes that she has not had a period since September.  She denies any urinary symptoms including urinary frequency, dysuria, cloudy urine, or blood in her urine.    History of Present Illness       Reason for visit:  Vaginal issues    She eats 0-1 servings of fruits and vegetables daily.She consumes 1 sweetened beverage(s) daily.She exercises with enough effort to increase her heart rate 9 or less minutes per day.  She exercises with enough effort to increase her heart rate 3 or less days per week.   She is taking medications regularly.      Review of Systems   Constitutional, HEENT, cardiovascular, pulmonary, gi and gu systems are negative, except as otherwise noted.      Objective           Vitals:  No vitals were obtained today due to virtual visit.    Physical Exam   GENERAL: Healthy, alert and no distress  EYES: Eyes grossly normal to inspection.  No discharge or erythema, or " obvious scleral/conjunctival abnormalities.  RESP: No audible wheeze, cough, or visible cyanosis.  No visible retractions or increased work of breathing.    SKIN: Visible skin clear. No significant rash, abnormal pigmentation or lesions.  NEURO: Cranial nerves grossly intact.  Mentation and speech appropriate for age.  PSYCH: Mentation appears normal, affect normal/bright, judgement and insight intact, normal speech and appearance well-groomed.      Karo Barahona DNP FNP-C  Family Nurse Practitioner - Same Day Provider  [facility] [place]          Video-Visit Details    Type of service:  Video Visit   Video Start Time: 1:30 PM  Video End Time:1:44 PM    Originating Location (pt. Location): Home    Distant Location (provider location):  On-site  Platform used for Video Visit: Mel

## 2023-11-16 ENCOUNTER — APPOINTMENT (OUTPATIENT)
Dept: LAB | Facility: CLINIC | Age: 28
End: 2023-11-16
Payer: COMMERCIAL

## 2023-11-16 LAB
CK SERPL-CCNC: 91 U/L (ref 26–192)
CLUE CELLS: NORMAL
TRICHOMONAS, WET PREP: NORMAL
TSH SERPL DL<=0.005 MIU/L-ACNC: 1.57 UIU/ML (ref 0.3–4.2)
WBC'S/HIGH POWER FIELD, WET PREP: NORMAL
YEAST, WET PREP: NORMAL

## 2023-11-16 PROCEDURE — 87210 SMEAR WET MOUNT SALINE/INK: CPT | Mod: VID

## 2023-11-17 DIAGNOSIS — N89.8 PRURITUS OF VAGINA: Primary | ICD-10-CM

## 2023-12-04 ENCOUNTER — OFFICE VISIT (OUTPATIENT)
Dept: OBGYN | Facility: CLINIC | Age: 28
End: 2023-12-04
Payer: COMMERCIAL

## 2023-12-04 VITALS
OXYGEN SATURATION: 99 % | SYSTOLIC BLOOD PRESSURE: 122 MMHG | HEIGHT: 63 IN | WEIGHT: 198.6 LBS | DIASTOLIC BLOOD PRESSURE: 84 MMHG | HEART RATE: 78 BPM | BODY MASS INDEX: 35.19 KG/M2

## 2023-12-04 DIAGNOSIS — N89.8 PRURITUS OF VAGINA: ICD-10-CM

## 2023-12-04 PROCEDURE — 99213 OFFICE O/P EST LOW 20 MIN: CPT | Performed by: OBSTETRICS & GYNECOLOGY

## 2023-12-04 NOTE — PATIENT INSTRUCTIONS
If you have any questions regarding your visit, Please contact your care team.     [facility] Response Services: [phone]    To Schedule an Appointment 24/7  Call: [phone]ONE [facility]     Boo Patiño MD  Medical Director        Mamie-ALFREDO Bangura-RN  Krys Craig-Surgery Scheduler  Yareli-Surgery Scheduler               Tuesday-[place]  7:30 a.m-4:30 p.m    Thursday-[place]  7:30 a.m-4:30 p.m    Typical Surgery Days: Tuesday or Friday [facility]  [address] [place] [address]  PH: [phone]    Imaging Scheduling all locations  PH: [phone]     [facility] Labor and Delivery  [address] [place] [address] [place] [address]  PH: [phone]    [facility]  [address] [place] [address]  PH: [phone] Fax      **Surgeries** Our Surgery Schedulers will contact you to schedule. If you do not receive a call within 3 business days, please call [phone].    Urgent Care locations:  [facility] Monday-Friday  10 am - 8 pm  Saturday and Sunday   9 am - 5 pm  Monday-Friday   10 am - 8 pm  Saturday and Sunday   9 am - 5 pm   [phone]   If you need a medication refill, please contact your pharmacy. Please allow 3 business days for your refill to be completed.  As always, Thank you for trusting us with your healthcare needs!    see additional instructions from your care team below

## 2023-12-04 NOTE — PROGRESS NOTES
"Sakshi is a 28 year old   is here today  in follow nup.  She has been having cycles of vaginal itching off and on for months.  The testing is inconsistent with times where there is no infection and others where there may be BV or yeast.  Her most recent bout has resolved after her cycle finished.      ROS: Pertinent ROS as above.    Gyn Hx:      Past Medical History:   Diagnosis Date    Depression     talk therapy    Depressive disorder     PCOS (polycystic ovarian syndrome)     Secondary amenorrhea     Vaginitis     Varicella     as child     Past Surgical History:   Procedure Laterality Date    PELVIC LAPAROSCOPY  2015    WRIST GANGLION EXCISION Right 2019         ALL/Meds: Her medication and allergy histories were reviewed and are documented in their appropriate chart areas.    SH: Reviewed and documented in the appropriate area of the chart.  FH:  Her family history is reviewed and updated in the chart, today.  PMH: Her past medical, surgical, and obstetric histories were reviewed and updated today in the appropriate chart areas.    PE: /84 (BP Location: Right arm, Patient Position: Sitting, Cuff Size: Adult Large)   Pulse 78   Ht 1.602 m (5' 3.07\")   Wt 90.1 kg (198 lb 9.6 oz)   LMP 2023 (Exact Date)   SpO2 99%   BMI 35.10 kg/m    Body mass index is 35.1 kg/m .    Pertinent Physical exam findings:    General Appearance:  healthy, alert, active, no distress  Discussed normal vaginal mayito and how it is protective from infections.  Discussed how it can be adversely affected and how probiotic use can help restore the balance.          A/P:(N89.8) Pruritus of vagina  Comment: 20 minutes spent on the date of the encounter doing chart review, patient visit, and documentation    Plan: Start an OTC vaginal probiotic       - No orders of the defined types were placed in this encounter.       "

## 2023-12-08 ENCOUNTER — OFFICE VISIT (OUTPATIENT)
Dept: ENDOCRINOLOGY | Facility: CLINIC | Age: 28
End: 2023-12-08
Payer: COMMERCIAL

## 2023-12-08 VITALS
HEART RATE: 88 BPM | BODY MASS INDEX: 34.82 KG/M2 | DIASTOLIC BLOOD PRESSURE: 78 MMHG | WEIGHT: 197 LBS | SYSTOLIC BLOOD PRESSURE: 114 MMHG

## 2023-12-08 DIAGNOSIS — E66.01 MORBID OBESITY (H): ICD-10-CM

## 2023-12-08 DIAGNOSIS — R06.83 SNORING: ICD-10-CM

## 2023-12-08 DIAGNOSIS — E28.2 POLYCYSTIC OVARY SYNDROME: Primary | ICD-10-CM

## 2023-12-08 PROCEDURE — 99213 OFFICE O/P EST LOW 20 MIN: CPT | Performed by: INTERNAL MEDICINE

## 2023-12-08 NOTE — PATIENT INSTRUCTIONS
-If back to eating in a predictable pattern, can try resuming metformin at 500 mg once daily with food and increase dose by 500 mg every week until you have reached goal of 2000 mg daily and then stay at that dose  -Evaluation in weight management clinic as planned  -Referral to sleep medicine  -Update me if pregnancy is suspected  -Return for a follow-up visit in March or April 2024, with labs before visit  -We will communicate results via MOOI, or if needed by phone

## 2023-12-08 NOTE — LETTER
12/8/2023         RE: Sakshi White  [address] [facility] [address]        Dear Colleague,    Thank you for referring your patient, Sakshi White, to the [facility]. Please see a copy of my visit note below.      ENDOCRINOLOGY FOLLOW-UP        HISTORY OF PRESENT ILLNESS    Sakshi White is seen in follow-up.  Patient is accompanied by her partner to this visit.    We had set goal of minimizing snacking after 8 PM and also intended to resume metformin extended release formulation at last visit in 10/2023.  However, Ms. White has had variable appetite and may only eat once a day on some days, multiple times a day on other days.  If she does not have metformin on hand when she eats, she is not able to take the medication since it causes significant nausea without food.  Therefore, she has not been able to take metformin as consistently.    Not having regular menstrual bleeding, unless she takes Provera (has predictable withdrawal bleeding after Provera).    Has not been following up in OB/GYN in the interim since last visit: Would like to pause on attempts at conceiving and fertility treatments since she wants to focus on weight loss first.    I had referred her to weight management clinic at our last appointment and she has a visit scheduled in 2/2024.    Has been feeling quite fatigued during the daytime.  Has had issues with initiating and maintaining sleep for the past 2 years: Benadryl was helpful but she was concerned about relying on this and requiring escalating dosages to achieve sleep.  She and her partner endorse nighttime snoring, no clear apneic spells.    Pertinent endocrine and related history:  1. PCOS. Diagnosis of PCOS made around age 18.  -Menarche was at age 11.  After onset of menarche, the patient recalls amenorrhea.  She was therefore prescribed OCP.  She had regular menstrual cycles while on OCP, although she took inconsistently.  -Hirsutism since  around age 16.  Hair growth is primarily on the face and abdomen.  She removes hair by waxing every week.  -.  Has been trying to conceive since at least  and has not been on OCP since then.  Despite this, she has had amenorrhea.  She has been prescribed Provera in gynecology and takes this periodically: Endorses withdrawal bleeding with Provera.  -Was prescribed metformin (along with spironolactone) in the past.  Was not able to tolerate metformin in the past since it caused worsening of reflux symptoms.  -She recalls laparoscopy in  for unknown condition related to fallopian tubes.  -She was treated with letrozole without successful conception.  2.  Elevated prolactin.  Testing in OB/GYN  (in 2022) revealed mildly elevated prolactin: This prompted referral to endocrinology. Recheck prolactin was normal.  3.  Prediabetes.  Family history is notable for father and multiple paternal relatives with diabetes.      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    PAST MEDICAL HISTORY  Past Medical History:   Diagnosis Date     Depression     talk therapy     Depressive disorder      PCOS (polycystic ovarian syndrome)      Secondary amenorrhea      Vaginitis      Varicella     as child       MEDICATIONS  Current Outpatient Medications   Medication Sig Dispense Refill     famotidine (PEPCID) 20 MG tablet TAKE 1 TABLET(20 MG) BY MOUTH DAILY AS NEEDED FOR HEARTBURN 90 tablet 0     ibuprofen (ADVIL/MOTRIN) 600 MG tablet Take 1 tablet (600 mg) by mouth every 6 hours as needed for moderate pain 60 tablet 0     medroxyPROGESTERone (PROVERA) 10 MG tablet Take 1 tablet (10 mg) by mouth daily PRN usually about 10 days per month 90 tablet 1     omeprazole (PRILOSEC) 20 MG DR capsule Take 1 capsule (20 mg) by mouth daily 90 capsule 3     metFORMIN (GLUCOPHAGE XR) 500 MG 24 hr tablet Start 500 mg (1 tab) p.o. daily with a meal for 1 week; if tolerating, increase  to 1000 mg (2 tabs) p.o. daily for 1 week; if tolerating, increase to 1500 mg (3 tabs) p.o. daily for 1 week; if tolerating, increase to 2000 mg (4 tablets) p.o. daily and continue at this dose for the long-term.  Take with food.  If side effect with a dose increase, reduce back to the last dose tolerated. (Patient not taking: Reported on 12/8/2023) 120 tablet 4       Allergies, family, and social history were reviewed and documented as needed in EHR.     REVIEW OF SYSTEMS  A focused ROS was performed, with pertinent positives and negatives as noted in the HPI.    PHYSICAL EXAM  /78 (BP Location: Right arm, Patient Position: Sitting, Cuff Size: Adult Regular)   Pulse 88   Wt 89.4 kg (197 lb)   LMP 11/16/2023 (Exact Date)   BMI 34.82 kg/m    Body mass index is 34.82 kg/m .  Constitutional: Vital signs reviewed, as recorded above. Patient is alert, oriented and appears in no acute distress.  Eyes: PER, EOMI, no stare, lid lag, or retraction; no conjunctival injection.  Respiratory: Normal chest wall motion and respiratory effort.  MSK: No clubbing or cyanosis; normal muscle bulk and tone.  Skin: No lesions on visible skin,  Neurological: Alert and oriented times 3. No tremor.        DATA REVIEW  Each of the following laboratory and/or imaging studies were reviewed.    Component      Latest Ref Rng 11/15/2023  11:22 AM   CK Total      26 - 192 U/L 91    TSH      0.30 - 4.20 uIU/mL 1.57          Component      Latest Ref Rng 10/6/2023  1:30 PM   Sodium      135 - 145 mmol/L 142    Potassium      3.4 - 5.3 mmol/L 4.0    Chloride      98 - 107 mmol/L 105    Carbon Dioxide (CO2)      22 - 29 mmol/L 26    Anion Gap      7 - 15 mmol/L 11    Urea Nitrogen      6.0 - 20.0 mg/dL 11.4    Creatinine      0.51 - 0.95 mg/dL 0.72    GFR Estimate      >60 mL/min/1.73m2 >90    Calcium      8.6 - 10.0 mg/dL 9.7    Glucose      70 - 99 mg/dL 88    Hemoglobin A1C      0.0 - 5.6 % 5.7 (H)       Legend:  (H)  High    ASSESSMENT  1.  PCOS.  There was a new finding of hyperprolactinemia in 8/2022, with normal prolactin on recheck.  Remainder of work-up for endocrinopathies which could have a similar presentation as PCOS has otherwise been unremarkable: No evidence of cortisol excess (although higher than expected dexamethasone levels on DST with normal cortisol response, 24-hour urine free cortisol levels were normal; thyroid function tests, testosterone, DHEA-sulfate, IGF-I and 17 hydroxyprogesterone has been normal).  Based on this, the patient's presentation is most consistent with PCOS.  Following in OB/GYN for assistance with reproductive therapies (most recently on Clomid), although she would like to put this on hold at least for the short-term.  Has not been able to take metformin as consistently due to variable food intake and mealtimes, can remain off it for now although I would suggest resuming when she has a more consistent diet plan.  Awaiting evaluation and weight management clinic: This will be helpful in managing weight and minimizing metabolic sequelae of PCOS.    2.  Hyperprolactinemia.  Normalized on subsequent checks.  No additional intervention.    3.  High risk for diabetes.  A1c checked in 10/2023 remained in prediabetes range, although slightly lower.  Long-term plan to resume metformin, as above.  If she achieves pregnancy, will need close follow-up of glycemia.    4.  Infertility.  See discussion above.    5.  Fatigue with nighttime snoring.  Along with difficulty initiating and maintaining sleep.  At risk for sleep apnea given BMI and diagnosis of PCOS.  Would recommend referral to sleep medicine, with which patient is in agreement.    PLAN  -If back to eating in a predictable pattern, can try resuming metformin at 500 mg once daily with food and increase dose by 500 mg every week until you have reached goal of 2000 mg daily and then stay at that dose  -Evaluation in weight management clinic as  planned  -Referral to sleep medicine  -Update me if pregnancy is suspected  -Return for a follow-up visit in March or April 2024, with labs before visit  -We will communicate results via MyChart, or if needed by phone      Orders Placed This Encounter   Procedures     Hemoglobin A1c     Basic metabolic panel     Adult Sleep Eval & Management Referral       I spent a total of 23 minutes on the date of encounter reviewing medical records, evaluating the patient, coordinating care and documenting in the EHR, as detailed above.      Maylin Sotelo MD   Division of Diabetes, Endocrinology and Metabolism  Department of Medicine      Again, thank you for allowing me to participate in the care of your patient.        Sincerely,        MAYLIN Sotelo MD

## 2023-12-08 NOTE — PROGRESS NOTES
ENDOCRINOLOGY FOLLOW-UP        HISTORY OF PRESENT ILLNESS    Sakshi White is seen in follow-up.  Patient is accompanied by her partner to this visit.    We had set goal of minimizing snacking after 8 PM and also intended to resume metformin extended release formulation at last visit in 10/2023.  However, Ms. White has had variable appetite and may only eat once a day on some days, multiple times a day on other days.  If she does not have metformin on hand when she eats, she is not able to take the medication since it causes significant nausea without food.  Therefore, she has not been able to take metformin as consistently.    Not having regular menstrual bleeding, unless she takes Provera (has predictable withdrawal bleeding after Provera).    Has not been following up in OB/GYN in the interim since last visit: Would like to pause on attempts at conceiving and fertility treatments since she wants to focus on weight loss first.    I had referred her to weight management clinic at our last appointment and she has a visit scheduled in 2024.    Has been feeling quite fatigued during the daytime.  Has had issues with initiating and maintaining sleep for the past 2 years: Benadryl was helpful but she was concerned about relying on this and requiring escalating dosages to achieve sleep.  She and her partner endorse nighttime snoring, no clear apneic spells.    Pertinent endocrine and related history:  1. PCOS. Diagnosis of PCOS made around age 18.  -Menarche was at age 11.  After onset of menarche, the patient recalls amenorrhea.  She was therefore prescribed OCP.  She had regular menstrual cycles while on OCP, although she took inconsistently.  -Hirsutism since around age 16.  Hair growth is primarily on the face and abdomen.  She removes hair by waxing every week.  -.  Has been trying to conceive since at least  and has not been on OCP since then.  Despite this, she has had amenorrhea.  She has been  prescribed Provera in gynecology and takes this periodically: Endorses withdrawal bleeding with Provera.  -Was prescribed metformin (along with spironolactone) in the past.  Was not able to tolerate metformin in the past since it caused worsening of reflux symptoms.  -She recalls laparoscopy in 2015 for unknown condition related to fallopian tubes.  -She was treated with letrozole without successful conception.  2.  Elevated prolactin.  Testing in OB/GYN  (in 8/2022) revealed mildly elevated prolactin: This prompted referral to endocrinology. Recheck prolactin was normal.  3.  Prediabetes.  Family history is notable for father and multiple paternal relatives with diabetes.      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    PAST MEDICAL HISTORY  Past Medical History:   Diagnosis Date    Depression     talk therapy    Depressive disorder     PCOS (polycystic ovarian syndrome)     Secondary amenorrhea     Vaginitis     Varicella     as child       MEDICATIONS  Current Outpatient Medications   Medication Sig Dispense Refill    famotidine (PEPCID) 20 MG tablet TAKE 1 TABLET(20 MG) BY MOUTH DAILY AS NEEDED FOR HEARTBURN 90 tablet 0    ibuprofen (ADVIL/MOTRIN) 600 MG tablet Take 1 tablet (600 mg) by mouth every 6 hours as needed for moderate pain 60 tablet 0    medroxyPROGESTERone (PROVERA) 10 MG tablet Take 1 tablet (10 mg) by mouth daily PRN usually about 10 days per month 90 tablet 1    omeprazole (PRILOSEC) 20 MG DR capsule Take 1 capsule (20 mg) by mouth daily 90 capsule 3    metFORMIN (GLUCOPHAGE XR) 500 MG 24 hr tablet Start 500 mg (1 tab) p.o. daily with a meal for 1 week; if tolerating, increase to 1000 mg (2 tabs) p.o. daily for 1 week; if tolerating, increase to 1500 mg (3 tabs) p.o. daily for 1 week; if tolerating, increase to 2000 mg (4 tablets) p.o. daily and continue at this dose for the long-term.  Take with food.  If side effect with a dose  increase, reduce back to the last dose tolerated. (Patient not taking: Reported on 12/8/2023) 120 tablet 4       Allergies, family, and social history were reviewed and documented as needed in EHR.     REVIEW OF SYSTEMS  A focused ROS was performed, with pertinent positives and negatives as noted in the HPI.    PHYSICAL EXAM  /78 (BP Location: Right arm, Patient Position: Sitting, Cuff Size: Adult Regular)   Pulse 88   Wt 89.4 kg (197 lb)   LMP 11/16/2023 (Exact Date)   BMI 34.82 kg/m    Body mass index is 34.82 kg/m .  Constitutional: Vital signs reviewed, as recorded above. Patient is alert, oriented and appears in no acute distress.  Eyes: PER, EOMI, no stare, lid lag, or retraction; no conjunctival injection.  Respiratory: Normal chest wall motion and respiratory effort.  MSK: No clubbing or cyanosis; normal muscle bulk and tone.  Skin: No lesions on visible skin,  Neurological: Alert and oriented times 3. No tremor.        DATA REVIEW  Each of the following laboratory and/or imaging studies were reviewed.    Component      Latest Ref Rng 11/15/2023  11:22 AM   CK Total      26 - 192 U/L 91    TSH      0.30 - 4.20 uIU/mL 1.57          Component      Latest Ref Rng 10/6/2023  1:30 PM   Sodium      135 - 145 mmol/L 142    Potassium      3.4 - 5.3 mmol/L 4.0    Chloride      98 - 107 mmol/L 105    Carbon Dioxide (CO2)      22 - 29 mmol/L 26    Anion Gap      7 - 15 mmol/L 11    Urea Nitrogen      6.0 - 20.0 mg/dL 11.4    Creatinine      0.51 - 0.95 mg/dL 0.72    GFR Estimate      >60 mL/min/1.73m2 >90    Calcium      8.6 - 10.0 mg/dL 9.7    Glucose      70 - 99 mg/dL 88    Hemoglobin A1C      0.0 - 5.6 % 5.7 (H)       Legend:  (H) High    ASSESSMENT  1.  PCOS.  There was a new finding of hyperprolactinemia in 8/2022, with normal prolactin on recheck.  Remainder of work-up for endocrinopathies which could have a similar presentation as PCOS has otherwise been unremarkable: No evidence of cortisol excess  (although higher than expected dexamethasone levels on DST with normal cortisol response, 24-hour urine free cortisol levels were normal; thyroid function tests, testosterone, DHEA-sulfate, IGF-I and 17 hydroxyprogesterone has been normal).  Based on this, the patient's presentation is most consistent with PCOS.  Following in OB/GYN for assistance with reproductive therapies (most recently on Clomid), although she would like to put this on hold at least for the short-term.  Has not been able to take metformin as consistently due to variable food intake and mealtimes, can remain off it for now although I would suggest resuming when she has a more consistent diet plan.  Awaiting evaluation and weight management clinic: This will be helpful in managing weight and minimizing metabolic sequelae of PCOS.    2.  Hyperprolactinemia.  Normalized on subsequent checks.  No additional intervention.    3.  High risk for diabetes.  A1c checked in 10/2023 remained in prediabetes range, although slightly lower.  Long-term plan to resume metformin, as above.  If she achieves pregnancy, will need close follow-up of glycemia.    4.  Infertility.  See discussion above.    5.  Fatigue with nighttime snoring.  Along with difficulty initiating and maintaining sleep.  At risk for sleep apnea given BMI and diagnosis of PCOS.  Would recommend referral to sleep medicine, with which patient is in agreement.    PLAN  -If back to eating in a predictable pattern, can try resuming metformin at 500 mg once daily with food and increase dose by 500 mg every week until you have reached goal of 2000 mg daily and then stay at that dose  -Evaluation in weight management clinic as planned  -Referral to sleep medicine  -Update me if pregnancy is suspected  -Return for a follow-up visit in March or April 2024, with labs before visit  -We will communicate results via [facility], or if needed by phone      Orders Placed This Encounter   Procedures    Hemoglobin  A1c    Basic metabolic panel    Adult Sleep Eval & Management Referral       I spent a total of 23 minutes on the date of encounter reviewing medical records, evaluating the patient, coordinating care and documenting in the EHR, as detailed above.      Maura Sotelo MD   Division of Diabetes, Endocrinology and Metabolism  Department of Medicine

## 2023-12-14 DIAGNOSIS — K21.9 GASTROESOPHAGEAL REFLUX DISEASE WITHOUT ESOPHAGITIS: ICD-10-CM

## 2023-12-14 RX ORDER — FAMOTIDINE 20 MG/1
TABLET, FILM COATED ORAL
Qty: 90 TABLET | Refills: 2 | Status: SHIPPED | OUTPATIENT
Start: 2023-12-14

## 2024-02-09 PROBLEM — N97.1 OBSTRUCTION OF FALLOPIAN TUBE: Status: ACTIVE | Noted: 2023-10-06

## 2024-02-09 PROBLEM — N97.9 FEMALE INFERTILITY: Status: ACTIVE | Noted: 2022-08-15

## 2024-02-09 ASSESSMENT — SLEEP AND FATIGUE QUESTIONNAIRES
HOW LIKELY ARE YOU TO NOD OFF OR FALL ASLEEP WHILE WATCHING TV: WOULD NEVER DOZE
HOW LIKELY ARE YOU TO NOD OFF OR FALL ASLEEP IN A CAR, WHILE STOPPED FOR A FEW MINUTES IN TRAFFIC: WOULD NEVER DOZE
HOW LIKELY ARE YOU TO NOD OFF OR FALL ASLEEP WHILE SITTING QUIETLY AFTER LUNCH WITHOUT ALCOHOL: WOULD NEVER DOZE
HOW LIKELY ARE YOU TO NOD OFF OR FALL ASLEEP WHILE LYING DOWN TO REST IN THE AFTERNOON WHEN CIRCUMSTANCES PERMIT: WOULD NEVER DOZE
HOW LIKELY ARE YOU TO NOD OFF OR FALL ASLEEP WHEN YOU ARE A PASSENGER IN A CAR FOR AN HOUR WITHOUT A BREAK: WOULD NEVER DOZE
HOW LIKELY ARE YOU TO NOD OFF OR FALL ASLEEP WHILE SITTING AND TALKING TO SOMEONE: WOULD NEVER DOZE
HOW LIKELY ARE YOU TO NOD OFF OR FALL ASLEEP WHILE SITTING AND READING: WOULD NEVER DOZE
HOW LIKELY ARE YOU TO NOD OFF OR FALL ASLEEP WHILE SITTING INACTIVE IN A PUBLIC PLACE: WOULD NEVER DOZE

## 2024-02-12 ENCOUNTER — LAB (OUTPATIENT)
Dept: LAB | Facility: CLINIC | Age: 29
End: 2024-02-12
Payer: COMMERCIAL

## 2024-02-12 ENCOUNTER — OFFICE VISIT (OUTPATIENT)
Dept: SURGERY | Facility: CLINIC | Age: 29
End: 2024-02-12
Payer: COMMERCIAL

## 2024-02-12 VITALS
WEIGHT: 197.5 LBS | DIASTOLIC BLOOD PRESSURE: 68 MMHG | BODY MASS INDEX: 36.34 KG/M2 | SYSTOLIC BLOOD PRESSURE: 110 MMHG | HEIGHT: 62 IN

## 2024-02-12 DIAGNOSIS — E66.812 CLASS 2 SEVERE OBESITY DUE TO EXCESS CALORIES WITH SERIOUS COMORBIDITY AND BODY MASS INDEX (BMI) OF 36.0 TO 36.9 IN ADULT (H): ICD-10-CM

## 2024-02-12 DIAGNOSIS — R79.89 LOW VITAMIN D LEVEL: ICD-10-CM

## 2024-02-12 DIAGNOSIS — R73.03 PREDIABETES: ICD-10-CM

## 2024-02-12 DIAGNOSIS — E66.812 CLASS 2 SEVERE OBESITY DUE TO EXCESS CALORIES WITH SERIOUS COMORBIDITY AND BODY MASS INDEX (BMI) OF 36.0 TO 36.9 IN ADULT (H): Primary | ICD-10-CM

## 2024-02-12 DIAGNOSIS — E66.01 CLASS 2 SEVERE OBESITY DUE TO EXCESS CALORIES WITH SERIOUS COMORBIDITY AND BODY MASS INDEX (BMI) OF 36.0 TO 36.9 IN ADULT (H): ICD-10-CM

## 2024-02-12 DIAGNOSIS — E66.01 CLASS 2 SEVERE OBESITY DUE TO EXCESS CALORIES WITH SERIOUS COMORBIDITY AND BODY MASS INDEX (BMI) OF 36.0 TO 36.9 IN ADULT (H): Primary | ICD-10-CM

## 2024-02-12 LAB
ALBUMIN SERPL BCG-MCNC: 4 G/DL (ref 3.5–5.2)
ALP SERPL-CCNC: 43 U/L (ref 40–150)
ALT SERPL W P-5'-P-CCNC: 9 U/L (ref 0–50)
ANION GAP SERPL CALCULATED.3IONS-SCNC: 11 MMOL/L (ref 7–15)
AST SERPL W P-5'-P-CCNC: 25 U/L (ref 0–45)
BILIRUB SERPL-MCNC: 0.2 MG/DL
BUN SERPL-MCNC: 8.7 MG/DL (ref 6–20)
CALCIUM SERPL-MCNC: 9.6 MG/DL (ref 8.6–10)
CHLORIDE SERPL-SCNC: 102 MMOL/L (ref 98–107)
CREAT SERPL-MCNC: 0.74 MG/DL (ref 0.51–0.95)
DEPRECATED HCO3 PLAS-SCNC: 24 MMOL/L (ref 22–29)
EGFRCR SERPLBLD CKD-EPI 2021: >90 ML/MIN/1.73M2
GLUCOSE SERPL-MCNC: 106 MG/DL (ref 70–99)
HBA1C MFR BLD: 5.9 % (ref 0–5.6)
HCG UR QL: NEGATIVE
POTASSIUM SERPL-SCNC: 4.1 MMOL/L (ref 3.4–5.3)
PROT SERPL-MCNC: 7 G/DL (ref 6.4–8.3)
PTH-INTACT SERPL-MCNC: 31 PG/ML (ref 15–65)
SODIUM SERPL-SCNC: 137 MMOL/L (ref 135–145)
TSH SERPL DL<=0.005 MIU/L-ACNC: 1.26 UIU/ML (ref 0.3–4.2)
VIT B12 SERPL-MCNC: 395 PG/ML (ref 232–1245)
VIT D+METAB SERPL-MCNC: 17 NG/ML (ref 20–50)

## 2024-02-12 PROCEDURE — 80053 COMPREHEN METABOLIC PANEL: CPT

## 2024-02-12 PROCEDURE — 82306 VITAMIN D 25 HYDROXY: CPT

## 2024-02-12 PROCEDURE — 99205 OFFICE O/P NEW HI 60 MIN: CPT | Performed by: EMERGENCY MEDICINE

## 2024-02-12 PROCEDURE — 81025 URINE PREGNANCY TEST: CPT

## 2024-02-12 PROCEDURE — 83970 ASSAY OF PARATHORMONE: CPT

## 2024-02-12 PROCEDURE — 36415 COLL VENOUS BLD VENIPUNCTURE: CPT

## 2024-02-12 PROCEDURE — 83036 HEMOGLOBIN GLYCOSYLATED A1C: CPT

## 2024-02-12 PROCEDURE — 84443 ASSAY THYROID STIM HORMONE: CPT

## 2024-02-12 PROCEDURE — 82607 VITAMIN B-12: CPT

## 2024-02-12 RX ORDER — SEMAGLUTIDE 0.5 MG/.5ML
0.5 INJECTION, SOLUTION SUBCUTANEOUS
Qty: 2 ML | Refills: 0 | Status: SHIPPED | OUTPATIENT
Start: 2024-02-12 | End: 2024-02-13

## 2024-02-12 RX ORDER — SEMAGLUTIDE 0.25 MG/.5ML
0.25 INJECTION, SOLUTION SUBCUTANEOUS WEEKLY
Qty: 2 ML | Refills: 0 | Status: SHIPPED | OUTPATIENT
Start: 2024-02-12 | End: 2024-03-11

## 2024-02-12 RX ORDER — SEMAGLUTIDE 1 MG/.5ML
1 INJECTION, SOLUTION SUBCUTANEOUS
Qty: 2 ML | Refills: 0 | Status: SHIPPED | OUTPATIENT
Start: 2024-02-12 | End: 2024-02-13

## 2024-02-12 NOTE — PATIENT INSTRUCTIONS
"Plan:  Welcome to your weight loss season. To start we'll aim for protein and vegetable rich meals every 4.5-6 hours to stay just ahead of your hunger. Meals of 400-450kcal/meal and 20-24 grams of lean protein per meal should be very satisfying and help hit that 1450-1550kcal/day target right now while you're lightly active.       2. Ramp up Wegovy if tolerated well:  0.25mg/week for 4 weeks then 0.5mg/week for 4 weeks then 1mg/week for 4 weeks then 1.7mg/week for 4 weeks then 2.4mg/week for 9 months.   Call us when you  your Wegovy so the 1.7mg and 2.4mg/week doses can be prescribed and the 1.7mg/week dose can ramp up about 4 months after the start of your first dose of Wegovy.     Stop Wegovy if rash/throat swelling/severe abdominal pain or vomiting/intractable constipation or need for surgery. Don't use if trying to get pregnant and stop immediately if pregnancy occurs accidentally.     3. Follow up with dietician.    4. Check labs today.     5. Use food tracker to stay on point with your food/protein intake.     6. 10 minutes or more each day of walking would be great.  Aiming to build to 150minutes/week by mid summer.       What makes a person succeed with dramatic and sustained weight loss?    It's being at the right point in your life where you feel the need to lose the weight, not because anyone told you so but because of a voice inside of you that says, \"I am ready for this\".  You're now at a point where you may be feeling anxious, irritable and when you look in the mirror you do not recognize the person looking back.  Your healthy self is buried somewhere in that reflexion and you want to free it again.  This is the sort of motivation that leads to success, and it comes from you.    Because the only person that can lose that extra weight is you, I like my patients to focus on the mindset of being in Weight Loss Season.  This gives you permission to make the changes necessary to be consistent with " "the diet/activity and behavior changes that lead to successful, healthy weight loss.  Nearly any diet plan can work for weight loss, but keeping it healthy and nutrient based to prevent deficiencies/hair loss/fatigue or irritability is vital.  If you have a plan you want to try, we'll work with you to make sure no adjustments are needed to keep you healthy through your weight loss season and working with our Bariatric Dieticians you'll be given expert guidance to customize your diet plan to suit your particular needs. If you don't have your own ideas in mind, we are always happy to suggest well researched and validated plans that provide enough food to prevent hunger but still tap into your excess fat reserves and lose weight in a sustainable fashion.  There is great evidence that lean protein/healthy fat intake with good fiber intake while minimizing simple starches/carbs produces reliable/sustainable weight loss in most people. But some feel more connected to an intermittent fasting/fast mimicking or ketogenic diet.  These protocols can be hard for many to stick with and that's why we prefer the protein/healthy fat focused diets but if these alternative strategies appeal to you, we can work with you to optimize your knowledge and results with these tools.    Losing weight is a temporary commitment, but you need to be \"All In\" to have a good weight loss season.  To avoid frustration, you have to be willing to be on track 19 out of 20 days or even better than that. But, weight loss season is generally only 4-8 months in length. After that length of time, it can be hard to maintain a negative calorie balance and our brain, motivations and metabolism will usually bring you to a plateau that cannot be broken in this modern world where other commitments start to take priority. That's when we look to stabilize the weight loss you've achieved.  If you've reached your goal by that time, fantastic, and job well done.  If " "there is more to go, then after a few months of stabilization, we can usually attack that previous plateau and break into new territory.    Because of this time limitation, we want to really get to work right away and get into a sustainable routine ASAP.  One of the best predictors of how much weight you're going to lose throughout the season is how much you lose in the first 6 weeks, so prepare well and jump in with both feet.      Occasionally, people may feel like they cannot commit fully to the changes necessary and may want to change one thing at a time and \"get used to\" the idea of losing weight.  That is OK because that is where they are in their life, and they cannot fully commit for any number of reasons.  It's part of that internal motivation and they just haven't reached PRIORTY NUMBER ONE status yet. It's possible that what they need is more time to reach that point and I am always willing to work with people that want to \"dabble\", but understand, the amount of success obtained with half measures, is much less than half results. Behavior Change cannot occur until we prepare our minds, bodies and environment for what is to come, action!    As you go through your plan, look for things to keep your motivation rolling.  The most successful people have a goal or target/reward that they are working towards.  Having a reward that celebrates your new fitness, mobility and energy is the best sort because it will encourage you to do well with the weight maintenance phase and long term lifestyle changes that promotes keeping the weight off for the long term.  Usually, \"getting healthier\" or improving blood tests or losing weight so your clothes fit better is not as internally motivating as having a tangible reward.  A good weight loss season reward is one that isn't food based, is affordable, but something special:  Something you won't be getting/doing unless you achieve your goal.   It s important to keep to the " rule of success:  in order to get the reward, your goal MUST be achieved. Write this reward down, where you can look at it daily and keep it in the front of your mind as you go through your weight loss season and it will help keep you on track.    Tools that help change behavior are vital for success. The most studied and most supported tool for weight loss is nothing more than writing down your food and weight every day.  Every Day.  Accurately and completely.  When you commit to weight loss season, this information tells you whether you're getting ENOUGH food to fuel your weight loss properly as well as teaches you the interaction between different foods you eat and how your body responds with weight loss.  You'll see that sometimes after a heavy workout you don't see the scale move until 2-3 days later.  How saltier meals (chili for example) may make you retain water for 4-5 days before you see the weight come off, you'll get used to the mini-plateaus that develop after a good 3-8 lb drop in weight as well as how you break through if you keep working the diet as you should.    Weight loss is not a linear process, there are mini ups and downs.  Learning how your body loses weight and getting comfortable with that is very rewarding. The act of writing words on paper also solidifies your will power and commitment to the season of weight loss and that by itself changes your brain chemistry/appetite, motivation and prepares you for maximal success.     Behavior change is all about getting into a new routine.  The old habits and routine have to change because without changing the circumstances of how you gained your weight, it's unlikely you'll enjoy satisfying results. If you have snacking habits, like every time you walk through the kitchen you grab a little something, well, that habit has to change and be replaced by a new habit.  It can be something as simple as keeping a doodle pad on the counter that you make a  "few scribbles and then walk through the kitchen having not opened the cupboard, or starting with a glass of water and leaving the kitchen without anything else, or checking your food journal to see how many calories you have left for the day.  Boredom is the enemy as are the old habits. Break new ground and try to push those old habits into a deep hole.  There are apps/counseling options available that can help with some of the day to day urges/behaviors if you're struggling. One commercial product that does a good job is Noom.  Unfortunately, there is a subscription fee.    Finally, exercise always helps.  While not mandatory to lose weight, every little bit helps and exercise has so many other benefits that to not work it into your plan is to miss out on all the mood, sleep, stress and general health benefits that come from making yourself a little short of breath and sweaty at least 3-4 days out of the week.  The metabolism and calorie burn benefits aside, almost every chronic ailment in medicine gets better with proper, aerobic exercise.  Allow yourself to start slow and let your body prepare itself to accept harder training 4-6 weeks down the road, but start now and commit to a plan.  Whether you have the means to hire a , join a gym or just walk out your front door or go down to your basement for a video workout, get into a exercise routine and  after 3 weeks of at least 3 times a week exercise you should be at a point where you can slowly start ramping up 10% each week to our goal of at least 150-300minutes weekly of aerobic exercise and at least twice weekly resistance training/strenghtening with weights/bands or body weight exercises.     I am a big fan of modifying the free training plan, \"Couch to 5k\", for almost all of my patients. Just type it into anfix or look it up on your smart phone refugio store.  To modify the plan,  you can use the training plan for whatever aerobic activity you do " "(bike/treadmill/elliptical/rower/pool/etc). During the \"jog\" intervals, you just move a little faster or harder or increase the tension or incline.  You use those little intervals to switch up the workout and recruit more muscles and pump the blood a little more and then recover again in the \"walk\" intervals by slowing down, decreasing the incline or turning down the tension.  3-4 days a week is not that much to ask and the benefits are enormous.  Start slow and develop the base from which you can then build on and reduce the risk of injury.  It's much more important 2-4 months from now to be enjoying your exercise then it is to over exert yourself at the start and hurt yourself.  Starting slowly allows your body to accept the training better down the road when the exercise becomes crucial for weight maintenance.  Without exercise down the road during your maintenance phase, all this hard work you are about to put in can be undone. It usually takes about 100-300 calories a day of exercise to maintain a weight loss and our focus during weight loss season is to generate the routine/activities and hobbies that make that enjoyable/sustainable.    Thanks for taking this first and most important step in your weight loss season.  Commit to it and we will cheerlead you all the way to success.  When things get tough or off track we'll offer guidance and analysis and when you reach your goal we'll celebrate your success.  In the end, it is all about your success, your health and what you do with it.      Guanaco Alvarez MD  [facility]  [phone]          LEAN PROTEIN SOURCES  Getting 20-30 grams of protein, 3 meals daily, is appropriate for most people, some need more but more than about 40 grams per meal is not useful.  General rule is drinking one ounce of water per gram of protein eaten over the course of the day:  70 grams of protein each day, drink 70 oz of water.  Protein Source " Portion Calories Grams of Protein                           Nonfat, plain Greek yogurt    (10 grams sugar or less) 3/4 cup (6 oz)  12-17   Light Yogurt (10 grams sugar or less) 3/4 cup (6 oz)  6-8   Protein Shake 1 shake 110-180 15-30   Skim/1% Milk or lactose-free milk 1 cup ( 8 oz)  8   Plain or light, flavored soymilk 1 cup  7-8   Plain or light, hemp milk 1 cup 110 6   Fat Free or 1% Cottage Cheese 1/2 cup 90 15   Part skim ricotta cheese 1/2 cup 100 14   Part skim or reduced fat cheese slices 1 ounce 65-80 8     Mozzarella String Cheese 1 80 8   Canned tuna, chicken, crab or salmon  (canned in water)  1/2 cup 100 15-20   White fish (broiled, grilled, baked) 3 ounces 100 21   [place]/Tuna (broiled, grilled, baked) 3 ounces 150-180 21   Shrimp, Scallops, Lobster, Crab 3 ounces 100 21   Pork loin, Pork Tenderloin 3 ounces 150 21   Boneless, skinless chicken /turkey breast                          (broiled, grilled, baked) 3 ounces 120 21   Marty, [place], Grimes, and Venison 3 ounces 120 21   Lean cuts of red meat and pork (sirloin,   round, tenderloin, flank, ground 93%-96%) 3 ounces 170 21   Lean or Extra Lean Ground Turkey 1/2 cup 150 20   90-95% Lean [place] Burger 1 natalya 140-180 21   Low-fat casserole with lean meat 3/4 cup 200 17   Luncheon Meats                                                        (turkey, lean ham, roast beef, chicken) 3 ounces 100 21   Egg (boiled, poached, scrambled) 1 Egg 60 7   Egg Substitute 1/2 cup 70 10   Nuts (limit to 1 serving per day)  3 Tbsp. 150 7   Nut Monarch (peanut, almond)  Limit to 1 serving or less daily 1 Tbsp. 90 4   Soy Burger (varies) 1  15   Garbanzo, Black, [place] Beans 1/2 cup 110 7   Refried Beans 1/2 cup 100 7   Kidney and Lima beans 1/2 cup 110 7   Tempeh 3 oz 175 18   Vegan crumbles 1/2 cup 100 14   Tofu 1/2 cup 110 14   Chili (beans and extra lean beef or turkey) 1 cup 200 23   Lentil Stew/Soup 1 cup 150 12   Black Bean Soup 1 cup  175 12           Example Meal Plan for a 0338-9087 Calorie Diet:    In order to fuel your weight loss properly and avoid hunger-induced overeating later in the day, for your height and weight, you will enjoy the most success by following the diet below or similar with adjustments based on your particular tastes and preferences.  Exercise may influence speed, amount of weight loss further.     I recommend getting into a meal routine and keeping it similar day to day in the beginning so you don t have to think too hard about what you re going to make/eat.  Keep snacks healthy, ideally containing protein and some vegetables.  Non-processed food is preferable to packaged items.  Eat at least a few crunchy green vegetables if having a snack, which should be 2-3 hours after your mealtimes(prepare these ahead of time for ease of use).  Drink 64 oz -80 oz of water daily for most, some of you will need more and we'll discuss it at your visit if that is the case.      When changing our diet,  we can often mistake thirst for hunger or just have some distracted eating habits that we need to break free from ('bored/mindless eating', screen time,work, driving,etc).  A glass of water and reconsideration of our hunger is often all that is needed.  Having the urge is not the problem, but watching it pass by without acting on it is the goal.    If you re having hunger problems, add a protein drink/snack to your morning hours or afternoon snack with at least 20grams of protein and not too much sugar (under 10g).  A carton of higher protein/low sugar yogurt can work as well.  If the urge to snack is overwhelming and not satiated, try going for a 10 minute walk/exercise, come home and drink a glass of water and if still hungry, have a  calorie snack (handful of raw/sprouted nuts, veggies and string cheese, protein bar, etc).  Savor it.    It is better to have a large breakfast, a moderate lunch and a smaller dinner to fuel your  day.  People lose 10-15% more weight during their weight loss season with this strategy. Optimizing your protein intake at each meal will further keep you more satisfied while eating less food overall.  Getting exercise in early has also been shown to offer the best results (before breakfast ideally but anytime is the right time to exercise if that is not an option for you).    To make sure you re getting adequate vitamins and minerals during weight loss, I recommend one complete multivitamin a day of your choice.  Consider a probiotic and taking some vitamin D 2000 IU daily.    Let supper be your last meal of the day and ideally try to have at least 12 hours between supper and breakfast the next day to tap into some beneficial overnight fasting dynamics.  Midnight snacks need to go away. Water in the evening is fine, unsweetened, non caffeinated herbal tea is helpful as well.  Consolidating your meals within a 8-12 hour period of your day will help tap into these additional metabolic benefits and tends to keep your appetite up for breakfast, further helping to stay on track.  For most of my patients, I don't recommend an intermittent fasting style diet (many find it hard to fit in their lifestyle) but an overnight fast is very doable for most patients and helps regulate our hunger drives a little better.  This makes it very important to nail good intake at all three meals to feel satisfied/energized and still lose weight.      If evening snacking desires are high, consider a glass of fiber supplement for some additional fullness (metamucil or similar). Most of us don't get the 25-30 grams per day of fiber that promotes good gut health/satiety.  Benefiber, metamucil, citrucel are reasonable/affordable options for most people.  Inulin, chicory, psyllium husk are reasonable options but start slow and low in the dose to avoid gas/bloating until your gut gets acclimated (ramping up to 5-10 grams per day of supplemental  fiber after 3-4 weeks if needed).      Example Meal Plan:  Breakfast: 450-475 Calories  1 egg cooked on low in olive oil:   calories.  5oz Greek Yogurt (Fage plain classic: ~150 shirley)  Handful of Berries of your choice (about a calorie per berry or 20-40cal per handful)    cup(cooked) of  old fashioned oatmeal or 1/2 cup(cooked) steel cut oats. (150 shirley)  Sprinkle amount of brown sugar and a pat of butter. (40 shirley)  Glass of  Water  Black coffee or unsweetened Tea (0calories).      2-3 hours Later Snack: (195 calories).  Glass of water  One string Cheese (80 calories) or 4 oz creamed cottage cheese (115 calories) with  Crunchy Celery sticks (less than 10 calories per large stalk) 2 stalks. (20 calories)    of a  Large Banana or   of a Large Apple (60 calories):  eat second half at lunch or afternoon snack.     Lunch:300 -350 calories   Chicken Breast  (baked/broiled/roasted/grilled)  4-6 oz.  (125-180 shirley), BBQ sauce/hot sauce/mustard/seasoning is free. Just use a reasonable amount. Or a can of tuna with 1 tablespoon mayonnaise.  Salad: lettuce, any other veggies (cucumbers, green peppers/celery you like and a small drizzle of dressing to just flavor.  Go as big on the veggies as you like,  as they are practically calorie free.   A whole, 8 inch cucumber is 45 calories, a whole green pepper is 23 calories, a stalk of celery is 9 calories.  [place] Dressing is 60 calories per tablespoon..so moderate your desired dressing or do a drizzle of olive oil and splash of balsamic vinegar on top,  Total calories unlikely to be over 150 even with dressing.  Glass of Water.    Option for lunch is meal replacement protein drink/smoothie.  Need at least 20 grams of protein and eat the rest of your apple/banana from the morning snack.      Afternoon Snack: 150-200 calories   Cheese Stick or cottage cheese again  and a fresh fruit OR  Granola Bar (protein Bar acceptable if under 200 calories OR  Homemade smoothies:   8oz skim milk,  a handful of berries (fresh or frozen and a serving of protein powder such as BiPro or Shanel sWhey for example.  If you don't like dairy, make with 8oz water, one small banana, handful of berries and the protein powder, add any veggies you want as well:  roughly 200 calories.   Glass of Water    Dinner: 325 calories  4oz of fresh, [place] salmon.  Broiled (salt/pepper/dill) for about 8-8.5 minutes (200calories) or  4oz filet mignon steak or sirloin steak  Salad or vegetable sautéed lightly in olive oil or   Broccoli 1.5  cups chopped and steamed  or micro-waved in a little water (75 calories)  Glass of Water,    Cup of herbal tea (unsweetened, caffeine free)      Herbs and seasonings are encouraged to flavor your foods/vegetables.  Make your food delicious.      Tips for Success:  1.  Prepare proteins ahead of time (broil chicken breasts in bulk so you can grab and go), steel cut oats/lentils can be stored in casserole dish/bowl in the fridge for quick scoop in the morning and rewarm in microwave, make use of crock pot recipes (watch salt content).  Making meals that cover 3-4 future meals is an easy way to stay on track.  2.  Drink a 8-12 oz glass of water every 2-3 hours when awake.  We often mistake hunger for thirst, especially when losing weight.  3. Remember your Reward and Motivation when things get hard.  4.  Weigh yourself every morning and record, you'll stay on track better and learn how our biorhythms, diet and elimination patterns show up on the scale. Don't worry about 1 or 2 day patterns, but when on track you'll notice good trend downward of weight over 3-4 day segments.  Plateaus tend to resolve after 4-8 days in most cases if you stay consistent with your plan.  These are natural and part of weight loss, even if you're perfect with your plan execution.  5. Call if problems/concerns.  [employer] is a great tool to stay in touch and provide weekly outside accountability. Check in with  "questions or if you want to brag.  6.  Find a handful of meals/foods that keep you on track and feeling good and get into a routine that is sustainable for you.  It's OK to have a routine that works for you.  7.  Consider taking a complete multivitamin just to make sure all micronutrients are adequate during weight loss.  8. If losing hair/brittle nails it usually means you are not taking enough protein.  Minimum goal is 60 grams daily of protein for smaller women, 80 grams a day for men. Consider taking Biotin as supplement or a \"Hair and Nail\" multivitamin.      On-the-Go Breakfast Ideas  As of 2015, the latest research shows what a huge impact eating breakfast has on losing weight and feeling your best. People lose more weight when they make breakfast their biggest meal of the day compared to Dinner, but even if you cannot go to that degree, getting a breakfast that has at least 20 grams of protein and even a moderate amount of fat is ideal for maintaining good energy through the day and limits overeating in the evening hours.  The following are some quick and easy suggestions for at least getting something of substance into your body in the morning.  Enjoy!    Eating breakfast within 90 minutes of waking up is an important part of taking care of your body on a restricted calorie diet plan.  After sleeping for hours, your body is in need of fuel.  An ideal breakfast is a combination of protein, whole-grain carbohydrates, or fruit.  Here s why:    -Protein digests very slowly in the body, helping you feel more satisfied.  -Whole grains provide dietary fiber, which also digests slowly and helps keep your gut clean.  -Fruit is a great source of vitamins, minerals, and fiber.     Each one of these breakfast combinations has between 200-300 calories and 15-20 grams of protein.  Feel free to mix and match!    Bone Broth (chicken bone broth or beef bone broth) is a great way to boost protein content. 8oz of bone broth " will typically have 9-12grams of protein for 40kcal of energy.    Protein: Choose  -1/2 cup low-fat cottage cheese  -2 hard boiled eggs , or one cooked in olive oil (low/slow heat).  -1 low fat string cheese stick  -1 Tablespoon natural peanut butter  -[employer] vegetarian sausage natalya (found in freezer section)  -1 slice lowfat cheese  -6 oz 2% or lowfat Greek yogurt, such as Fage or Oikos.    PLUS    Whole Grains:  Choose   -1 whole wheat English muffin  -1 whole wheat ujan, half  -1/2 Fiber One frozen muffin, thawed  -1/2 Fiber One toaster pastry  -1 whole wheat bagel thin  -1/2 cup Kashi cereal  -1 Kashi waffle (or other whole grain high-fiber waffle)  Aim for whole grain/sprouted breads with at least 3g of fiber/slice if having bread. [place] is one such brand.    OR    Fruit: Choose  -1/3 cup blueberries  -1/2 banana (or a plantain- similar to a banana, yet smaller)  -1/2 cup cantaloupe cubes  -1 small apple  -1 small orange  -1/2 cup strawberries  -handful raspberries/blackberries (each berry is about 1 calorie).    *Adapted from [employer], Fall 20    Ten Breakfasts Under 250 calories    Ideally, getting between 350-600 calories  (depending on starting height and weight)for breakfast is ideal for avoiding hunger later in the day, adjust/add to the following accordingly:    One- 250 calories, 8.5 g protein  1 slice whole-grain toast   1 Tbsp peanut butter    banana    Two- 250 calories, 8 g protein    cup nonfat/lowfat yogurt  1/3rd cup diced no-sugar peaches  1/3rd cup cereal (like Special K, Cheerios, or bran flakes)    Three- 250 calories, 25 g protein  1 egg scrambled with 1 oz skim milk    cup shredded cheddar    whole grain English muffin  1 oz [place] arguello  1 tsp margarine spread    Four- 225 calories, 25 g protein  1/2 cup Kashi Go-Lean cereal    cup skim milk mixed with 1 scoop Bariatric Advantage protein powder    cup no-sugar diced pears    Five- 250 calories, 20 g protein     cup oatmeal prepared with skim milk, 1 scoop protein powder, and sugar-free maple syrup    Six- 200 calories, 5 g protein  1 whole grain waffle, toasted  1 tablespoon creamy peanut or almond butter    Seven-  250 calories, 19 g protein  Breakfast sandwich: 1 slice whole grain toast, cut in half.  Add 1 scrambled egg and one slice cheddar  cheese.    Eight-  250 calories, 15 g protein  2 eggs scrambled with 1/3 cup frozen spinach (heat before adding to eggs) and 2 tablespoons low fat cream cheese.    Nine-  150 calories, 15 g protein  2/3rd cup cottage cheese    cup cantaloupe    Ten- 200 calories, 20 g protein  Fruit smoothie made with 4 oz. nonfat Greek yogurt,   cup berries, 1 scoop protein powder, and 4 oz skim milk.    Ten Lunches Under 250 Calories    Aim for lunch to be around 300-400 calories a day when trying to lose weight and get that protein in!    One- 200 calories, 11 g protein  1/3 cup tuna salad made with light morley on 1 slice whole grain bread  1 small peeled apple    Two- 250 calories, 16 g protein  1/3 cup lowfat cottage cheese    cup cooked green beans    small fruit cocktail (in natural juice)    Three- 200 calories, 11 g protein    grilled cheese sandwich on whole grain bread with lowfat cheese  2/3rd cup of tomato soup    Four- 250 calories, 22 g protein  Deli wrap: 1 oz sliced turkey, 1 oz sliced ham, 1 oz sliced chicken rolled up with 1 slice low-fat cheese  1 small orange    Five- 250 calories, 28 g protein  2/3rd cup chili with 1 oz shredded cheese  4 saltine crackers    Six- 250 calories, 22 g protein  1 cup fresh spinach with 2 oz chicken, 1/3rd cup mandarin oranges, and 2 tablespoons sliced almonds with 1 tablespoon  vinaigrette dressing    Seven- 200 calories, 11 g protein  1 Tbsp sugar-free preserves and 1 Tbsp peanut butter on 1 slice whole grain toast    cup nonfat/lowfat Greek yogurt    Eight- 250 calories, 18 g protein  1 small soft-shell chicken taco with 1 oz shredded cheese,  lettuce, tomato, salsa, and 1 Tbsp light sour cream    cup black beans    Nine- 225 calories, 13 g protein  2 ounces baked chicken  1/4 cup mashed potatoes    cup green beans    Ten- 200 calories, 21 g protein  Deli juan: 2 oz roast beef or other deli meat with 1 tsp [place] mayonnaise and sliced tomato, onion, and lettuce  1/3rd cup cottage cheese      Ten Dinners Under 300 calories    If you're eating a large breakfast and medium lunch, keep dinner small.  300-400 calories is ideal for most people depending on their caloric needs.    One- 300 calories, 12 g protein  1-inch thick slice of turkey meatloaf    cup baked butternut squash    Two- 200 calories, 9 g protein  Bread-less BLT: 3 slices turkey arguello, sliced tomato, wrapped in a large lettuce leaf    cup peeled fruit    Three- 275 calories, 36 g protein  3 oz roasted chicken    cup cooked broccoli    cup shredded cheddar cheese    cup unsweetened applesauce    Four- 200 calories, 25 g protein  3 oz baked tilapia  1/3rd cup cooked carrots    cup yogurt    Five- 250 calories, 20 g protein  Grilled ham  n  Swiss: spread 2 tsp ghee or butter on 1 slice of whole grain bread.  Cut bread in half, layer 2 oz deli ham with 1 piece of Swiss cheese and grill until cheese is melted.    cup cooked vegetables    Six- 250 calories, 18 g protein  Vegetarian cheeseburger: 1 [place] cheeseburger topped with lettuce, onion, tomato, and ketchup/mustard    cup sweet potato fries    Seven- 250 calories, 18 g protein  Pork pot roast: 2 oz roasted pork loin, 1/3rd cup roasted carrots,   medium potato, cooked with   cup gravy    Eight- 330 calories, 25 g protein  2 oz meatballs (about 2 small meatballs)    cup spaghetti sauce  1/2 piece toast topped with 1 tsp ghee or butterand topped with garlic powder, toasted in oven    Nine- 250 calories, 16 g protein  Mexican pizza: one 8  corn tortilla topped with 2 oz chicken,   cup salsa, 2 tablespoons black beans, 2 tablespoons shredded cheese.   Bake until cheese is melted.    Ten- 250 calories, 22 g protein  Shrimp stir-washburn: 3 oz cooked shrimp, 1/6th onion,   pepper,   cup chopped carrots sautéed in 1 tablespoon olive oil, topped with 2 tablespoons stir washburn sauce and a pinch of sesame seeds        150 Calories or Less Snack Ideas   1 hardboiled egg with   cup berries  1 small apple with 1 hardboiled egg  10 almonds with   cup berries  2 clementines with 1 light string cheese  1 light string cheese with   sliced apple  1 light string cheese wrapped in 2 slices of turkey  4 100% whole wheat crackers (e.g. Triscuit) with 1 light string cheese    c. cottage cheese with   cup fruit and 1 Tbsp sunflower seeds     cup cottage cheese with   of an avocado     can tuna fish with 1 cup sliced cucumbers     cup roasted garbanzo beans with paprika and cayenne pepper    baked sweet potato with   cup chili beans or   cup cottage cheese  2 oz. nitrate free turkey slices with 1 cup carrots  1 container (6 oz) of low sugar (less than 10 grams of sugar) greek yogurt   3 Tablespoons of hummus with 1 cup sliced bell peppers   2 Tablespoons of hummus with 15 baby carrots  4 Tablespoons ranch dip made with plain Greek Yogurt and 3 mini cucumbers  1/4 cup nuts (any kind)  1 Tablespoon peanut butter with 1 stalk celery   1 dill pickle wrapped in 1-2 slices of deli ham with 1 tsp of mayonnaise/mustard.      MEDICATIONS FOR WEIGHT LOSS  There are several medications available to assist us in weight loss.  By themselves, without a mindful change in diet and increase in movement/activity these medications are disappointing in their results. However, combined with a closely monitored program of diet change and exercise they can be very effective in controlling appetite and boosting initial weight loss.  All weight loss medications need continual re-evaluation for efficacy as their side effects and health benefits fail to be worthwhile if a person is not continuing to lose weight or in  maintaining their healthy weight.  Some weight loss medications are scheduled drugs, meaning there is at least a theoretical possibility for developing addiction to them, but in practice this is rare.  We do anticipate coming off meds in the future- after stabilization of weight loss is assurred.  Finally, a tolerance can develop and people s perceived efficacy of medication can diminish.  In communication with your physician, it may be appropriate to intermittently take a break from these medications and then restart again (few weeks off then restart again) if a plateau is reached that cannot be broken through.  Each person can respond to a medication differently and to be a good option for you, it will need to be affordable, effective and well tolerated with minimal side effects.    In most cases, weight loss progress after one month and three months will be obtained and if a patient is not reaching the satisfactory progress towards weight loss, the medications may be discontinued.  The thought is that if a person is taking a weight loss medication and not receiving the potential health benefit of that drug, the side effects are not worthwhile and use should be discontinued.  On the flip side, there are many people on some weight loss medications for years because it continues to be an effective tool in their weight management and they are tolerating the medication without any long-term side effects.  Each person's response and purpose will be evaluated.      PHENTERMINE (Adipex): approved in 1959 for appetite suppression.  It has stimulant effects and cannot be used with Ritalin, Concerta, or other stimulants.  Although it is not highly addictive, it's chemically related to amphetamines which are addictive and is classified as a Controlled Substance by the [place].  Occasional dependence can develop, but rarely. The most common side effects are dry mouth, increased energy and concentration, increased pulse, and  constipation.  You should not take phentermine if you have glaucoma, hyperthyroidism, or uncontrolled/untreated hypertension or overly anxious. You should stop if dramatic mood swings, severe insomnia, palpations, chest pains, visual changes or if your Blood Pressure is consistently elevated or any time it's over 160/90.   It's ok to go off the med for a few weeks and restart if efficacy is wearing off.  $24-$30 for 90 tablets at [facility]. Females are required to have reliable birth control to reduce the risk birth defects/miscarriage.      TOPIRAMATE (Topamax): Anti-seizure medication, also used to prevent migraines and sometimes for mood stabilization.  Side effects include paresthesia, glaucoma, altered concentration, attention difficulties, memory and speech problems, metabolic acidosis, depression, increase in body temperature and decrease sweating, risk of kidney stones.  Do not take Topamax while taking Depakote as this can cause high ammonia levels.  You must have reliable birth control as Topamax can cause birth defects.  If prolonged use has occurred it should be tapered off slowly to avoid withdrawal issues.  Insurance usually covers Topiramate.  At higher doses, there may be some confusion/forgetfulness associated with this so we try to limit dose to under 75mg twice daily to reduce this risk. Often covered by insurance as it's used for many reasons.  Topamax will cause carbonated beverages to taste bad. A recheck of your kidney/electrolytes may occur within a few months of starting.    QSYMIA (Phentermine + Topamax extended release):  See above information about phentermine and Topamax.  Most common side effects are paresthesia, dizziness, distortion of taste, insomnia, constipation, and dry mouth.  See above descriptions for the two individual agents.Females are required to have reliable birth control to reduce the risk birth defects/miscarriage.  $100-200 per month but coupons/programs exist at  Qsymia.com that may reduce costs depending on a patient's coverage. Has  a low, medium and higher dosing option and usually titrating upwards is expected for continued good benefit and consideration for tapering downward or using lower dose in stabilization phases.      GLP1 Agonists:  Liraglutide (Victoza/Saxenda), Semaglutide (Ozempic/Wegovy):   Part of the family of Glucagon Like Peptide Agonists, these medications directly suppresses appetite and are often used by diabetic patients due to improvements in glucose/insulin balance.  They also slow how quickly the stomach empties, increasing fullness. They may be hard to get covered for non diabetics and some plans have exclusions for weight loss purposes.  Currently, these are  injectable medications delivered via autoinjector pen. It can be very costly without insurance coverage (over $500/month).  Small risks for pancreatitis exists and dose should be held if increased mid abdominal pain/burning. It is not to be used if previous Multiple Endocrine Neoplasia. In rodents, may increase risk of thyroid tumors and not indicated for anyone with a history of medullary thyroid cancer as a result.  If changes in voice/swallowing should be discontinued. Reliable birth control required in women. [email or web address], Wegovy.com has more information on these medications.    Zepbound (Tirzepatide):  Similar in many ways to Wegovy/Saxenda type products, works by boosting satiety signals that improve sense of fullness/satiety, boosting both GIP and GLP1 hormones. Not to be used by those with Multiple endocrine neoplasia, medullary thyroid cancer and not likely tolerated well for those with recurrent/idiopathic pancreatitis issues. Costly without insurance coverage but very effective in curbing appetite. Once weekly injectable therapy. Nausea/upset stomach/constipation or diarrhea are common side effects. Heart burn can increase in some, as it slows stomach emptying speeds. Should be  "halted a few weeks prior to elective procedures and may require re-ramping afterwards. [employer] has good patient resources/information.    Contrave (Bupropion/Naltrexone).    Synergistic combination of a mild appetite suppressing anti-depressant (Bupropion) whose effects are increased due to interaction with Naltrexone.  Naltrexone may have some effects on craving and is often used in addiction medicine to help previous opiate addicts be less prone to relapse as it blocks the action of opiates. Should be stopped if any need for opiate pain medication, surgery or planned procedures where you'll be given sedation/anesthesia. If prolonged use, recommend stepping down bupropion over 2-3 weeks to limit any risk of withdrawal issues. Side effects may include dry mouth, increased heart rate, mild elevation in Blood pressure;  dizziness, ringing in the ears, anxiety (typically due to bupropion), nausea, constipation, and some get fatigued with naltrexone.  About $210 on [facility] for 120 tabs of \"Contrave\", the brand name without insurance coverage. Generic Bupropion 75mg: $25 for 120 tabs, Naltrexone: $55 for 90 tabs without insurance coverage on [employer]. Cannot be used if pregnant/trying to conceive or breast feeding.  Plenity:   NO LONGER AVAILABLE due to company going bankrupt. [email or web address] has more information.      Wegovy (semaglutide) is a very effective satiety boosting appetite suppressant. It needs to be ramped up slowly to be tolerated adequately.  About 1/10 people will not tolerate this medication. Each month, you move up to a higher dose until eventually reaching the 2.4mg/week dose if tolerated. When in plentiful supply, one try at re-ramping is recommended if the initial ramping isn't tolerated well and if that re-ramping isn't tolerated well, it's best to avoid this medication.       Wegovy starts at 0.25mg/week for 4 weeks then increases to 0.5mg/week for 4 weeks then 1mg/week for 4 weeks then " 1.7mg/week for 4 weeksthen 2.4mg/week usually for 6-9 months if tolerated well.  Injections can be given after cleansing the skin with alcohol prep pad or swab (available OTC).     Stop Wegovy if severe abdominal pain/vomiting/rash/throat swelling or constant nausea that prevents adequate food/water intake. Stop 2-3 weeks prior to any planned general anesthesia surgeries to reduce risk for something called a post operative ileus.     Gallstones can occur in about 1% of patients on this medication so update me if increase right upper abdominal pain after eating.     Start meals with protein first, separate beverages from meals by 20 minutes and work hard in between meals to get your 64-75 oz of water daily to reduce risks for severe constipation. Consider a fiber supplement like powdered psyllium husk in 12 oz water each night, stool softerners as needed and Miralax or milk of Magnesia if more than 3 days have passed without a Bowel Movement.     Check out [facility] for patient resources.  If you have weekends off, I recommend dosing Friday evenings.     Some people starve on this medication if not mindful about food intake. I recommend starting meals with the protein part of your meal first, chew thoroughly and separate beverages from meals by about 20 minutes to make sure you get your nourishment in first. Include vegetables/complex carbohydrates and unsaturated fat as part of your balanced diet but group these at the end of the meal, after protein is mostly gone. Satiety will kick in too early if drinking too much with meals and under nourishment can result.     It's not a bad idea to take a complete multivitamin most days of the week if using this medication.     Pancreatitis is a very rare but potentially serious side effect. Stop Wegovy if severe mid abdominal pain/burning in nature or if unable to eat/drink due to severe nausea/discomfort.   People with strong history of pancreatitis without clear cause  should stay clear of this medication as should those with strong personal or family history for medullary thyroid cancer or Multiple Endocrine Neoplasia (rare).     Kind Regards,  Guanaco Alvarez MD  [facility]      Exercise Guidance    Nearly everything that bothers us gets better when the proper amount of exercise can be done in the proper amounts.  Getting to that level safely and without injury is the key.  When it comes to weight loss, exercise is especially important in maintaining the weight loss.  Unfortunately, one of the harsh realities is that substantial weight loss slows our metabolism, often anywhere from 5-20%.    Our brain always remembers our heaviest weight and we can return to that if we're not mindful and moving regularly.  Our biology doesn't understand the concept of having too much energy, only not having enough.  As such, when we lose weight, it's thought that the brain interprets this as we're ill or in a famine and dials back our metabolism to limit further weight loss.  This is why exercise is so important in keeping the weight off and is the main reason people have some weight regain from their low weight point after weight loss.  We have to make up that 10-20% of calories not being burned.Since we can restrict our intake for only so long, exercise becomes very important in our long term healthy weigh maintenance to balance out the occasional indiscretion with our diet.    Generally, for every 5% body weight reduction in a weight loss season, a person needs to add  kilocalories of exercise in their daily routine to keep that weight off for the long term.  This is why it's vital to be starting your fitness regimen during weight loss season, so that routine is well established as you move into your maintenance period.    Additionally, all sorts of good enzymes and genes turn on with exercise and our stress, sleep, mood and bodies feel better when  we can get to the point of making ourselves a little sweaty and short of breath 35-50 minutes most days of the week. But we have to start with what we can do first and give ourselves permission to work our way up to this goal.    Who isn't ready for exercise? Well, if you get severe dizziness/palpitations, chest pain or short of breath/faint with even minimal activity like walking across a room or you're having to pause while going up a flight of stairs, then getting your heart and/or lungs fully evaluated prior to starting an exercise regimen is recommended. Everyone else can probably start a program, but everyone may start at a different point:  Some can set a 5-10 minute walking goal and others will be able to ride their bike for an hour.      Start with where you're at and look to add 10% more each week until you're at that 150 minutes or more a week (or 75 minutes/week or more of vigorous exercise). Moderate exercise can be estimated as the pace you can carry on a conversation and vigorous is the pace at which you can get 3-5 words out before having to take a breath.  If you're using heart rate monitoring, Moderate is about 60% of your maximum heart rate and vigorous about 75%. (Max heart rate estimated as 220 beats minus your age:  Example: 220-age of 42 =176 Beats per minute (BPM) maximum. 0.6X 176= 105 BPM (moderate), 132 BMP(vigorous)).    If you like to count steps, the 10,000 steps per day does correlate well with weight maintenance but try to make at least 20-25% of those steps at a brisk pace (like you are about to miss your bus).    Finally, if you are pressed for time, it's important to know that some exercise is better than none.  High Intensity Interval training (HIIT) is a good way to get as much out of a short period of working out. If you can't walk, use the stairs, bike or swim; you could use a punching/arm workout regimen for your activity.  The idea with HIIT is to have a 3-6 minute warm up  "period of low intensity and the 3-6 \"intervals\" where you push the intensity up and then recover and start the next interval. One study showed that 3 intervals of 20 seconds at \"Maximum Effort\" while either biking on a stationary bike or going up stairs and then having 100 seconds recovery time before the next Maximum Effort was equally as beneficial on cardiovascular fitness development as doing 30 minutes of moderately paced walking 3 days weekly over a 6 week period of time.  So intensity matters. You just need to be able to safely do your desired exercise without injury. There are many great HIIT exercises/routines out there. IF you're not doing much exercise currently, I recommend giving your self 2-3 weeks of moderate exercise, 3 days weekly minimum to get your bones/tendons/muscles used to exercise before going for High Intensity workouts.    If you like to use Apps on the phone, the couch to 5k refugio and 7 minute workout apps are nice places to start if you are reasonably healthy.  There are hundreds of other options out there.  Consider viewing [employer] if gentler exercise/movement is desired. Videos on [employer] and chair yoga for seniors exist and are free. Check them out and let's get that 3-4 days a week routine going.    Let's move!  Guanaco Alvarez MD.     "
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
## 2024-02-13 ENCOUNTER — TELEPHONE (OUTPATIENT)
Dept: SURGERY | Facility: CLINIC | Age: 29
End: 2024-02-13

## 2024-02-13 ENCOUNTER — VIRTUAL VISIT (OUTPATIENT)
Dept: SURGERY | Facility: CLINIC | Age: 29
End: 2024-02-13
Payer: COMMERCIAL

## 2024-02-13 DIAGNOSIS — E66.01 CLASS 2 SEVERE OBESITY DUE TO EXCESS CALORIES WITH SERIOUS COMORBIDITY AND BODY MASS INDEX (BMI) OF 36.0 TO 36.9 IN ADULT (H): ICD-10-CM

## 2024-02-13 DIAGNOSIS — E66.812 CLASS 2 SEVERE OBESITY DUE TO EXCESS CALORIES WITH SERIOUS COMORBIDITY AND BODY MASS INDEX (BMI) OF 36.0 TO 36.9 IN ADULT (H): ICD-10-CM

## 2024-02-13 DIAGNOSIS — R73.03 PREDIABETES: ICD-10-CM

## 2024-02-13 DIAGNOSIS — E66.9 OBESITY (BMI 30-39.9): Primary | ICD-10-CM

## 2024-02-13 PROCEDURE — 97802 MEDICAL NUTRITION INDIV IN: CPT | Mod: 95

## 2024-02-13 RX ORDER — SEMAGLUTIDE 1 MG/.5ML
1 INJECTION, SOLUTION SUBCUTANEOUS
Qty: 2 ML | Refills: 0 | Status: SHIPPED | OUTPATIENT
Start: 2024-04-09 | End: 2024-05-30 | Stop reason: DRUGHIGH

## 2024-02-13 RX ORDER — SEMAGLUTIDE 0.5 MG/.5ML
0.5 INJECTION, SOLUTION SUBCUTANEOUS
Qty: 2 ML | Refills: 0 | Status: SHIPPED | OUTPATIENT
Start: 2024-03-12 | End: 2024-04-19

## 2024-02-13 NOTE — PROGRESS NOTES
Sakshi White is a 28 year old who is being evaluated via a billable video visit.        How would you like to obtain your AVS? MyChart  If the video visit is dropped, the invitation should be resent by: Text to cell phone: [phone]  Will anyone else be joining your video visit? No          Medical Weight Loss Initial Diet Evaluation  Assessment:  This patient was referred by Dr. Alvarez for MNT as treatment for Obesity.    Sakshi is presenting today for a new weight management nutrition consultation. Pt has had an initial appointment with Dr. Alvarez.    Weight loss medication: Semaglutide.     Personal Goals: weight loss, pregnancy in the future and lower a1C     Anthropometrics:    Pt's weight is 197.5 lbs  Initial weight: 197.5 lbs  Weight change: n/a  BMI: There is no height or weight on file to calculate BMI.   Ideal body weight: 50.1 kg (110 lb 7.2 oz)  Adjusted ideal body weight: 65.9 kg (145 lb 4.3 oz)  Estimated RMR ([place]-St Jeor equation):  1570 kcals x 1.2 (sedentary) = 1885 kcals (for weight maintenance)    Recommended Protein Intake: 60-80 grams of protein/day    Medical History:  Patient Active Problem List   Diagnosis    PCOS (polycystic ovarian syndrome)    Hirsutism    Vitamin D deficiency    Obesity (BMI 30-39.9)    Primary anovulatory infertility    Migraine without aura and without status migrainosus, not intractable    Gastroesophageal reflux disease without esophagitis    Severe major depression, single episode (H)    Insomnia, unspecified type    Irregular menses    Bacterial vaginosis    Pre-diabetes    H. pylori infection    Snoring    Elevated prolactin level    Endometrial polyp    Female infertility    Obstruction of fallopian tube    Class 2 severe obesity due to excess calories with serious comorbidity in adult (H)          Nutrition History:   Food allergies/intolerances/cultural or religous food customs: No avoids dairy   Weight loss history: Patient reports she had  success with weight loss via exercise. Tried plant based diet but had re weight gain after stopping diet. She is concerned about her A1c and infertility. Patient is dissapointment with herself and became overwhelmed during visit to day. We kept goals at a minimum and focused on 2 large habits.  - Skips meals d/t lack of routine/hunger and fatigue.  - grazes on chips when she is hungry but doesn't want to cook      Vitamins/Mineral Supplementation: None at this time     Dietary Recall:  Wakes up at 7-10 am  Goes to bed 10pm-3am    Breakfast: eggs   Lunch: skips   Dinner: chicken with rice and veggies, meatloaf with corn and mashed potatoes, burgers, spaghetti with fried chicken with corn or string beans   Typical Snacks: chips,   Overnight eating: No  Eating out: 1-2x per week (fast food)     Beverages:   Soda (2x per week)  Oatmilk with cereal or protein shake   Tea with small amount of sugar 1 cup per day  Water 60oz    Exercise: no routine at this time.     Nutrition Diagnosis (PES statement):     Obesity related to excessive energy intake as evidence by patient subjective reports and BMI of 36.12     Disordered Eating Pattern (NB 1.5) related to intake of food exceeding RMR as evidenced by binge eating habits, frequent grazing, skipping meals    Nutrition Intervention  Food and/or Nutrient Delivery   Placed emphasis on importance of developing a healthy meal routine, aiming for 3 meals a day and no snacks.  Discussed using a protein supplement as a meal replacement.  Nutrition Education   Discussed with patient how to build a meal: the importance of including a lean/low fat protein at each meal, include a source of vegetables at a minimum of lunch and dinner and limiting carbohydrate intake to 25% per meal.  Educated on sources of lean protein, portion sizes, the amount of grams found in each source. Recommend patient to aim for 20-30g protein at each meal.  Discussed the importance of adequate hydration, with  emphasis on drinking 64oz of water or zero calorie beverages per day.  Nutrition Counseling   Encouraged importance of developing routine exercise for health benefits and weight loss.        Goals established by patient:   Breakfast to have 20g protein pair with fiber foods (enjoys fruit)  Have 2 meal 4-6 hours after breakfast (frozen, protein shake, leftovers or prepared meal)      Handouts provided:  Intro to MWM  Breakfast ideas    Assessment/Plan:    Pt will follow up in 3 month(s) with bariatrician and 1.5 month(s) with dietitian.         Video-Visit Details    Type of service:  Video Visit    Video Start Time (time video started): 11:26 AM    Video End Time (time video stopped): 12:09 AM    Originating Location (pt. Location): Home      Distant Location (provider location):  Off-site    Mode of Communication:  Video Conference via [facility]    Physician has received verbal consent for a Video Visit from the patient? Yes      Lisa White RD

## 2024-02-13 NOTE — PATIENT INSTRUCTIONS
Eat Better ? Move More ? Live Well    Eat 3 nutrient-rich meals each day     Don't skip meals--it will cause you to overeat later in the day!     Eating fiber (vegetables/fruits/whole grains) and protein with meals helps you stay full longer     Choose foods with less than 10 grams of sugar and 5 grams of fat per serving to prevent excess calories and weight re-gain   Eat around the same times each day to develop a routine eating schedule    Avoid snacking unless physically hungry.   Planned snacks: 1-2 times per day and no more than 150 calories    Eat protein first    Protein helps with healing, maintaining adequate muscle mass, reducing hunger and optimizing nutritional status    Aim for ____________________ grams of protein per day   Fill up on Fiber    Fiber comes from plants--fruits, veggies, whole grains, nuts/seeds and beans    Fiber is low in calories, high in phytonutrients and helps you stay full longer    Aim for 25-35 grams per day by eating fiber with meals and snacks  Eat [id number]    Take 20-30 minutes to eat each meal by taking small bites, chewing foods to applesauce consistency or 20-30 times before you swallow    Eating foods too fast can delay satiety/fullness signals and increase overeating   Slow down your eating by using toddler utensils, putting your fork/spoon down between bites and not watching TV or emailing during meals!   Keep a Journal          Writing down what you eat, how you feel and when you are active helps you identify new changes to work on from week to week          Look for ways to cut 100 calories from your current diet 2-3 times per day  Drink 64 ounces of 0-Calorie drinks between meals    Water    Zero calorie Propel  or Vitamin Water      SoBe Lif[place]ater  Zero Calories    Crystal Light , Sugar-Free Ant-Aid , and other sugar-free lemonade or flavored lemus    Keep Caffeine to less than 300mg per day ie: 3-6oz cups coffee     Work up to 45-60 minutes of physical activity  most days of the week    Helps with losing weight and prevent regaining those extra pounds!     Do a combo of cardio (walking/water exercises) and strength training (lifting weights/Vinyasa yoga)    Avoid Mindless Eating    Be present when you eat--take note of the smell, taste and quality of your food    Make a list of alternative activities you could do to prevent eating out of boredom/stress  Go for a walk, call a friend, chew gum, paint your nails, re-organize the garage, etc      LEAN PROTEIN SOURCES    Protein Source Portion Calories Grams of Protein                           Nonfat, plain Greek yogurt    (10 grams sugar or less) 3/4 cup (6 oz)  12-17   Light Yogurt (10 grams sugar or less) 3/4 cup (6 oz)  6-8   Protein Shake 1 shake 110-180 15-30   Skim/1% Milk or lactose-free milk 1 cup ( 8 oz)  8   Plain or light, flavored soymilk 1 cup  7-8   Plain or light, hemp milk 1 cup 110 6   Fat Free or 1% Cottage Cheese 1/2 cup 90 15   Part skim ricotta cheese 1/2 cup 100 14   Part skim or reduced fat cheese slices 1/4cup, 3 dice 65-80 8     Mozzarella String Cheese 1 80 8   Canned tuna, chicken, crab or salmon  (canned in water)  1/2 cup 100 15-20   White fish (broiled, grilled, baked) 3 ounces 100 21   Barney/Tuna (broiled, grilled, baked) 3 ounces 150-180 21   Shrimp, Scallops, Lobster, Crab 3 ounces 100 21   Pork loin, Pork Tenderloin 3 ounces 150 21   Boneless, skinless chicken /turkey breast                          (broiled, grilled, baked) 3 ounces 120 21   [place], [place], [place], and Venison 3 ounces 120 21   Lean cuts of red meat and pork (sirloin,   round, tenderloin, flank, ground 93%-96%) 3 ounces 170 21   Lean or Extra Lean Ground Turkey 1/2 cup 150 20   90-95% Lean [place] Burger 1 natalya 140-180 21   Low-fat casserole with lean meat 3/4 cup 200 17   Luncheon Meats                                                        (turkey, lean ham, roast beef, chicken) 3 ounces 100 21   Egg  (boiled, poached, scrambled) 1 Egg 60 7   Egg Substitute 1/2 cup 70 10   Nuts (limit to 1 serving per day)  3 Tbsp. 150 7   Nut Am[place] (peanut, almond)  Limit to 1 serving or less daily 1 Tbsp. 90 4   Soy Burger (varies) 1  10-15   Edamame  1/2 cup ~95 9   Garbanzo, Black, Avalos Beans 1/2 cup 110 7   Refried Beans 1/2 cup 100 7   Kidney and Lima beans 1/2 cup 110 7   Tempeh 3 oz 175 18   Vegan crumbles 1/2 cup 100 14   Tofu 1/2 cup 110 14   Chili (beans and extra lean beef or turkey) 1 cup 200 23   Lentil Stew/Soup 1 cup 150 12   Black Bean Soup 1 cup 175 12     Carbohydrates  Carbohydrates fuel your body with glucose (sugar)--the energy your body needs so you can do your daily activities.  Carbohydrates offer an immediate source of energy for your body. They provide the fuel for your muscles and organs, such as your brain.     Types of Carbohydrates     Complex Carbohydrates are higher in fiber and keep you feeling full longer--helping you eat less.   These are found in nearly all plant-based foods and usually take longer for the body to digest.  They are most commonly found in whole-wheat bread, whole-grain pasta, brown rice, starchy vegetables,   and fruits  Refined Carbohydrates require almost NO WORK for digestion and break down into glucose more quickly   than complex carbohydrates. Refined carbohydrates are usually high in calories and low in nutrients and fiber--  eating more of these can lead to weight gain.  Thinking about eliminating carbohydrates???  If you do not eat enough carbohydrates, the following can occur:  Fatigue  Muscle cramps  Poor mental function  Fatigue easily results from deprivation of carbohydrates, which is seen in people who fast, possibly interfering with activities of daily living.      Thinking about eliminating carbohydrates???  If you do not eat enough carbohydrates, the following can occur:  Fatigue  Muscle cramps  Poor mental function  Fatigue easily results from  deprivation of carbohydrates, which is seen in people who fast, possibly interfering with activities of daily living    Carbohydrates are your body's first choice for fuel. If given a choice of several types of foods simultaneously, your body will use the energy from carbohydrates first.    What foods contain carbohydrates?  Choose the following foods containing carbohydrates (the BEST ones to eat):   Fruit-fresh, frozen, canned in their own juices  Whole grains:  Whole-wheat breads  Brown rice  Oatmeal  Whole-grain cereals  Other starchy foods containing a minimum of 3 grams (g) fiber/100 calories  The ingredient label should list whole wheat or whole grain as one of the first ingredients (bulgur, quinoa, buckwheat, millet, spelt, faro, kasha)  Milk or yogurt (a natural source of carbohydrates):  Low-fat milk  Fat-free milk  Yogurt   Beans or legumes     Starchy vegetables, raw or frozen:  Potatoes  Peas  Corn    AVOID or limit the following foods containing carbohydrates:  Refined sugars, such as in:  Candy  Desserts-ice cream, cakes, pies, brownies, frozen yogurt, sherbet/sorbet  Cookies  White flour: bread/pasta/crackers/rice/tortillas  Sugary snacks: sweetened cereal, granola bars, cereal bars, donuts, muffins, bagels  Sugary Drinks:  Fruit Juice, Smoothies  Sports Drinks  Regular Soda    What are typical serving sizes or portions?  The following are some serving and portion sizes for foods containing carbohydrates:  One medium piece of fruit, about 4?5 ounces (oz) (-tennis ball)  1 cup (C) berries or melon    C canned fruit    C juice (100% vegetable)    C starchy vegetables, cooked or chopped  One slice whole-grain bread  ? C brown rice, quinoa, buckwheat, millet, spelt, faro, kasha    C oatmeal (dry)    C bulgur  One small tortilla (less than 6inch diameter)    C wheat germ  1 oz pretzels     C flaked cereal        Calorie-Controlled Sample Meal Plans    Examples of small healthy meals    Breakfast   Omelet  made with   cup to   cup egg substitute or 2 eggs    cup chopped vegetables  1-2 tbsp. of light cheese     cup salsa  Medium banana    1 cup non-fat plain, Greek yogurt mixed with 1 cup berries and 1-2 Tbsp nuts or cereal   -3/4 cup skim or 1% cottage cheese    cup unsweetened whole-grain cereal  1/2 cup of fresh strawberries  Whole-wheat English muffin or mini bagel, 1 scrambled egg and 1 slice Swiss cheese   Small orange  Protein Bar or Shake (15-30 grams protein and 15-25 grams Carbohydrates)    cup cottage cheese, low-fat    cup fresh fruit    11 ounces of Slim Fast Low Carb (only), Madeline's Advantage, EAS Carb Control    Lunch/Dinner  2-3 slices roasted turkey breast  1 tbsp. of fat free mayonnaise  2 slices of  whole-wheat bread, Medium apple  10 baby carrots with 1 tbsp. of low-fat dip     cup water packed tuna or chicken  1 tablespoons of low-fat mayonnaise  1-2 tbsp. dill relish  1 serving of whole-grain crackers  1 cup of strawberries   6 inch turkey sub sandwich with light mayonnaise,   cup cottage cheese                                                                                                                                                      Black bean and low-fat cheese on a whole wheat tortilla with salsa and light sour cream  Grilled chicken sandwich  Tossed salad with light dressing    Baked potato with 3/4 cup of extra lean ground beef, light shredded cheese and salsa  Fresh fruit                                                 Chicken chunks with lettuce and vegetables stuffed in juan  Steamed broccoli                                                 3 oz boneless/skinless chicken breast  1/2 cup brown rice with stir-fried vegetables    grapefruit  3 ounces of salmon, trout, or tuna  1 cup of steamed asparagus  1 small slice whole grain Italian bread  Broiled white or pink fish  3/4 cup whole wheat pasta with tomatoes  3/4 cup of roasted red peppers  3 oz. of extra lean (93/7) hamburger on a  "Arnold's [place] Thins  Tossed salad with light dressing       Black bean or Tuscan bean soup with grated mozzarella cheese    of a flour tortilla    3 ounces of grilled pork loin with 1 tbsp. of low-sugar barbeque sauce, 1 cup of green beans seasoned with pepper  Small dinner roll or   cup of grapefruit sections    1-2 cups of torn pollo    cup of garbanzo beans or diced skinless chicken breast  5-6 cherry tomatoes  1  tbsp. of crumbled feta cheese  1 tbsp. of roasted soy nuts  1 tsp. of olive oil and 2-3 Tbsp. of balsamic or red wine vinegar  Small whole-wheat dinner roll or   cup of cut up pineapple       High protein breakfast ideas:  -[place] products (high protein brand) - oatmeal, muffins, pancakes, etc.  -Overnight oats - there are easy \"just add water\" kinds in the grocery store or lots of recipes out there, look for one that has added protein from liquid or powder or other source  -Breakfast Egg Casseroles  -Scrambled eggs with cottage cheese whipped in  -English muffin breakfast sandwiches or burritos  -Frozen breakfast bowls OR \"Add an Egg\" bowls  -Protein bars - 10/10 rule is a good rule of thumb (Brands: [place] Protein Bar, RX bar, Skout Protein Bars)  -High protein tortillas or low carb tortilla for breakfast burrito  -Turkey, Veggie, Chicken, Black bean burgers in the frozen section - add cheese and fried egg on top  -Greek Yogurt (examples: plain greek, Oikos Triple Zero, Dannon Light N Fit, Two Good Yogurt), with choice of fresh or frozen fruit, zackary seeds, hemp seeds, nuts  -1 slice whole wheat bread with mini avocado or half regular sized avocado, \"Everything But the Bagel\" seasoning, Brazilian arguello on the side or on top  -2-3 light string cheeses with fruit (banana, fruit cup, berries, etc.)  -Cottage cheese and fruit on top  -Breakfast Skillet: sauteed bell peppers, onions with eggs and sprinkle of cheese (tip: batch prep sauteed peppers and onions for the week for a faster breakfast)   "

## 2024-02-13 NOTE — TELEPHONE ENCOUNTER
Message from the pharmacy saying they didn't have the 0.25 mg or the 0.5mg in stock was sent to us.  When I called the patient she said that she was just notified that they were able to fill the 0.25mg for her and it is ready for .  I cautioned her to make sure it is the 0.25mg and not the 1mg because we don't advise skipping doses. Adjusted the start dates on the dose increases to reflect proper start dates in the future.  Dania Mancilla RN

## 2024-02-13 NOTE — LETTER
2/13/2024         RE: Sakshi White  [address] [facility] [address]        Dear Colleague,    Thank you for referring your patient, Sakshi White, to the [facility] [place]. Please see a copy of my visit note below.    Sakshi White is a 28 year old who is being evaluated via a billable video visit.        How would you like to obtain your AVS? MyChart  If the video visit is dropped, the invitation should be resent by: Text to cell phone: [phone]  Will anyone else be joining your video visit? No          Medical Weight Loss Initial Diet Evaluation  Assessment:  This patient was referred by Dr. Alvarez for MNT as treatment for Obesity.    Sakshi is presenting today for a new weight management nutrition consultation. Pt has had an initial appointment with Dr. Alvarez.    Weight loss medication: Semaglutide.     Personal Goals: weight loss, pregnancy in the future and lower a1C     Anthropometrics:    Pt's weight is 197.5 lbs  Initial weight: 197.5 lbs  Weight change: n/a  BMI: There is no height or weight on file to calculate BMI.   Ideal body weight: 50.1 kg (110 lb 7.2 oz)  Adjusted ideal body weight: 65.9 kg (145 lb 4.3 oz)  Estimated RMR (Garland-St Jeor equation):  1570 kcals x 1.2 (sedentary) = 1885 kcals (for weight maintenance)    Recommended Protein Intake: 60-80 grams of protein/day    Medical History:  Patient Active Problem List   Diagnosis     PCOS (polycystic ovarian syndrome)     Hirsutism     Vitamin D deficiency     Obesity (BMI 30-39.9)     Primary anovulatory infertility     Migraine without aura and without status migrainosus, not intractable     Gastroesophageal reflux disease without esophagitis     Severe major depression, single episode (H)     Insomnia, unspecified type     Irregular menses     Bacterial vaginosis     Pre-diabetes     H. pylori infection     Snoring     Elevated prolactin level     Endometrial polyp      Female infertility     Obstruction of fallopian tube     Class 2 severe obesity due to excess calories with serious comorbidity in adult (H)          Nutrition History:   Food allergies/intolerances/cultural or religous food customs: No avoids dairy   Weight loss history: Patient reports she had success with weight loss via exercise. Tried plant based diet but had re weight gain after stopping diet. She is concerned about her A1c and infertility. Patient is dissapointment with herself and became overwhelmed during visit to day. We kept goals at a minimum and focused on 2 large habits.  - Skips meals d/t lack of routine/hunger and fatigue.  - grazes on chips when she is hungry but doesn't want to cook      Vitamins/Mineral Supplementation: None at this time     Dietary Recall:  Wakes up at 7-10 am  Goes to bed 10pm-3am    Breakfast: eggs   Lunch: skips   Dinner: chicken with rice and veggies, meatloaf with corn and mashed potatoes, burgers, spaghetti with fried chicken with corn or string beans   Typical Snacks: chips,   Overnight eating: No  Eating out: 1-2x per week (fast food)     Beverages:   Soda (2x per week)  Oatmilk with cereal or protein shake   Tea with small amount of sugar 1 cup per day  Water 60oz    Exercise: no routine at this time.     Nutrition Diagnosis (PES statement):     Obesity related to excessive energy intake as evidence by patient subjective reports and BMI of 36.12     Disordered Eating Pattern (NB 1.5) related to intake of food exceeding RMR as evidenced by binge eating habits, frequent grazing, skipping meals    Nutrition Intervention  Food and/or Nutrient Delivery   Placed emphasis on importance of developing a healthy meal routine, aiming for 3 meals a day and no snacks.  Discussed using a protein supplement as a meal replacement.  Nutrition Education   Discussed with patient how to build a meal: the importance of including a lean/low fat protein at each meal, include a source of  vegetables at a minimum of lunch and dinner and limiting carbohydrate intake to 25% per meal.  Educated on sources of lean protein, portion sizes, the amount of grams found in each source. Recommend patient to aim for 20-30g protein at each meal.  Discussed the importance of adequate hydration, with emphasis on drinking 64oz of water or zero calorie beverages per day.  Nutrition Counseling   Encouraged importance of developing routine exercise for health benefits and weight loss.        Goals established by patient:   Breakfast to have 20g protein pair with fiber foods (enjoys fruit)  Have 2 meal 4-6 hours after breakfast (frozen, protein shake, leftovers or prepared meal)      Handouts provided:  Intro to [facility]  Breakfast ideas    Assessment/Plan:    Pt will follow up in 3 month(s) with bariatrician and 1.5 month(s) with dietitian.         Video-Visit Details    Type of service:  Video Visit    Video Start Time (time video started): 11:26 AM    Video End Time (time video stopped): 12:09 AM    Originating Location (pt. Location): Home      Distant Location (provider location):  Off-site    Mode of Communication:  Video Conference via [facility]    Physician has received verbal consent for a Video Visit from the patient? Yes      Lisa White RD         Again, thank you for allowing me to participate in the care of your patient.        Sincerely,        Lisa White RD

## 2024-02-16 ASSESSMENT — SLEEP AND FATIGUE QUESTIONNAIRES
HOW LIKELY ARE YOU TO NOD OFF OR FALL ASLEEP WHILE WATCHING TV: WOULD NEVER DOZE
HOW LIKELY ARE YOU TO NOD OFF OR FALL ASLEEP WHEN YOU ARE A PASSENGER IN A CAR FOR AN HOUR WITHOUT A BREAK: SLIGHT CHANCE OF DOZING
HOW LIKELY ARE YOU TO NOD OFF OR FALL ASLEEP IN A CAR, WHILE STOPPED FOR A FEW MINUTES IN TRAFFIC: WOULD NEVER DOZE
HOW LIKELY ARE YOU TO NOD OFF OR FALL ASLEEP WHILE SITTING AND TALKING TO SOMEONE: WOULD NEVER DOZE
HOW LIKELY ARE YOU TO NOD OFF OR FALL ASLEEP WHILE LYING DOWN TO REST IN THE AFTERNOON WHEN CIRCUMSTANCES PERMIT: SLIGHT CHANCE OF DOZING
HOW LIKELY ARE YOU TO NOD OFF OR FALL ASLEEP WHILE SITTING AND READING: SLIGHT CHANCE OF DOZING
HOW LIKELY ARE YOU TO NOD OFF OR FALL ASLEEP WHILE SITTING INACTIVE IN A PUBLIC PLACE: WOULD NEVER DOZE
HOW LIKELY ARE YOU TO NOD OFF OR FALL ASLEEP WHILE SITTING QUIETLY AFTER LUNCH WITHOUT ALCOHOL: WOULD NEVER DOZE

## 2024-02-23 ENCOUNTER — THERAPY VISIT (OUTPATIENT)
Dept: SLEEP MEDICINE | Facility: CLINIC | Age: 29
End: 2024-02-23
Payer: COMMERCIAL

## 2024-02-23 ENCOUNTER — OFFICE VISIT (OUTPATIENT)
Dept: SLEEP MEDICINE | Facility: CLINIC | Age: 29
End: 2024-02-23
Attending: INTERNAL MEDICINE
Payer: COMMERCIAL

## 2024-02-23 VITALS
HEART RATE: 72 BPM | HEIGHT: 62 IN | SYSTOLIC BLOOD PRESSURE: 112 MMHG | DIASTOLIC BLOOD PRESSURE: 75 MMHG | WEIGHT: 197 LBS | BODY MASS INDEX: 36.25 KG/M2 | OXYGEN SATURATION: 99 % | RESPIRATION RATE: 16 BRPM

## 2024-02-23 DIAGNOSIS — R53.82 CHRONIC FATIGUE: ICD-10-CM

## 2024-02-23 DIAGNOSIS — E66.812 OBESITY, CLASS II, BMI 35-39.9: ICD-10-CM

## 2024-02-23 DIAGNOSIS — R06.83 SNORING: ICD-10-CM

## 2024-02-23 DIAGNOSIS — G47.00 INSOMNIA, UNSPECIFIED TYPE: ICD-10-CM

## 2024-02-23 DIAGNOSIS — R06.83 SNORING: Primary | ICD-10-CM

## 2024-02-23 DIAGNOSIS — F41.9 ANXIETY: ICD-10-CM

## 2024-02-23 PROCEDURE — 95810 POLYSOM 6/> YRS 4/> PARAM: CPT | Performed by: INTERNAL MEDICINE

## 2024-02-23 PROCEDURE — 99204 OFFICE O/P NEW MOD 45 MIN: CPT

## 2024-02-23 RX ORDER — DIPHENHYDRAMINE HCL 25 MG
50 TABLET ORAL AT BEDTIME
COMMUNITY

## 2024-02-23 RX ORDER — ZOLPIDEM TARTRATE 5 MG/1
TABLET ORAL
Qty: 1 TABLET | Refills: 0 | Status: SHIPPED | OUTPATIENT
Start: 2024-02-23 | End: 2024-04-19

## 2024-02-23 ASSESSMENT — SLEEP AND FATIGUE QUESTIONNAIRES
HOW LIKELY ARE YOU TO NOD OFF OR FALL ASLEEP WHEN YOU ARE A PASSENGER IN A CAR FOR AN HOUR WITHOUT A BREAK: SLIGHT CHANCE OF DOZING
HOW LIKELY ARE YOU TO NOD OFF OR FALL ASLEEP WHILE SITTING QUIETLY AFTER LUNCH WITHOUT ALCOHOL: SLIGHT CHANCE OF DOZING
HOW LIKELY ARE YOU TO NOD OFF OR FALL ASLEEP WHILE SITTING AND READING: WOULD NEVER DOZE
HOW LIKELY ARE YOU TO NOD OFF OR FALL ASLEEP WHILE WATCHING TV: SLIGHT CHANCE OF DOZING
HOW LIKELY ARE YOU TO NOD OFF OR FALL ASLEEP WHILE SITTING INACTIVE IN A PUBLIC PLACE: WOULD NEVER DOZE
HOW LIKELY ARE YOU TO NOD OFF OR FALL ASLEEP IN A CAR, WHILE STOPPED FOR A FEW MINUTES IN TRAFFIC: WOULD NEVER DOZE
HOW LIKELY ARE YOU TO NOD OFF OR FALL ASLEEP WHILE SITTING AND TALKING TO SOMEONE: WOULD NEVER DOZE
HOW LIKELY ARE YOU TO NOD OFF OR FALL ASLEEP WHILE LYING DOWN TO REST IN THE AFTERNOON WHEN CIRCUMSTANCES PERMIT: MODERATE CHANCE OF DOZING
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
## 2024-02-23 NOTE — PATIENT INSTRUCTIONS
General recommendations for sleep problems (Insomnia)  Allow 2-4 weeks to see results     Establish a regular sleep schedule    Most people only need 7-8 hours of sleep.  Don't be in bed longer than you need     to sleep or you will end up spending more time awake in bed. This trains your    brain to think of the bed as a place to not sleep.  Go to bed at same time each night   Get up at same time each day - Set an alarm everyday (even weekends). This is one of    the most important tips. It prevents you from relying on your insomnia to get you    up on time for your day. That actually reinforces insomnia. It also will help your    body get into a pattern where you start feeling tired at a consistent time each    night.  The body functions best when you keep a consistent routine.  Avoid sleeping-in and napping. Anytime you sleep during the day, you will be less tired at    night. You may be tired enough to fall asleep, but you will wake more in the    middle of the night because you will have met your sleep need before the night is    done.   Cut down time in bed (if not asleep, get up)- Use your bed only for sleep and sex    Anytime you spend time in bed doing activities other than sleep (reading,    watching TV, working, playing on the computer or phone, or even just laying in    bed trying to sleep), you are training your brain to think of the bed as a place to    do activities other than sleep. If you are not falling asleep within 20-30 minutes,    get out of bed. While out of bed, avoid bright lights. Avoid work or chores. Being    productive in the middle of the night reinforces waking up at night. Find relaxing,    not particularly entertaining activities like reading, listening to music, or relaxation    exercises. Go back to bed if you start feeling groggy, or after about 30 minutes,    even if not feeling very tired. Sometimes, just getting out of bed stops the pattern    of getting frustrated about laying  in bed not sleeping, and that can help you fall    asleep.   Avoid trying to force yourself to sleep- sleep is not like everything else. The harder you    work at most things, the more you can accomplish. The harder you work at    sleep, the less you will sleep.     Make the bedroom comfortable - quiet, dark and cool are better. Consider ear plugs    (silicon). Use dark blinds or wear an eye mask if needed     Make a relaxing routine prior to bedtime  Relaxation exercises:   Progressive muscle relaxation: Relax each muscle group individually    Begin with your feet, flex, then relax. Try to imagine your feet feeling heavy and sinking into the bed. Move to your calves, do the same thing. Work through each muscle group toward your head.    Relaxing Mental Imagery: Try to imagine a trip that you took and found relaxing, or imagine a day at the beach. Try to walk yourself through the day in your mind as if you were dreaming it. Try to imagine sensing the different experiences, such as feeling sand between your toes, the heat of the sun on your skin, seeing the waves crashing the shore, the smell of the salt water, etc.     Deal with your worries before bedtime    Set aside a worry time around dinner time for 10-15 minutes. Write down the    things that are on your mind. Plan time in the coming days to address those    issues. Brainstorm ideas on how you will deal with them. Try to identify issues    that are out of your control, and try to let those issues go.  Listen to relaxation tapes   Classical Music or Nature sounds   Back Massage   Get regular exercise each day (at least 1-2 hours before bedtime)   Take medications only as directed   Eat a light bedtime snack or warm drink   Warm milk   Warm herbal tea (non-caffeinated)     Things to avoid   No overstimulating activities just before bed   No competitive games before bedtime   No exciting television programs before bedtime   Avoid caffeine after lunchtime   Avoid  chocolate   Do not use alcohol to induce sleep (worsens Insomnia)   Do not take someone else's sleeping pills   Do not look at the clock when awakening   Do not turn on light when getting up to use bathroom, use a nightlight     Online Programs   www.SHUTi.me (pronounced shut eye). There is a fee for this program. Enter the code  [place]  if you decide to enroll in this program.    www.sleepIO.com (pronounced sleep ee oh). There is a fee for this program. Enter the code  [place]  if you decide to enroll in this program.     Suggested Resources  Insomnia Treatment Books   Overcoming Insomnia by Kevin Slaughter and Susan Aguiar (2008)  No More Sleepless Nights by Natanael Stevens and Sommer Pendleton (1996)  Say Stefania to Insomnia by Richard Zuniga (2009)  The Insomnia Workbook by Kamilla Gaxiola and Richar Jacobson (2009)  The Insomnia Answer by Michael Flowers and Kevin Arellano (2006)    Stress Management and Relaxation Books  The Relaxation and Stress Reduction Workbook by Katie Weiner, Jessica Pino and Melecio Mckeon (2008)  Stress Management Workbook: Techniques and Self-Assessment Procedures by Arielle Padilla and Matthew Hendrix (1997)  A Mindfulness-Based Stress Reduction Workbook by Armando Rosenthal and Pamela Fletcher (2010)  The Complete Stress Management Workbook by Ghanshyam Gonzáles, Aman Cain and Mac Cuenca (1996)  Assert Yourself by Kenna Valentine and Cristhian Valentine (1977)    Relaxation Resources for Computer Download   These websites offer resources to help you relax. This list is for information only. [place] is not responsible for the quality of services or the actions of any person or organization.  Progressive Muscle Relaxation (PMR):   http://www.innerBookingPalstudio.com/progressive-muscle-relaxation-exercise.html   http://studentsupport.[facility]/counseling/resources/self-help/relaxation-and-stress-management/   Deep Breathing  Exercises:  http://www.Access Point/breathing-awareness.html     Meditation:   www.[employer] (ROOF)rantheSantoSolve.[employer]  www.theIntrinsiq Materialsguided-meditation-site.com You may have to pay for some of these resources.    Guided Imagery:  http://www.Access Point/guided-imagery-scripts.html   http://[employer]/library/xqrqpskepw-yfcyra-sxwqgzo/   Consider phone apps such as: Calm, Headspace or Insight Timer.    Counseling / Behavioral Health  [facility]  Visit [email or web address] or call [phone] to find a clinic close to you.  Or call [phone] for [facility].                                             Online CBT-I    Your health care provider has recommended our online cognitive-behavioral therapy program for insomnia (online CBT-I).  Our online CBT-I program is a collaboration between [facility] and the [facility], developers of the GO! To Sleep program.  The program followsinsomnia treatment strategies and recommendations similar to those used in our [facility].     How It works:    Research indicates that online CBT-I can be as effective as in-person therapy.  The Go! to Sleep program uses proven methods to help you improves your sleep from the comfort and privacy of your own home.  A recent study in the journal SLEEP found that 91% of patients who completed a five-week online program for insomnia reported improvement in sleep.    What You Get:    The cost for an entire 6 week program is $40.  For this fee you will have an online guide to learning how to improve your sleep using daily sleep logs that help individualize your program.  You will learn the basic science of sleep and why certain behaviors can be detrimental to your sleep.  You will also be given activities to help you get the sleep you needs including specially crafted audio relaxation practices that will help you manage stress and improve your sleep.    Who its for:  "    GO! To Sleep was designed for those experiencing short-term  insomnia and long-term insomnia lasting more than six months.  If you use sleep medication on an occasional basis, the program will help you learn techniques that will help you sleep better without medication.      Click on the link below to get started today:                           [email or web address]             Once you are registered you can share your sleep log data with [facility] where your health provider will be able to review your sleep log and progress.  Once you begin the program, go to the left side navigation bar and click on the  share sleep log  button:                                        This takes you to the next page where you enter the provider code [place] and click Locate:                 This brings you to the verification page where you should see  [facility] identified as your provider                                                                           By clicking \"Submit\" your sleep log data will be sent to our secure  [facility] portal for review by you provider.     For Help Contact Customer Care At:    [email or web address]             MY TREATMENT INFORMATION FOR SLEEP APNEA-  Sakshi White    DOCTOR : NATALYA Siegel CNP    Am I having a sleep study at a sleep center?  --->Due to normal delays, you will be contacted within 2-4 weeks to schedule    Am I having a home sleep study?  --->Watch the video for the device you are using:    -/drop off device-   https://www.[employer].com/watch?v=yGGFBdELGhk    Frequently asked questions:  1. What is Obstructive Sleep Apnea (JOSE)? JOSE is the most common type of sleep apnea. Apnea means, \"without breath.\"  Apnea is most often caused by narrowing or collapse of the upper airway as muscles relax during sleep.   Almost everyone has occasional apneas. Most people with sleep apnea have had brief " interruptions at night frequently for many years.  The severity of sleep apnea is related to how frequent and severe the events are.   2. What are the consequences of JOSE? Symptoms include: feeling sleepy during the day, snoring loudly, gasping or stopping of breathing, trouble sleeping, and occasionally morning headaches or heartburn at night.  Sleepiness can be serious and even increase the risk of falling asleep while driving. Other health consequences may include development of high blood pressure and other cardiovascular disease in persons who are susceptible. Untreated JOSE can contribute to heart disease, stroke and diabetes.   3. What are the treatment options? In most situations, sleep apnea is a lifelong disease that must be managed with daily therapy. Medications are not effective for sleep apnea and surgery is generally not considered until other therapies have been tried. Your treatment is your choice . Continuous Positive Airway (CPAP) works right away and is the therapy that is effective in nearly everyone. An oral device to hold your jaw forward is usually the next most reliable option. Other options include postioning devices (to keep you off your back), weight loss, and surgery including a tongue pacing device. There is more detail about some of these options below.  4. Are my sleep studies covered by insurance? Although we will request verification of coverage, we advise you also check in advance of the study to ensure there is coverage.    Important tips for those choosing CPAP and similar devices  For new devices, sign up for device REFUGIO to monitor your device for your followup visits  We encourage you to utilize the [employer] refugio or website ([employer] web [employer]) to monitor your therapy progress and share the data with your healthcare team when you discuss your sleep apnea.                                                    Know your equipment:  CPAP is continuous positive airway pressure  that prevents obstructive sleep apnea by keeping the throat from collapsing while you are sleeping. In most cases, the device is  smart  and can slowly self-adjusts if your throat collapses and keeps a record every day of how well you are treated-this information is available to you and your care team.  BPAP is bilevel positive airway pressure that keeps your throat open and also assists each breath with a pressure boost to maintain adequate breathing.  Special kinds of BPAP are used in patients who have inadequate breathing from lung or heart disease. In most cases, the device is  smart  and can slowly self-adjusts to assist breathing. Like CPAP, the device keeps a record of how well you are treated.  Your mask is your connection to the device. You get to choose what feels most comfortable and the staff will help to make sure if fits. Here: are some examples of the different masks that are available: Magnetic mask aids may assist with use but there are safety issues that should be addressed when considering with magnets* (see end of discussion).       Key points to remember on your journey with sleep apnea:  Sleep study.  PAP devices often need to be adjusted during a sleep study to show that they are effective and adjusted right.  Good tips to remember: Try wearing just the mask during a quiet time during the day so your body adapts to wearing it. A humidifier is recommended for comfort in most cases to prevent drying of your nose and throat. Allergy medication from your provider may help you if you are having nasal congestion.  Getting settled-in. It takes more than one night for most of us to get used to wearing a mask. Try wearing just the mask during a quiet time during the day so your body adapts to wearing it. A humidifier is recommended for comfort in most cases. Our team will work with you carefully on the first day and will be in contact within 4 days and again at 2 and 4 weeks for advice and remote  device adjustments. Your therapy is evaluated by the device each day.   Use it every night. The more you are able to sleep naturally for 7-8 hours, the more likely you will have good sleep and to prevent health risks or symptoms from sleep apnea. Even if you use it 4 hours it helps. Occasionally all of us are unable to use a medical therapy, in sleep apnea, it is not dangerous to miss one night.   Communicate. Call our skilled team on the number provided on the first day if your visit for problems that make it difficult to wear the device. Over 2 out of 3 patients can learn to wear the device long-term with help from our team. Remember to call our team or your sleep providers if you are unable to wear the device as we may have other solutions for those who cannot adapt to mask CPAP therapy. It is recommended that you sleep your sleep provider within the first 3 months and yearly after that if you are not having problems.   Use it for your health. We encourage use of CPAP masks during daytime quiet periods to allow your face and brain to adapt to the sensation of CPAP so that it will be a more natural sensation to awaken to at night or during naps. This can be very useful during the first few weeks or months of adapting to CPAP though it does not help medically to wear CPAP during wakefulness and  should not be used as a strategy just to meet guidelines.  Take care of your equipment. Make sure you clean your mask and tubing using directions every day and that your filter and mask are replaced as recommended or if they are not working.     *Masks with magnets:  Updated Contraindications  Masks with magnetic components are contraindicated for use by patients where they, or anyone in close physical contact while using the mask, have the following:   Active medical implants that interact with magnets (i.e., pacemakers, implantable cardioverter defibrillators (ICD), neurostimulators, cerebrospinal fluid (CSF) shunts,  insulin/infusion pumps)   Metallic implants/objects containing ferromagnetic material (i.e., aneurysm clips/flow disruption devices, embolic coils, stents, valves, electrodes, implants to restore hearing or balance with implanted magnets, ocular implants, metallic splinters in the eye)  Updated Warning  Keep the mask magnets at a safe distance of at least 6 inches (150 mm) away from implants or medical devices that may be adversely affected by magnetic interference. This warning applies to you or anyone in close physical contact with your mask. The magnets are in the frame and lower headgear clips, with a magnetic field strength of up to 400mT. When worn, they connect to secure the mask but may inadvertently detach while asleep.  Implants/medical devices, including those listed within contraindications, may be adversely affected if they change function under external magnetic fields or contain ferromagnetic materials that attract/repel to magnetic fields (some metallic implants, e.g., contact lenses with metal, dental implants, metallic cranial plates, screws, eligio hole covers, and bone substitute devices). Consult your physician and  of your implant / other medical device for information on the potential adverse effects of magnetic fields.    BESIDES CPAP, WHAT OTHER THERAPIES ARE THERE?    Positioning Device  Positioning devices are generally used when sleep apnea is mild and only occurs on your back.This example shows a pillow that straps around the waist. It may be appropriate for those whose sleep study shows milder sleep apnea that occurs primarily when lying flat on one's back. Preliminary studies have shown benefit but effectiveness at home may need to be verified by a home sleep test. These devices are generally not covered by medical insurance.  Examples of devices that maintain sleeping on the back to prevent snoring and mild sleep apnea.    Belt type body  positioner  http://Recochem.[employer]/    Electronic reminder  http://nightshifttherapy.com/            Oral Appliance  What is oral appliance therapy?  An oral appliance device fits on your teeth at night like a retainer used after having braces. The device is made by a specialized dentist and requires several visits over 1-2 months before a manufactured device is made to fit your teeth and is adjusted to prevent your sleep apnea. Once an oral device is working properly, snoring should be improved. A home sleep test may be recommended at that time if to determine whether the sleep apnea is adequately treated.       Some things to remember:  -Oral devices are often, but not always, covered by your medical insurance. Be sure to check with your insurance provider.   -If you are referred for oral therapy, you will be given a list of specialized dentists to consider or you may choose to visit the Web site of the American Academy of Dental Sleep Medicine  -Oral devices are less likely to work if you have severe sleep apnea or are extremely overweight.     More detailed information  An oral appliance is a small acrylic device that fits over the upper and lower teeth  (similar to a retainer or a mouth guard). This device slightly moves jaw forward, which moves the base of the tongue forward, opens the airway, improves breathing for effective treat snoring and obstructive sleep apnea in perhaps 7 out of 10 people .  The best working devices are custom-made by a dental device  after a mold is made of the teeth 1, 2, 3.  When is an oral appliance indicated?  Oral appliance therapy is recommended as a first-line treatment for patients with primary snoring, mild sleep apnea, and for patients with moderate sleep apnea who prefer appliance therapy to use of CPAP4, 5. Severity of sleep apnea is determined by sleep testing and is based on the number of respiratory events per hour of sleep.   How successful is oral appliance  therapy?  The success rate of oral appliance therapy in patients with mild sleep apnea is 75-80% while in patients with moderate sleep apnea it is 50-70%. The chance of success in patients with severe sleep apnea is 40-50%. The research also shows that oral appliances have a beneficial effect on the cardiovascular health of JOSE patients at the same magnitude as CPAP therapy7.  Oral appliances should be a second-line treatment in cases of severe sleep apnea, but if not completely successful then a combination therapy utilizing CPAP plus oral appliance therapy may be effective. Oral appliances tend to be effective in a broad range of patients although studies show that the patients who have the highest success are females, younger patients, those with milder disease, and less severe obesity. 3, 6.   Finding a dentist that practices dental sleep medicine  Specific training is available through the American Academy of Dental Sleep Medicine for dentists interested in working in the field of sleep. To find a dentist who is educated in the field of sleep and the use of oral appliances, near you, visit the Web site of the American Academy of Dental Sleep Medicine.    References  1. Netta et al. Objectively measured vs self-reported compliance during oral appliance therapy for sleep-disordered breathing. Chest 2013; 144(5): 2406-7248.  2. Max et al. Objective measurement of compliance during oral appliance therapy for sleep-disordered breathing. Thorax 2013; 68(1): 91-96.  3. Jose Manuel et al. Mandibular advancement devices in 620 men and women with JOSE and snoring: tolerability and predictors of treatment success. Chest 2004; 125: 2818-2449.  4. Luis, et al. Oral appliances for snoring and JOSE: a review. Sleep 2006; 29: 244-262.  5. Anu et al. Oral appliance treatment for JOSE: an update. J Clin Sleep Med 2014; 10(2): 215-227.  6. Scarlet et al. Predictors of OSAH treatment outcome. J [place] Res  2007; 86: 2150-6711.      Weight Loss: Your Body mass index is 36.03 kg/m .    Being overweight does not necessarily mean you will have health consequences.  Those who have BMI over 35 or over 27 with existing medical conditions carries greater risk.   Weight loss decreases severity of sleep apnea in most people with obesity. For those with mild obesity who have developed snoring with weight gain, even 15-30 pound weight loss can improve and occasionally milder eliminate sleep apnea.  Structured and life-long dietary and health habits are necessary to lose weight and keep healthier weight levels.     The Comprehensive Weight loss program offers all aspects of weight loss strategies including two Non-Surgical Weight Loss Programs: Medical Weight Management and our 24 Week Healthy Lifestyle Program:    Medical Weight Management: You will meet with a Medical Weight Management Provider, as well as a Registered Dietician. The program may include medication therapy, dietary education, recommended exercise and physical therapy programs, monthly support group meetings, and possible psychological counseling. Follow up visits with the provider or dietician are scheduled based on your progress and needs.    24 Week Healthy Lifestyle Program: This unique program is designed to give you the support of weekly appointments and activities thru a 24-week period. It may include all of the components of the basic program (above), with the addition of 11 individual Health  Visits, 24-week access to the [employer] website for over 700 online classes, and monthly support group meetings. This program has an out-of-pocket expense of $499 to cover the items that can not be billed to insurance (health coaches and [employer] access), and is non-refundable/non-transferable (you may be able to use a Health Savings Account; ask your HSA provider). There may be an optional meal replacement plan prescribed as well.   Surgical management  achieves meaningful long-term weight loss and improvement in health risks in most patients with more severe obesity.      Sleep Apnea Surgery:    Surgery for obstructive sleep apnea is considered generally only when other therapies fail to work. Surgery may be discussed with you if you are having a difficult time tolerating CPAP and or when there is an abnormal structure that requires surgical correction.  Nose and throat surgeries often enlarge the airway to prevent collapse.  Most of these surgeries create pain for 1-2 weeks and up to half of the most common surgeries are not effective throughout life.  You should carefully discuss the benefits and drawbacks to surgery with your sleep provider and surgeon to determine if it is the best solution for you.   More information  Surgery for JOSE is directed at areas that are responsible for narrowing or complete obstruction of the airway during sleep.  There are a wide range of procedures available to enlarge and/or stabilize the airway to prevent blockage of breathing in the three major areas where it can occur: the palate, tongue, and nasal regions.  Successful surgical treatment depends on the accurate identification of the factors responsible for obstructive sleep apnea in each person.  A personalized approach is required because there is no single treatment that works well for everyone.  Because of anatomic variation, consultation with an examination by a sleep surgeon is a critical first step in determining what surgical options are best for each patient.  In some cases, examination during sedation may be recommended in order to guide the selection of procedures.  Patients will be counseled about risks and benefits as well as the typical recovery course after surgery. Surgery is typically not a cure for a person s JOSE.  However, surgery will often significantly improve one s JOSE severity (termed  success rate ).  Even in the absence of a cure, surgery will decrease  the cardiovascular risk associated with OSA7; improve overall quality of life8 (sleepiness, functionality, sleep quality, etc).    Palate Procedures:  Patients with JOSE often have narrowing of their airway in the region of their tonsils and uvula.  The goals of palate procedures are to widen the airway in this region as well as to help the tissues resist collapse.  Modern palate procedure techniques focus on tissue conservation and soft tissue rearrangement, rather than tissue removal.  Often the uvula is preserved in this procedure. Residual sleep apnea is common in patient after pharyngoplasty with an average reduction in sleep apnea events of 33%2.      Tongue Procedures:  While patients are awake, the muscles that surround the throat are active and keep this region open for breathing. These muscles relax during sleep, allowing the tongue and other structures to collapse and block breathing.  There are several different tongue procedures available.  Selection of a tongue base procedure depends on characteristics seen on physical exam.  Generally, procedures are aimed at removing bulky tissues in this area or preventing the back of the tongue from falling back during sleep.  Success rates for tongue surgery range from 50-62%3.    Hypoglossal Nerve Stimulation:  Hypoglossal nerve stimulation has recently received approval from the [place] Food and Drug Administration for the treatment of obstructive sleep apnea.  This is based on research showing that the system was safe and effective in treating sleep apnea6.  Results showed that the median AHI score decreased 68%, from 29.3 to 9.0. This therapy uses an implant system that senses breathing patterns and delivers mild stimulation to airway muscles, which keeps the airway open during sleep.  The system consists of three fully implanted components: a small generator (similar in size to a pacemaker), a breathing sensor, and a stimulation lead.  Using a small  handheld remote, a patient turns the therapy on before bed and off upon awakening.    Candidates for this device must be greater than 18 years of age, have moderate to severe obstructive sleep apnea with less than 25% central events  (AHI between 15-65), BMI less than 35, have tried CPAP/oral appliance for at least 8 weeks without success, and have appropriate upper airway anatomy (determined by a sleep endoscopy performed by Dr. Ed Hines or Dr. Estrada Bates).    Nasal Procedures:  Nasal obstruction can interfere with nasal breathing during the day and night.  Studies have shown that relief of nasal obstruction can improve the ability of some patients to tolerate positive airway pressure therapy for obstructive sleep apnea1.  Treatment options include medications such as nasal saline, topical corticosteroid and antihistamine sprays, and oral medications such as antihistamines or decongestants. Non-surgical treatments can include external nasal dilators for selected patients. If these are not successful by themselves, surgery can improve the nasal airway either alone or in combination with these other options.        Combination Procedures:  Combination of surgical procedures and other treatments may be recommended, particularly if patients have more than one area of narrowing or persistent positional disease.  The success rate of combination surgery ranges from 66-80%2,3.    References  Km SWIFT. The Role of the Nose in Snoring and Obstructive Sleep Apnoea: An Update.  Eur Arch Otorhinolaryngol. 2011; 268: 1365-73.   Mo SM; Oracio JA; Khadar JR; Pallanch JF; Vasile MB; Garth SG; Muarizio GRIMMD. Surgical modifications of the upper airway for obstructive sleep apnea in adults: a systematic review and meta-analysis. SLEEP 2010;33(10):8843-3650. Ira ALEXIS. Hypopharyngeal surgery in obstructive sleep apnea: an evidence-based medicine review.  Arch Otolaryngol Head Neck Surg. 2006 Feb;132(2):206-13.  Milton GARCIA,  Manjeet Y, Christopher CRISTOPHER. The efficacy of anatomically based multilevel surgery for obstructive sleep apnea. Otolaryngol Head Neck Surg. 2003 Oct;129(4):327-35.  Ira ALEXIS, Goldberg A. Hypopharyngeal Surgery in Obstructive Sleep Apnea: An Evidence-Based Medicine Review. Arch Otolaryngol Head Neck Surg. 2006 Feb;132(2):206-13.  Rich MAURICIO et al. Upper-Airway Stimulation for Obstructive Sleep Apnea.  N Engl J Med. 2014 Jan 9;370(2):139-49.  Dilcia Y et al. Increased Incidence of Cardiovascular Disease in Middle-aged Men with Obstructive Sleep Apnea. Am J Respir Crit Care Med; 2002 166: 159-165  Espinal EM et al. Studying Life Effects and Effectiveness of Palatopharyngoplasty (SLEEP) study: Subjective Outcomes of Isolated Uvulopalatopharyngoplasty. Otolaryngol Head Neck Surg. 2011; 144: 623-631.  WHAT IF I ONLY HAVE SNORING?  Mandibular advancement devices, lateral sleep positioning, long-term weight loss and treatment of nasal allergies have been shown to improve snoring.  Exercising tongue muscles with a game (https://Wheretoget.A Fourth Act/us/refugio/soundFuturis.tk-reduce-snoring/zr7910998647) or stimulating the tongue during the day with a device (https://doi.org/10.3390/xhk88318237) have improved snoring in some individuals.  https://www.[place].Newzstand/  https://www.sleepfoundation.org/best-anti-snoring-mouthpieces-and-mouthguards    Remember to Drive Safe... Drive Alive     Sleep health profoundly affects your health, mood, and your safety.  Thirty three percent of the population (one in three of us) is not getting enough sleep and many have a sleep disorder. Not getting enough sleep or having an untreated / undertreated sleep condition may make us sleepy without even knowing it. In fact, our driving could be dramatically impaired due to our sleep health. As your provider, here are some things I would like you to know about driving:     Here are some warning signs for impairment and dangerous drowsy driving:              -Having been  awake more than 16 hours               -Looking tired               -Eyelid drooping              -Head nodding (it could be too late at this point)              -Driving for more than 30 minutes     Some things you could do to make the driving safer if you are experiencing some drowsiness:              -Stop driving and rest              -Call for transportation              -Make sure your sleep disorder is adequately treated     Some things that have been shown NOT to work when experiencing drowsiness while driving:              -Turning on the radio              -Opening windows              -Eating any  distracting  /  entertaining  foods (e.g., sunflower seeds, candy, or any other)              -Talking on the phone      Your decision may not only impact your life, but also the life of others. Please, remember to drive safe for yourself and all of us.

## 2024-02-23 NOTE — NURSING NOTE
Scheduled PSG for 2/23/2024 at [facility] with follow up on 5/23/2024 with Dr. Ryder. Scheduled CBTI on 10/10/2024. Printed AVS. Sent sleep study message and showed pt sleep lab.    [facility]   Clinic Assistant  [facility]

## 2024-02-24 NOTE — PATIENT INSTRUCTIONS
[facility]    1. Your sleep study will be reviewed by a sleep physician within the next few days.     2. Please follow up in the sleep clinic as scheduled, or, make an appointment with your sleep provider to be seen within two weeks to discuss the results of the sleep study.    3. If you have any questions or problems with your treatment plan, please contact your sleep clinic provider at [phone] to further manage your condition.    4. Please review your attached medication list, and, at your follow-up appointment advise your sleep clinic provider about any changes.    5. Go to [email or web address] for more information about your sleep problems.    SRINIVAS TITUS, RPSGT  February 24, 2024

## 2024-02-29 NOTE — PROCEDURES
"       SLEEP STUDY INTERPRETATION  DIAGNOSTIC POLYSOMNOGRAPHY REPORT      Patient: TEJAS YATES  YOB: 1995  Study Date: 2/23/2024  MRN: [id number]  Referring Provider: Maura Sotelo MD  Ordering Provider: Kae Huang APRN CNP    Indications for Polysomnography: The patient is a 28 year old Female who is 5' 2\" and weighs 197.0 lbs. Her BMI is 36.3, Eagle sleepiness scale 3 and neck circumference is 39 cm. Relevant medical history includes polycystic ovarian syndrome, depression. A diagnostic polysomnogram was performed to evaluate for sleep apnea.    Polysomnogram Data: A full night polysomnogram recorded the standard physiologic parameters including EEG, EOG, EMG, ECG, nasal and oral airflow. Respiratory parameters of chest and abdominal movements were recorded with respiratory inductance plethysmography. Oxygen saturation was recorded by pulse oximetry. Hypopnea scoring rule used: 1B 4%.    Sleep Architecture: Normal sleep efficiency and arousal index.  The total recording time of the polysomnogram was 432.4 minutes. The total sleep time was 397.5 minutes. Sleep latency was normal at 6.5 minutes with the use of a sleep aid (zolpidem 5 mg). REM latency was 84.5 minutes. Arousal index was normal at 8.8 arousals per hour. Sleep efficiency was normal at 91.9%. Wake after sleep onset was 28.0 minutes. The patient spent 1.9% of total sleep time in Stage N1, 44.7% in Stage N2, 30.2% in Stage N3, and 23.3% in REM. Time in REM supine was 25.0 minutes.    Respiration: Overall mild REM predominant obstructive sleep apnea.  Events ? The polysomnogram revealed a presence of 3 obstructive, - central, and - mixed apneas resulting in an apnea index of 0.5 events per hour. There were 39 obstructive hypopneas and - central hypopneas resulting in an obstructive hypopnea index of 5.9 and central hypopnea index of - events per hour. The combined apnea/hypopnea index was 6.3 events per hour (central " apnea/hypopnea index was - events per hour). The REM AHI was 24.6 events per hour. The supine AHI was 4.5 events per hour. The RERA index was 1.5 events per hour.  The RDI was 7.8 events per hour.  Snoring - was reported as moderate/intermittent.  Respiratory rate and pattern - was notable for normal respiratory rate and pattern.  Sustained Sleep Associated Hypoventilation - Transcutaneous carbon dioxide monitoring was not used, however significant hypoventilation was not suggested by oximetry.  Sleep Associated Hypoxemia - (Greater than 5 minutes O2 sat at or below 88%) was not present. Baseline oxygen saturation was 96.5%. Lowest oxygen saturation was 86.0%. Time spent less than or equal to 88% was 0.3 minutes. Time spent less than or equal to 89% was 0.7 minutes.    Movement Activity: No abnormal movement activity.  Periodic Limb Activity - There were 29 PLMs during the entire study. The PLM index was 4.4 movements per hour. The PLM Arousal Index was - per hour.  REM EMG Activity - Excessive transient/sustained muscle activity was not present.  Nocturnal Behavior - Abnormal sleep related behaviors were not noted.  Bruxism - None apparent.    Cardiac Summary: Normal sinus rhythm an rates.  The average pulse rate was 77.5 bpm. The minimum pulse rate was 52.0 bpm while the maximum pulse rate was 104.0 bpm.  Arrhythmias were not noted.      Assessment:   Overall mild REM predominant obstructive sleep apnea with AHI 6.3 events per hour. (REM AHI 4.6)  Normal sleep efficiency and arousal index.  No abnormal movement activity.  Normal sinus rhythm an rates.    Recommendations:  Based on the presence of mild obstructive sleep apnea and excessive daytime sleepiness, treatment could be empirically initiated with Auto?titrating PAP therapy with a range of 5 to 15 cmH2O. Recommend clinical follow up with sleep management team.  Patient may be a candidate for dental appliance through referral to Sleep Dentistry for the  treatment of obstructive sleep apnea.  Advice regarding the risks of drowsy driving.  Suggest optimizing sleep schedule and avoiding sleep deprivation.  Weight management (BMI > 30).  Pharmacologic therapy should be used for management of restless legs syndrome only if present and clinically indicated and not based on the presence of periodic limb movements alone.    Diagnostic Codes:   Obstructive Sleep Apnea G47.33        _____________________________________   Electronically Signed By: Jaida Ryder MD 2/29/2024

## 2024-03-04 DIAGNOSIS — T50.905A DRUG-INDUCED NAUSEA AND VOMITING: Primary | ICD-10-CM

## 2024-03-04 DIAGNOSIS — R11.2 DRUG-INDUCED NAUSEA AND VOMITING: Primary | ICD-10-CM

## 2024-03-04 LAB — SLPCOMP: NORMAL

## 2024-03-04 RX ORDER — ONDANSETRON 4 MG/1
4 TABLET, ORALLY DISINTEGRATING ORAL EVERY 8 HOURS PRN
Qty: 12 TABLET | Refills: 1 | Status: SHIPPED | OUTPATIENT
Start: 2024-03-04 | End: 2024-03-24

## 2024-03-07 NOTE — PROGRESS NOTES
Patient started Wegovy 0.25mg/week on 2/21/24. Once tolerating the 1mg/week dose in a couple months will plan to rx the 1.7 and 2.4mg/week doses  Guanaco Alvarez MD

## 2024-03-20 ENCOUNTER — PATIENT OUTREACH (OUTPATIENT)
Dept: CARE COORDINATION | Facility: CLINIC | Age: 29
End: 2024-03-20
Payer: COMMERCIAL

## 2024-04-03 ENCOUNTER — PATIENT OUTREACH (OUTPATIENT)
Dept: CARE COORDINATION | Facility: CLINIC | Age: 29
End: 2024-04-03

## 2024-04-03 ENCOUNTER — LAB (OUTPATIENT)
Dept: LAB | Facility: CLINIC | Age: 29
End: 2024-04-03
Payer: COMMERCIAL

## 2024-04-03 DIAGNOSIS — E28.2 POLYCYSTIC OVARY SYNDROME: ICD-10-CM

## 2024-04-03 LAB — HBA1C MFR BLD: 5.5 % (ref 0–5.6)

## 2024-04-03 PROCEDURE — 36415 COLL VENOUS BLD VENIPUNCTURE: CPT

## 2024-04-03 PROCEDURE — 83036 HEMOGLOBIN GLYCOSYLATED A1C: CPT

## 2024-04-03 PROCEDURE — 80048 BASIC METABOLIC PNL TOTAL CA: CPT

## 2024-04-04 LAB
ANION GAP SERPL CALCULATED.3IONS-SCNC: 11 MMOL/L (ref 7–15)
BUN SERPL-MCNC: 9.5 MG/DL (ref 6–20)
CALCIUM SERPL-MCNC: 9.4 MG/DL (ref 8.6–10)
CHLORIDE SERPL-SCNC: 102 MMOL/L (ref 98–107)
CREAT SERPL-MCNC: 0.77 MG/DL (ref 0.51–0.95)
DEPRECATED HCO3 PLAS-SCNC: 26 MMOL/L (ref 22–29)
EGFRCR SERPLBLD CKD-EPI 2021: >90 ML/MIN/1.73M2
GLUCOSE SERPL-MCNC: 92 MG/DL (ref 70–99)
POTASSIUM SERPL-SCNC: 4.2 MMOL/L (ref 3.4–5.3)
SODIUM SERPL-SCNC: 139 MMOL/L (ref 135–145)

## 2024-04-14 ENCOUNTER — MYC MEDICAL ADVICE (OUTPATIENT)
Dept: SURGERY | Facility: CLINIC | Age: 29
End: 2024-04-14
Payer: COMMERCIAL

## 2024-04-14 DIAGNOSIS — E66.9 OBESITY (BMI 30-39.9): ICD-10-CM

## 2024-04-14 DIAGNOSIS — R73.03 PREDIABETES: Primary | ICD-10-CM

## 2024-04-15 ENCOUNTER — TELEPHONE (OUTPATIENT)
Dept: SURGERY | Facility: CLINIC | Age: 29
End: 2024-04-15
Payer: COMMERCIAL

## 2024-04-15 NOTE — TELEPHONE ENCOUNTER
Prior Authorization Retail Medication Request    Medication/Dose: Wegovy 1.7 for 4 weeks and then 2.4 mg if tolerated ongoing  New/renewal/insurance change PA/secondary ins. PA: continuation of medication  Rationale:  Been tolerating the Wegovy and is working to increase dose to maintenance dose eventually.  Weight currently is 183.3 lbs    Insurance   Primary:New KCBX  Insurance ID:  [id number]    Key for 1.7: [id number]  Key for 2.4 mg: [id number]    Pharmacy Information (if different than what is on RX)  Name:  [facility]  Phone:  [phone]  Fax:[phone]

## 2024-04-19 ENCOUNTER — OFFICE VISIT (OUTPATIENT)
Dept: ENDOCRINOLOGY | Facility: CLINIC | Age: 29
End: 2024-04-19
Payer: COMMERCIAL

## 2024-04-19 VITALS
DIASTOLIC BLOOD PRESSURE: 76 MMHG | HEART RATE: 88 BPM | WEIGHT: 184 LBS | BODY MASS INDEX: 33.65 KG/M2 | SYSTOLIC BLOOD PRESSURE: 124 MMHG

## 2024-04-19 DIAGNOSIS — L68.0 HIRSUTISM: ICD-10-CM

## 2024-04-19 DIAGNOSIS — E28.2 POLYCYSTIC OVARY SYNDROME: Primary | ICD-10-CM

## 2024-04-19 DIAGNOSIS — N91.2 AMENORRHEA: ICD-10-CM

## 2024-04-19 PROCEDURE — 99214 OFFICE O/P EST MOD 30 MIN: CPT | Performed by: INTERNAL MEDICINE

## 2024-04-19 RX ORDER — EFLORNITHINE HYDROCHLORIDE 139 MG/G
CREAM TOPICAL
Qty: 45 G | Refills: 3 | Status: SHIPPED | OUTPATIENT
Start: 2024-04-19

## 2024-04-19 NOTE — PATIENT INSTRUCTIONS
-Start Vaniqa cream: can apply thin layer to affected areas of face and areas under the chin twice daily, at least 8 hours apart.   -Continue follow-up in weight management clinic  -Look into obtaining dental appliance for sleep apnea  -Referral to dermatology for additional input on management of facial hair growth  -Use Provera at least every 3 months to induce menstrual cycle  -Return for a follow-up visit in 1 year, with fasting labs before visit--contact me sooner if pregnancy is ever suspected  -We will communicate results via ReelBigt, or if needed by phone

## 2024-04-19 NOTE — LETTER
2024         RE: Sakshi White  [id number] [facility] [id number]        Dear Colleague,    Thank you for referring your patient, Sakshi White, to the [facility]. Please see a copy of my visit note below.      ENDOCRINOLOGY FOLLOW-UP        HISTORY OF PRESENT ILLNESS    Sakshi White is seen in follow-up.  Patient is accompanied by her partner to this visit.    She is no longer taking metformin.    She was seen in the weight management clinic after I placed referral at our last visit: She initiated Wegovy in 2024.  Has titrated dose up to 1 mg weekly.  Has lost 14 pounds thus far.    Was also seen in sleep medicine in 2024.  Sleep study revealed mild obstructive sleep apnea.  Treatment with CPAP versus dental appliance was discussed.  The patient has been busy with work and has not had a chance to follow-up on treatment options.    Facial hair growth remains a concern: Having to wax facial hair every 2 days.    Last menstrual cycle was in 2023.  Has Provera prescription at home from OB/GYN but she is not certain of the frequency of intervals that she is supposed to use it.    Pertinent endocrine and related history:  1. PCOS. Diagnosis of PCOS made around age 18.  -Menarche was at age 11.  After onset of menarche, the patient recalls amenorrhea.  She was therefore prescribed OCP.  She had regular menstrual cycles while on OCP, although she took inconsistently.  -Hirsutism since around age 16.  Hair growth is primarily on the face and abdomen.  She removes hair by waxing every week.  -.  Has been trying to conceive since at least  and has not been on OCP since then.  Despite this, she has had amenorrhea.  She has been prescribed Provera in gynecology and takes this periodically: Endorses withdrawal bleeding with Provera.  -Was prescribed metformin (along with spironolactone) in the past.  Was not able to tolerate metformin in the past since it caused  worsening of reflux symptoms.  -She recalls laparoscopy in 2015 for unknown condition related to fallopian tubes.  -She was treated with letrozole without successful conception.  2.  Elevated prolactin.  Testing in OB/GYN  (in 8/2022) revealed mildly elevated prolactin: This prompted referral to endocrinology. Recheck prolactin was normal.  3.  Prediabetes.  Family history is notable for father and multiple paternal relatives with diabetes.    4.  Mild obstructive sleep apnea.    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    PAST MEDICAL HISTORY  Past Medical History:   Diagnosis Date     Depression     talk therapy     PCOS (polycystic ovarian syndrome)      Secondary amenorrhea      Vaginitis      Varicella     as child       MEDICATIONS  Current Outpatient Medications   Medication Sig Dispense Refill     diphenhydrAMINE (BENADRYL) 25 MG tablet Take 50 mg by mouth at bedtime       eflornithine HCl (VANIQA) 13.9 % CREA Apply thin layer to affected areas of face and areas under the chin twice daily, at least 8 hours apart. 45 g 3     famotidine (PEPCID) 20 MG tablet TAKE 1 TABLET(20 MG) BY MOUTH DAILY AS NEEDED FOR HEARTBURN 90 tablet 2     omeprazole (PRILOSEC) 20 MG DR capsule Take 1 capsule (20 mg) by mouth daily 90 capsule 3     Semaglutide-Weight Management (WEGOVY) 1 MG/0.5ML pen Inject 1 mg Subcutaneous every 7 days 4 pens 2 mL 0     [START ON 5/9/2024] Semaglutide-Weight Management (WEGOVY) 1.7 MG/0.75ML pen Inject 1.7 mg Subcutaneous once a week (Patient not taking: Reported on 4/19/2024) 3 mL 0     [START ON 6/6/2024] Semaglutide-Weight Management (WEGOVY) 2.4 MG/0.75ML pen Inject 2.4 mg Subcutaneous once a week (Patient not taking: Reported on 4/19/2024) 3 mL 3       Allergies, family, and social history were reviewed and documented as needed in EHR.     REVIEW OF SYSTEMS  A focused ROS was performed, with pertinent positives and negatives as  noted in the HPI.    PHYSICAL EXAM  /76 (BP Location: Right arm, Patient Position: Sitting, Cuff Size: Adult Large)   Pulse 88   Wt 83.5 kg (184 lb)   BMI 33.65 kg/m    Body mass index is 33.65 kg/m .  Constitutional: Vital signs reviewed, as recorded above. Patient is alert, oriented and appears in no acute distress.  Eyes: PER, EOMI, no stare, lid lag, or retraction; no conjunctival injection.  Respiratory: Normal chest wall motion and respiratory effort.  MSK: No clubbing or cyanosis; normal muscle bulk and tone.  Skin: No lesions on visible skin,  Neurological: Alert and oriented times 3. No tremor.        DATA REVIEW  Each of the following laboratory and/or imaging studies were reviewed.      ASSESSMENT  1.  PCOS.  There was a new finding of hyperprolactinemia in 8/2022, with normal prolactin on recheck.  Remainder of work-up for endocrinopathies which could have a similar presentation as PCOS has otherwise been unremarkable: No evidence of cortisol excess (although higher than expected dexamethasone levels on DST with normal cortisol response, 24-hour urine free cortisol levels were normal; thyroid function tests, testosterone, DHEA-sulfate, IGF-I and 17 hydroxyprogesterone has been normal).  Based on this, the patient's presentation is most consistent with PCOS.    A.  Fertility/menstrual cycles.  Was previously following in OB/GYN and was most recently on Clomid while attempting to conceive.  Conception plans have changed recently and pregnancy plans are on hold.  We discussed ensuring she has regular menstrual cycles if there are no plans for pregnancy: She already has a prescription for Provera from OB/GYN and I have asked her to use this at least every 3 months to induce regular menstrual bleeding.  B.  Hirsutism.  Waxing face every 2 days.  Was on spironolactone in the distant past but experienced worsening scalp hair loss.  May also be interested in pregnancy in the midterm.  Would use topical  Vaniqa cream if insurance covers.  Will also place referral to dermatology for input on management of hirsutism.  C.  Metabolic sequelae of PCOS.  -High risk for diabetes based on prior hemoglobin A1c values.  Has established care in the medical weight management clinic and has achieved weight loss with GLP-1 receptor agonist.  Continue follow-up.    2.  Hyperprolactinemia.  Normalized on subsequent checks.  No additional intervention.    3.  High risk for diabetes.  A1c checked prior to this visit is normal.  See discussion above.    4.  Infertility.  Pregnancy plans are on hold.    5.  Mild JOSE.  We discussed getting dental appliance if CPAP is not feasible at present.  Sleep apnea may improve with weight loss, however weight loss efforts may be stalled if there is untreated sleep apnea.    PLAN  -Start Vaniqa cream: can apply thin layer to affected areas of face and areas under the chin twice daily, at least 8 hours apart.   -Continue follow-up in weight management clinic  -Look into obtaining dental appliance for sleep apnea  -Referral to dermatology for additional input on management of facial hair growth  -Use Provera at least every 3 months to induce menstrual cycle  -Return for a follow-up visit in 1 year, with fasting labs before visit--contact me sooner if pregnancy is ever suspected  -We will communicate results via MyChart, or if needed by phone      Orders Placed This Encounter   Procedures     Hemoglobin A1c     TSH with free T4 reflex     Lipid Profile     Comprehensive metabolic panel     Adult Dermatology  Referral       I spent a total of 31 minutes on the date of encounter reviewing medical records, evaluating the patient, coordinating care and documenting in the EHR, as detailed above.      Maura Sotelo MD   Division of Diabetes, Endocrinology and Metabolism  Department of Medicine      Again, thank you for allowing me to participate in the care of your patient.         Sincerely,        Maura Sotelo MD

## 2024-04-19 NOTE — PROGRESS NOTES
ENDOCRINOLOGY FOLLOW-UP        HISTORY OF PRESENT ILLNESS    Sakshi White is seen in follow-up.  Patient is accompanied by her partner to this visit.    She is no longer taking metformin.    She was seen in the weight management clinic after I placed referral at our last visit: She initiated Wegovy in 2024.  Has titrated dose up to 1 mg weekly.  Has lost 14 pounds thus far.    Was also seen in sleep medicine in 2024.  Sleep study revealed mild obstructive sleep apnea.  Treatment with CPAP versus dental appliance was discussed.  The patient has been busy with work and has not had a chance to follow-up on treatment options.    Facial hair growth remains a concern: Having to wax facial hair every 2 days.    Last menstrual cycle was in 2023.  Has Provera prescription at home from OB/GYN but she is not certain of the frequency of intervals that she is supposed to use it.    Pertinent endocrine and related history:  1. PCOS. Diagnosis of PCOS made around age 18.  -Menarche was at age 11.  After onset of menarche, the patient recalls amenorrhea.  She was therefore prescribed OCP.  She had regular menstrual cycles while on OCP, although she took inconsistently.  -Hirsutism since around age 16.  Hair growth is primarily on the face and abdomen.  She removes hair by waxing every week.  -.  Has been trying to conceive since at least  and has not been on OCP since then.  Despite this, she has had amenorrhea.  She has been prescribed Provera in gynecology and takes this periodically: Endorses withdrawal bleeding with Provera.  -Was prescribed metformin (along with spironolactone) in the past.  Was not able to tolerate metformin in the past since it caused worsening of reflux symptoms.  -She recalls laparoscopy in  for unknown condition related to fallopian tubes.  -She was treated with letrozole without successful conception.  2.  Elevated prolactin.  Testing in OB/GYN  (in 2022) revealed mildly  elevated prolactin: This prompted referral to endocrinology. Recheck prolactin was normal.  3.  Prediabetes.  Family history is notable for father and multiple paternal relatives with diabetes.    4.  Mild obstructive sleep apnea.    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    PAST MEDICAL HISTORY  Past Medical History:   Diagnosis Date    Depression     talk therapy    PCOS (polycystic ovarian syndrome)     Secondary amenorrhea     Vaginitis     Varicella     as child       MEDICATIONS  Current Outpatient Medications   Medication Sig Dispense Refill    diphenhydrAMINE (BENADRYL) 25 MG tablet Take 50 mg by mouth at bedtime      eflornithine HCl (VANIQA) 13.9 % CREA Apply thin layer to affected areas of face and areas under the chin twice daily, at least 8 hours apart. 45 g 3    famotidine (PEPCID) 20 MG tablet TAKE 1 TABLET(20 MG) BY MOUTH DAILY AS NEEDED FOR HEARTBURN 90 tablet 2    omeprazole (PRILOSEC) 20 MG DR capsule Take 1 capsule (20 mg) by mouth daily 90 capsule 3    Semaglutide-Weight Management (WEGOVY) 1 MG/0.5ML pen Inject 1 mg Subcutaneous every 7 days 4 pens 2 mL 0    [START ON 5/9/2024] Semaglutide-Weight Management (WEGOVY) 1.7 MG/0.75ML pen Inject 1.7 mg Subcutaneous once a week (Patient not taking: Reported on 4/19/2024) 3 mL 0    [START ON 6/6/2024] Semaglutide-Weight Management (WEGOVY) 2.4 MG/0.75ML pen Inject 2.4 mg Subcutaneous once a week (Patient not taking: Reported on 4/19/2024) 3 mL 3       Allergies, family, and social history were reviewed and documented as needed in EHR.     REVIEW OF SYSTEMS  A focused ROS was performed, with pertinent positives and negatives as noted in the HPI.    PHYSICAL EXAM  /76 (BP Location: Right arm, Patient Position: Sitting, Cuff Size: Adult Large)   Pulse 88   Wt 83.5 kg (184 lb)   BMI 33.65 kg/m    Body mass index is 33.65 kg/m .  Constitutional: Vital signs reviewed, as recorded above.  Patient is alert, oriented and appears in no acute distress.  Eyes: PER, EOMI, no stare, lid lag, or retraction; no conjunctival injection.  Respiratory: Normal chest wall motion and respiratory effort.  MSK: No clubbing or cyanosis; normal muscle bulk and tone.  Skin: No lesions on visible skin,  Neurological: Alert and oriented times 3. No tremor.        DATA REVIEW  Each of the following laboratory and/or imaging studies were reviewed.      ASSESSMENT  1.  PCOS.  There was a new finding of hyperprolactinemia in 8/2022, with normal prolactin on recheck.  Remainder of work-up for endocrinopathies which could have a similar presentation as PCOS has otherwise been unremarkable: No evidence of cortisol excess (although higher than expected dexamethasone levels on DST with normal cortisol response, 24-hour urine free cortisol levels were normal; thyroid function tests, testosterone, DHEA-sulfate, IGF-I and 17 hydroxyprogesterone has been normal).  Based on this, the patient's presentation is most consistent with PCOS.    A.  Fertility/menstrual cycles.  Was previously following in OB/GYN and was most recently on Clomid while attempting to conceive.  Conception plans have changed recently and pregnancy plans are on hold.  We discussed ensuring she has regular menstrual cycles if there are no plans for pregnancy: She already has a prescription for Provera from OB/GYN and I have asked her to use this at least every 3 months to induce regular menstrual bleeding.  B.  Hirsutism.  Waxing face every 2 days.  Was on spironolactone in the distant past but experienced worsening scalp hair loss.  May also be interested in pregnancy in the midterm.  Would use topical Vaniqa cream if insurance covers.  Will also place referral to dermatology for input on management of hirsutism.  C.  Metabolic sequelae of PCOS.  -High risk for diabetes based on prior hemoglobin A1c values.  Has established care in the medical weight management  clinic and has achieved weight loss with GLP-1 receptor agonist.  Continue follow-up.    2.  Hyperprolactinemia.  Normalized on subsequent checks.  No additional intervention.    3.  High risk for diabetes.  A1c checked prior to this visit is normal.  See discussion above.    4.  Infertility.  Pregnancy plans are on hold.    5.  Mild JOSE.  We discussed getting dental appliance if CPAP is not feasible at present.  Sleep apnea may improve with weight loss, however weight loss efforts may be stalled if there is untreated sleep apnea.    PLAN  -Start Vaniqa cream: can apply thin layer to affected areas of face and areas under the chin twice daily, at least 8 hours apart.   -Continue follow-up in weight management clinic  -Look into obtaining dental appliance for sleep apnea  -Referral to dermatology for additional input on management of facial hair growth  -Use Provera at least every 3 months to induce menstrual cycle  -Return for a follow-up visit in 1 year, with fasting labs before visit--contact me sooner if pregnancy is ever suspected  -We will communicate results via MyChart, or if needed by phone      Orders Placed This Encounter   Procedures    Hemoglobin A1c    TSH with free T4 reflex    Lipid Profile    Comprehensive metabolic panel    Adult Dermatology  Referral       I spent a total of 31 minutes on the date of encounter reviewing medical records, evaluating the patient, coordinating care and documenting in the EHR, as detailed above.      Maura Sotelo MD   Division of Diabetes, Endocrinology and Metabolism  Department of Medicine

## 2024-04-26 ENCOUNTER — TELEPHONE (OUTPATIENT)
Dept: SURGERY | Facility: CLINIC | Age: 29
End: 2024-04-26
Payer: COMMERCIAL

## 2024-04-26 NOTE — TELEPHONE ENCOUNTER
Retail Pharmacy Prior Authorization Team   Phone: [phone]    PA Initiation    Medication: Wegovy 1.7 for 4 weeks and then 2.4 mg if tolerated ongoing  Insurance Company: [employer] - Phone [phone] Fax [phone]  Pharmacy Filling the Rx: [id number] - [place] - [address]  Filling Pharmacy Phone: [phone]  Filling Pharmacy Fax: [phone]  Start Date: 4/26/2024

## 2024-04-29 NOTE — TELEPHONE ENCOUNTER
Prior Authorization Approval    Authorization Effective Date: 4/26/2024  Authorization Expiration Date: 11/26/2024  Medication: Wegovy 1.7 - APPROVED  Approved Dose/Quantity:    Reference #:     Insurance Company: [employer] - Phone [phone] Fax [phone]  Expected CoPay:       CoPay Card Available:      Foundation Assistance Needed:    Which Pharmacy is filling the prescription (Not needed for infusion/clinic administered): [id number] - [place] - [address] [facility]  Pharmacy Notified:  YES  Patient Notified:  YES  **Instructed pharmacy to notify patient when script is ready to /ship.**

## 2024-05-01 NOTE — TELEPHONE ENCOUNTER
Retail Pharmacy Prior Authorization Team   Phone: [phone]    PA Initiation    Medication: wegovy  Insurance Company: [employer] - Phone [phone] Fax [phone]  Pharmacy Filling the Rx: [facility][id number] - [place] - [address] [facility] [address] [facility]  Filling Pharmacy Phone: [phone]  Filling Pharmacy Fax: [phone]  Start Date: 5/1/2024

## 2024-05-01 NOTE — TELEPHONE ENCOUNTER
PA not needed at this time.  The PA approval for the 1.7 covers all strengths.  Patient will not need a PA until the previous PA approval expires, which it 11/26/2024.

## 2024-05-29 ENCOUNTER — MYC MEDICAL ADVICE (OUTPATIENT)
Dept: SURGERY | Facility: CLINIC | Age: 29
End: 2024-05-29
Payer: COMMERCIAL

## 2024-05-29 DIAGNOSIS — E66.9 OBESITY (BMI 30-39.9): ICD-10-CM

## 2024-05-29 DIAGNOSIS — R73.03 PREDIABETES: ICD-10-CM

## 2024-06-29 ENCOUNTER — HEALTH MAINTENANCE LETTER (OUTPATIENT)
Age: 29
End: 2024-06-29

## 2024-09-30 ENCOUNTER — PATIENT OUTREACH (OUTPATIENT)
Dept: CARE COORDINATION | Facility: CLINIC | Age: 29
End: 2024-09-30
Payer: COMMERCIAL

## 2024-10-02 DIAGNOSIS — R73.03 PREDIABETES: ICD-10-CM

## 2024-10-02 DIAGNOSIS — E66.9 OBESITY (BMI 30-39.9): ICD-10-CM

## 2024-10-10 ENCOUNTER — VIRTUAL VISIT (OUTPATIENT)
Dept: SURGERY | Facility: CLINIC | Age: 29
End: 2024-10-10
Payer: COMMERCIAL

## 2024-10-10 VITALS — WEIGHT: 165 LBS | HEIGHT: 62 IN | BODY MASS INDEX: 30.36 KG/M2

## 2024-10-10 DIAGNOSIS — E66.09 CLASS 1 OBESITY DUE TO EXCESS CALORIES WITH SERIOUS COMORBIDITY AND BODY MASS INDEX (BMI) OF 30.0 TO 30.9 IN ADULT: Primary | ICD-10-CM

## 2024-10-10 DIAGNOSIS — E66.811 CLASS 1 OBESITY DUE TO EXCESS CALORIES WITH SERIOUS COMORBIDITY AND BODY MASS INDEX (BMI) OF 30.0 TO 30.9 IN ADULT: Primary | ICD-10-CM

## 2024-10-10 DIAGNOSIS — R73.03 PREDIABETES: ICD-10-CM

## 2024-10-10 DIAGNOSIS — E28.2 PCOS (POLYCYSTIC OVARIAN SYNDROME): ICD-10-CM

## 2024-10-10 DIAGNOSIS — R79.89 LOW VITAMIN D LEVEL: ICD-10-CM

## 2024-10-10 PROCEDURE — 99214 OFFICE O/P EST MOD 30 MIN: CPT | Mod: 95 | Performed by: EMERGENCY MEDICINE

## 2024-10-10 ASSESSMENT — PAIN SCALES - GENERAL: PAINLEVEL: NO PAIN (0)

## 2024-10-10 NOTE — PATIENT INSTRUCTIONS
33 lbs down from peak weight of 198 lbs on 12/4/23, a 16.7% total body weight reduction.  Comorbidities:      Plan:   1.  Diet: aiming for 1300-1400kcal/day with 75-80 grams of lean protein daily. I recommend at least one protein supplement daily to help hit that goal if needed. It packs well and can easily be had at work/break time and reduce snacking urges.  Bare Minimum for your active job we need to keep intake above 1100kcal/day and if you can't hit that intake goal, we should consider dose decrease of Wegovy.     2. Exercise: continue active job, find some de-stressing activity this Fall for fun: hikes/bikes/gym/yoga/swimming/adventuring/dancing etc.    3. Medication: Wegovy tolerated. We'll continue as long as you can get bare minimum of 70g/day of lean protein.    4. I recommend one complete multivitamin at least 4 days weekly while on extended Wegovy: Equate Adult Complete works well as do other women one a day type vitamins.   With your low vitamin D last year, start 2000 international unit(s)/day of vitamin D3 and I recommend staying on that dose until May. We'll see how your levels look at our next visit.     5. Goals: down over 16% total body weight thus far and nearly at a weight with a BMI of under 30 where we can say you put your obesity diagnosis into remission. Long term, aiming to stabilize weight in the 135-155 lb range would be an excellent and healthy range.    6. Prediabetes improving with drop in A1c earlier this year. Well done!        LEAN PROTEIN SOURCES  Getting 20-30 grams of protein, 3 meals daily, is appropriate for most people, some need more but more than about 40 grams per meal is not useful.  General rule is drinking one ounce of water per gram of protein eaten over the course of the day:  70 grams of protein each day, drink 70 oz of water.  Protein Source Portion Calories Grams of Protein                           Nonfat, plain Greek yogurt    (10 grams sugar or less) 3/4 cup (6  oz)  12-17   Light Yogurt (10 grams sugar or less) 3/4 cup (6 oz)  6-8   Protein Shake 1 shake 110-180 15-30   Skim/1% Milk or lactose-free milk 1 cup ( 8 oz)  8   Plain or light, flavored soymilk 1 cup  7-8   Plain or light, hemp milk 1 cup 110 6   Fat Free or 1% Cottage Cheese 1/2 cup 90 15   Part skim ricotta cheese 1/2 cup 100 14   Part skim or reduced fat cheese slices 1 ounce 65-80 8     Mozzarella String Cheese 1 80 8   Canned tuna, chicken, crab or salmon  (canned in water)  1/2 cup 100 15-20   White fish (broiled, grilled, baked) 3 ounces 100 21   [place]/Tuna (broiled, grilled, baked) 3 ounces 150-180 21   Shrimp, Scallops, Lobster, Crab 3 ounces 100 21   Pork loin, Pork Tenderloin 3 ounces 150 21   Boneless, skinless chicken /turkey breast                          (broiled, grilled, baked) 3 ounces 120 21   [place], [place], [place], and Venison 3 ounces 120 21   Lean cuts of red meat and pork (sirloin,   round, tenderloin, flank, ground 93%-96%) 3 ounces 170 21   Lean or Extra Lean Ground Turkey 1/2 cup 150 20   90-95% Lean [place] Burger 1 natalya 140-180 21   Low-fat casserole with lean meat 3/4 cup 200 17   Luncheon Meats                                                        (turkey, lean ham, roast beef, chicken) 3 ounces 100 21   Egg (boiled, poached, scrambled) 1 Egg 60 7   Egg Substitute 1/2 cup 70 10   Nuts (limit to 1 serving per day)  3 Tbsp. 150 7   Nut [place] (peanut, almond)  Limit to 1 serving or less daily 1 Tbsp. 90 4   Soy Burger (varies) 1  15   Garbanzo, Black, [place] Beans 1/2 cup 110 7   Refried Beans 1/2 cup 100 7   Kidney and Lima beans 1/2 cup 110 7   Tempeh 3 oz 175 18   Vegan crumbles 1/2 cup 100 14   Tofu 1/2 cup 110 14   Chili (beans and extra lean beef or turkey) 1 cup 200 23   Lentil Stew/Soup 1 cup 150 12   Black Bean Soup 1 cup 175 12           To help lose weight in a safe way and sustainable way, I'd like to start you on a 1300 calorie diet each day.   Understanding that every 3500 calorie deficit adds up to a pound of weight reduction, with the combination of light aerobic exercise, some modest weight training and diet, we should be able to lose around 1 to 2.5lbs weekly depending on your starting height and weight and exercise routine with this goal intake. Dropping abut 1% total body weight weekly is exceptional weight loss and very sustainable once in a good routine.    Hunger and fatigue are the enemies of weight loss and behavior change in general.  We become much more reactive in our eating when we're hungry and tired and decrease our levels of self control.  It's not a personal failing, it's just body chemistry.   We can combat this by trying to avoid being tired and hungry at the same time.  To help this,  try to front load your calories in the first 10 hours of you day so you get into the fatigued evening hours reasonably full and you can control impulses/mindless eating a lot better and avoid those bedtime snacks/evening treats that the tired brain craves.  If you can getting your exercise in the beginning of your day has also been shown to have superior results (but anytime is better than none).  We want to build up to 150 minutes or more as you progress through the first 2-3 months of weight loss season (300 minutes weekly is ideal for maintaining weight loss for most after the end of weight loss season).     Weight loss goes through ups and downs and plateaus but if you stay on the program, you will enjoy success.   Commit to the process, try to be on track at least 19 days out of 20 and continue to think about why you're doing this and what you're working towards. If you haven't thought of your reward for hitting your weight loss goals, think about it now. Using these little victory bribes along the way helps a lot.    Finding a diet that is satisfying and repeatable-- day in and day out, improves success.  The following uses the concept of Daily  Caloric Restriction, eating less every day.  An outline of how to break up the day's food is given below.  It is a starting point and example of what a 1300 calorie diet looks like.  You can modify the listed foods to suite your particular tastes, but pay attention to portions and protein content.   Do not skip breakfast on this plan as it will leave you hungry and lead to overeating at some point during the rest of the day.  If you can make supper the last food for the day more days of the week then not, it will help a lot.  That means that the intake during those first 10-12 hours of the day and hitting your protein/intake targets for all three meals is vital to your success and evening hunger control.     Start reading labels so you know that you're getting what you think you are and start measuring foods so you can eventually look at a portion and understand how it will provide the fuel you want.    Prepping raw veggies after you buy them (washing and putting into bags/tupperware for easy access), cooking several chicken breasts/proteins for the next 2-3 lunches and generally being able to grab and go what will keep you on target when time is short will greatly aid your success.  Prepare and plan ahead and success will follow. It really only requires a couple days weekly to optimize the access to the right food at the right time of day.  Food delivery and stopping at fast food is a sign of reactive eating and usually will signal a stalling of your weight loss.    Read labels:  for protein portions/yogurt, protein bars etc looking for items with more than 10 grams of protein and less than 10 grams of sugar is very helpful.  Frozen meals should have at least 18 grams of protein, under 10 grams of sugar as well (typically around 300-380 calories).    Please note: if you've had previous bariatric surgery: wait 20-30 minutes before/after eating to drink your beverages to avoid early fullness/dumping  syndrome/worsening malabsorption, early loss of fullness and hunger.  Start with eating your protein first, slow down your meals and chew thoroughly.          1300 calorie diet:  Think Big Breakfast, Medium Lunch, smaller dinner.  Note: it's OK to consolidate the calories/protein of the mid morning snack with breakfast and the midafternoon snack with lunch if time doesn't allow or if your don't wish to have those snacks. No snacks recommended after supper. If you're prone to late afternoon nibbling, that is a great time to get your fitness in so you can get to supper without the extra.    Breakfast goal of 300 calories-350 calories (egg, 1/3 to 1/2 cup cooked old fashioned oatmeal or steel cut oats and berries,3-4oz greek yogurt.)Glass of water and if you like coffee (black) or tea.        Mid morning Snack or part of lunch, about 2-2.5 hrs later: 100 calories (cottage cheese/string cheese/ fruit or banana) and a handful of cruchy/green veggies (cucumber/celery/green peppers/broccoli).  Small glass of water    Lunch:  300 calories (3.5-4 oz tuna with little morley (no bread), apple, salad with drizzle of olive oil/balsamic vinegar for example)  Water    Snack:  100 calories.  4-5 oz Greek Yogurt with at least 17 grams of protein per serving.    Or Cottage cheese (lower sodium version preferable). Get this in about 2 hrs before you plan to have dinner. Protein drinks with at least 15-20 grams of protein and less than 6 grams of sugar could be used here to hit protein goals and decrease afternoon/evening hunger.  Glass of water    Dinner:  350 calories (4 oz meat or fish with cooked veggies or salad with minimal dressing, one piece of bread).  Glass of water or unsweetened tea with lemon  Dessert: 100 calories Medium Apple or Small handful of nuts, about 2/3 of an ounce (almonds/walnuts/cashews or pistachios are ideal).   Glass of water.    Try to avoid all soda and juice (low sodium V8 ok once a day).   You can do it!  Embrace the healthier you and give up the inflammatory sugary treats that accelerate disease.    Choose an activity that is fun/interesting and available to you such as  Going for a swim for 15 minutes, walking 40 minutes, elliptical 20 minutes or cycling 20 minutes as many days a week as you can.  Having a walking or workout buddy can make it even more enjoyable and keep you on track the days your motivation/energy may be lower.  If those times are too long for your fitness level, start at 10 minutes of movement and each week try to increase by 2 minutes each week.  The first 70 minutes a week (10 minutes a day only) of exercise drops the mortality rate of a sedentary person 30%!!!!  Our goal is to work up to 150 minutes or more per week of moderate to vigorous exercise  to optimize metabolism and prevent weight regain during maintenance. If time is short and your fitness allows it, high intensity interval training can be a nice way to cut the workout time in half:  warm up 2-4 minutes then 3-6 intervals of increased intensity effort (70% of max heart rate) followed by an equal amount or more of recovery before repeating, then 1-3 minutes of cooldown.     10 minutes of weight lifting can be helpful as well or using some body weight exercises like wall squats, pushups (with assistance as needed or standing/wall pushups), seat presses, yoga moves. At least twice weekly helps maintain a good strength to weight ratio, more days is better.  GoHome is a great resource for free video demonstrations.    If you are into strenuous weight lifting or prolonged exercise, use an online calculator for how many calories you've burned and if your exercise is lasting over 60 minutes, replace 20-30% of those calories burned immediately after exercise .  For the more limited exercise (less than 500 calories burned), there is no need to add extra food unless you notice a lot of hunger on your food journal.  Usually  Even a   "calorie load after longer workouts is more than adequate.  For longer efforts, hunger will increase if you don't refuel afterwards and can get meal plan off track due to hunger, so replenish immediately after the workout to keep on the diet plan and feeling good.    Exercise example:  If you burned 1000 calories during the exercise, immediately (within 30 minutes) have a snack/replacement beverage totaling 200-300 calories and ideally have a 3:1 ratio of carbohydrate grams to protein grams to keep muscles ready for exercise the next day.  Have your next, full meal within 2-3 hours of exercise.    Tips for success:  KEEP A FOOD JOURNAL and a log of daily weights.  Pencil and paper works fine for most. Otherwise, 9+, Blitsy, Posh[employer]ine, [employer], [employer] are all good tracker apps/programs or websites for food/fitness.  Remembering to ask yourself, \"How did my nourishment affect me today\" and comparing \"good days\" to \"hungry days\" to solidify what helps your body, in your life, feel it's best while losing weight.    1.  Prepare proteins ahead of time (broil chicken breasts in bulk so you can grab and go), steel cut oats can be stored in casserole dish/bowl in the fridge for quick scoop in the morning and rewarm in microwave, make use of crock pot recipes (watch salt content).    2.  Drink a 8-12 oz glass of water every 2-3 hours when awake.  We often mistake hunger for thirst, especially when losing weight.    3. Remember your Reward and Motivation when things get hard.    4.  Weigh yourself every morning and record, you'll stay on track better and learn how your body loses weight. Don't worry about 1 or 2 day patterns, but when on track you'll notice good trend downward of weight over 3-4 day segments.    5. Call or use [employer] messaging if problems/concerns .    6.  Find a handful of meals/foods that keep you on track and get into a boring routine that is sustainable for you.    7.  Take a complete multivitamin " "just to make sure all micronutrients are adequate during weight loss.    8. If losing hair/brittle nails it often means you are not taking enough protein.  Minimum goal is 60 grams daily of protein, most people with normal kidneys do well with upwards of 100-120 grams/day of protein. Consider taking Biotin as supplement or a \"Hair and Nail\" multivitamin.  If you are hypothyroid and losing hair, see you doctor for a check up of your levels if you haven't had one recently.    9.  Getting adequate sleep is very important for starting your day properly, when we are sleep deprived, our morning appetite is suppressed and without eating an adequate breakfast, we overeat later in the day when we're tired.  Our body heals in our sleep and our mental and immune health depends on this rest.  Aim for 7-8 hours of sleep nightly if possible.  If you sleep is disturbed, perform some introspection on stressors/depression/anxiety/PTSD events or possible sleep disorders and we can trouble shoot solutions/evaulations if issues persist.    Exercise during the day, meditative breathing before bed and after waking and removing the television from the bedroom are easy ways to improve quality of sleep.      10. Relaxation.  Controlled breathing exercises can lower stress levels in the brain.  One technique is 4, 7, 8 breathing:  Place the tip of your tongue behind your front teeth, breath in through your mouth for 4 seconds, Hold it for 7 seconds and breath out slowly, making a leaky tire noise for 8 seconds.  Repeat 4 times.  Ideally do at the start and end of your day or if feeling stressed.  It works and it's why meditation/yoga/martial arts are often very breath based activities.  There are breathing techniques for alertness as well as relaxation out there and they can be quite helpful.    On-the-Go Breakfast Ideas  As of 2015, the latest research shows what a huge impact eating breakfast has on losing weight and feeling your best. " People lose more weight when they make breakfast their biggest meal of the day compared to Dinner, but even if you cannot go to that degree, getting a breakfast that has at least 20 grams of protein and even a moderate amount of fat is ideal for maintaining good energy through the day and limits overeating in the evening hours.  The following are some quick and easy suggestions for at least getting something of substance into your body in the morning.  Enjoy!    Eating breakfast within 90 minutes of waking up is an important part of taking care of your body on a restricted calorie diet plan.  After sleeping for hours, your body is in need of fuel.  An ideal breakfast is a combination of protein, whole-grain carbohydrates, or fruit.  Here s why:    -Protein digests very slowly in the body, helping you feel more satisfied.  -Whole grains provide dietary fiber, which also digests slowly and helps keep your gut clean.  -Fruit is a great source of vitamins, minerals, and fiber.     Each one of these breakfast combinations has between 200-300 calories and 15-20 grams of protein.  Feel free to mix and match!    Bone Broth (chicken bone broth or beef bone broth) is a great way to boost protein content. 8oz of bone broth will typically have 9-12grams of protein for 40kcal of energy.    Protein: Choose  -1/2 cup low-fat cottage cheese  -2 hard boiled eggs , or one cooked in olive oil (low/slow heat).  -1 low fat string cheese stick  -1 Tablespoon natural peanut butter  -Mieple vegetarian sausage natalya (found in freezer section)  -1 slice lowfat cheese  -6 oz 2% or lowfat Greek yogurt, such as Fage or Oikos.    PLUS    Whole Grains:  Choose   -1 whole wheat English muffin  -1 whole wheat juan, half  -1/2 Fiber One frozen muffin, thawed  -1/2 Fiber One toaster pastry  -1 whole wheat bagel thin  -1/2 cup Kashi cereal  -1 Kashi waffle (or other whole grain high-fiber waffle)  Aim for whole grain/sprouted breads with at  least 3g of fiber/slice if having bread. [place] is one such brand.    OR    Fruit: Choose  -1/3 cup blueberries  -1/2 banana (or a plantain- similar to a banana, yet smaller)  -1/2 cup cantaloupe cubes  -1 small apple  -1 small orange  -1/2 cup strawberries  -handful raspberries/blackberries (each berry is about 1 calorie).    *Adapted from [employer], Fall 20    Ten Breakfasts Under 250 calories    Ideally, getting between 350-600 calories  (depending on starting height and weight)for breakfast is ideal for avoiding hunger later in the day, adjust/add to the following accordingly:    One- 250 calories, 8.5 g protein  1 slice whole-grain toast   1 Tbsp peanut butter    banana    Two- 250 calories, 8 g protein    cup nonfat/lowfat yogurt  1/3rd cup diced no-sugar peaches  1/3rd cup cereal (like Special K, Cheerios, or bran flakes)    Three- 250 calories, 25 g protein  1 egg scrambled with 1 oz skim milk    cup shredded cheddar    whole grain English muffin  1 oz [place] arguello  1 tsp margarine spread    Four- 225 calories, 25 g protein  1/2 cup Kashi Go-Lean cereal    cup skim milk mixed with 1 scoop Bariatric Advantage protein powder    cup no-sugar diced pears    Five- 250 calories, 20 g protein    cup oatmeal prepared with skim milk, 1 scoop protein powder, and sugar-free maple syrup    Six- 200 calories, 5 g protein  1 whole grain waffle, toasted  1 tablespoon creamy peanut or almond butter    Seven-  250 calories, 19 g protein  Breakfast sandwich: 1 slice whole grain toast, cut in half.  Add 1 scrambled egg and one slice cheddar  cheese.    Eight-  250 calories, 15 g protein  2 eggs scrambled with 1/3 cup frozen spinach (heat before adding to eggs) and 2 tablespoons low fat cream cheese.    Nine-  150 calories, 15 g protein  2/3rd cup cottage cheese    cup cantaloupe    Ten- 200 calories, 20 g protein  Fruit smoothie made with 4 oz. nonfat Greek yogurt,   cup berries, 1 scoop protein powder, and 4 oz  skim milk.    Ten Lunches Under 250 Calories    Aim for lunch to be around 300-400 calories a day when trying to lose weight and get that protein in!    One- 200 calories, 11 g protein  1/3 cup tuna salad made with light morley on 1 slice whole grain bread  1 small peeled apple    Two- 250 calories, 16 g protein  1/3 cup lowfat cottage cheese    cup cooked green beans    small fruit cocktail (in natural juice)    Three- 200 calories, 11 g protein    grilled cheese sandwich on whole grain bread with lowfat cheese  2/3rd cup of tomato soup    Four- 250 calories, 22 g protein  Deli wrap: 1 oz sliced turkey, 1 oz sliced ham, 1 oz sliced chicken rolled up with 1 slice low-fat cheese  1 small orange    Five- 250 calories, 28 g protein  2/3rd cup chili with 1 oz shredded cheese  4 saltine crackers    Six- 250 calories, 22 g protein  1 cup fresh spinach with 2 oz chicken, 1/3rd cup mandarin oranges, and 2 tablespoons sliced almonds with 1 tablespoon  vinaigrette dressing    Seven- 200 calories, 11 g protein  1 Tbsp sugar-free preserves and 1 Tbsp peanut butter on 1 slice whole grain toast    cup nonfat/lowfat Greek yogurt    Eight- 250 calories, 18 g protein  1 small soft-shell chicken taco with 1 oz shredded cheese, lettuce, tomato, salsa, and 1 Tbsp light sour cream    cup black beans    Nine- 225 calories, 13 g protein  2 ounces baked chicken  1/4 cup mashed potatoes    cup green beans    Ten- 200 calories, 21 g protein  Deli juan: 2 oz roast beef or other deli meat with 1 tsp [place] mayonnaise and sliced tomato, onion, and lettuce  1/3rd cup cottage cheese      Ten Dinners Under 300 calories    If you're eating a large breakfast and medium lunch, keep dinner small.  300-400 calories is ideal for most people depending on their caloric needs.    One- 300 calories, 12 g protein  1-inch thick slice of turkey meatloaf    cup baked butternut squash    Two- 200 calories, 9 g protein  Bread-less BLT: 3 slices turkey arguello, sliced  tomato, wrapped in a large lettuce leaf    cup peeled fruit    Three- 275 calories, 36 g protein  3 oz roasted chicken    cup cooked broccoli    cup shredded cheddar cheese    cup unsweetened applesauce    Four- 200 calories, 25 g protein  3 oz baked tilapia  1/3rd cup cooked carrots    cup yogurt    Five- 250 calories, 20 g protein  Grilled ham  n  Swiss: spread 2 tsp ghee or butter on 1 slice of whole grain bread.  Cut bread in half, layer 2 oz deli ham with 1 piece of Swiss cheese and grill until cheese is melted.    cup cooked vegetables    Six- 250 calories, 18 g protein  Vegetarian cheeseburger: 1 [place] cheeseburger topped with lettuce, onion, tomato, and ketchup/mustard    cup sweet potato fries    Seven- 250 calories, 18 g protein  Pork pot roast: 2 oz roasted pork loin, 1/3rd cup roasted carrots,   medium potato, cooked with   cup gravy    Eight- 330 calories, 25 g protein  2 oz meatballs (about 2 small meatballs)    cup spaghetti sauce  1/2 piece toast topped with 1 tsp ghee or butterand topped with garlic powder, toasted in oven    Nine- 250 calories, 16 g protein  Mexican pizza: one 8  corn tortilla topped with 2 oz chicken,   cup salsa, 2 tablespoons black beans, 2 tablespoons shredded cheese.  Bake until cheese is melted.    Ten- 250 calories, 22 g protein  Shrimp stir-washburn: 3 oz cooked shrimp, 1/6th onion,   pepper,   cup chopped carrots sautéed in 1 tablespoon olive oil, topped with 2 tablespoons stir washburn sauce and a pinch of sesame seeds        150 Calories or Less Snack Ideas   1 hardboiled egg with   cup berries  1 small apple with 1 hardboiled egg  10 almonds with   cup berries  2 clementines with 1 light string cheese  1 light string cheese with   sliced apple  1 light string cheese wrapped in 2 slices of turkey  4 100% whole wheat crackers (e.g. Triscuit) with 1 light string cheese    c. cottage cheese with   cup fruit and 1 Tbsp sunflower seeds     cup cottage cheese with   of an avocado     can  tuna fish with 1 cup sliced cucumbers     cup roasted garbanzo beans with paprika and cayenne pepper    baked sweet potato with   cup chili beans or   cup cottage cheese  2 oz. nitrate free turkey slices with 1 cup carrots  1 container (6 oz) of low sugar (less than 10 grams of sugar) greek yogurt   3 Tablespoons of hummus with 1 cup sliced bell peppers   2 Tablespoons of hummus with 15 baby carrots  4 Tablespoons ranch dip made with plain Greek Yogurt and 3 mini cucumbers  1/4 cup nuts (any kind)  1 Tablespoon peanut butter with 1 stalk celery   1 dill pickle wrapped in 1-2 slices of deli ham with 1 tsp of mayonnaise/mustard.    Wegovy (semaglutide) is a very effective satiety boosting appetite suppressant. It needs to be ramped up slowly to be tolerated adequately.  About 1/10 people will not tolerate this medication. Each month, you move up to a higher dose until eventually reaching the 2.4mg/week dose over 5 months, if tolerating the ramping process well. When in plentiful supply, one try at re-ramping is recommended if the initial ramping has too many side effects/isn't tolerated well and if that attempt at  re-ramping isn't tolerated well, it's best to find an alternative.       Wegovy starts at 0.25mg/week for 4 weeks then increases to 0.5mg/week for 4 weeks then 1mg/week for 4 weeks then 1.7mg/week for 4 weeksthen 2.4mg/week usually for 6-9 months if tolerated well.  Injections can be given after cleansing the skin with alcohol prep pad or swab (available OTC).  Warming to room temperature 20-60 minutes prior to injection by placing on a table/counter reduces potential irritation at the time of injection.    Stop Wegovy if severe abdominal pain/vomiting/rash/throat swelling or constant nausea that prevents adequate food/water intake. Stop 2-3 weeks prior to any planned general anesthesia surgeries to reduce risk for something called a post operative ileus. If unexpected illness/injury that requires surgery,  plan to go off [employer] until fully recovered.    Gallstones can occur in about 1% of patients on this medication so update me if increase right upper abdominal pain after eating.     Start meals with protein first, separate beverages from meals by 20 minutes and work hard in between meals to get your 64-75 oz of water daily to reduce risks for severe constipation. Consider a fiber supplement like powdered psyllium husk in 12 oz water each night.    For Prevention and Treatment of Constipation when on Semaglutide     From least aggressive to most aggressive:     Move: Wallking is essential - the more we move, the more our bowels move  Water: Drink water - 64oz-80oz per day suits most people well. About an one ounce of water per gram of protein eaten.  Go when you need to go. Don't wait. The longer you wait, the harder it gets.  Fiber: Fruit, raw veggies, nuts, whole grains, prune each night, flax/zackary seeds added into meals can all be helpful.   Stool Softeners: if constipation is mild and for maintenance  Gentle laxatives: Miralax, senokot, dulcolax , Smooth move tea as needed     More aggressive (and typically won't get to this point)  Milk of Magnesia to empty out. Don't go anywhere far from a toilet for 6 hours.  Mag Citrate (what you drink before a colonoscopy).   Suppositories  Enema      Check out [employer]. for patient resources.      If you have weekends off, I recommend dosing Thursday or Friday evenings for best control over weekends and ability to ride out some transient side effects should they occur.    Some people starve on this medication if not mindful about food intake. I recommend starting meals with the protein part of your meal first, chew thoroughly and separate beverages from meals by about 20 minutes to make sure you get your nourishment in first. Include vegetables/complex carbohydrates and unsaturated fat as part of your balanced diet but group these at the end of the meal, after protein is  mostly gone. Satiety will kick in too early if drinking too much with meals and under nourishment can result.  If under nourished, more muscle mass losses and metabolic slowing will result and work against us in the long run.    It's not a bad idea to take a complete multivitamin most days of the week if using this medication. Low Iron formulas may be less constipating.    Pancreatitis is a very rare but potentially serious side effect. Stop Wegovy if severe mid abdominal pain/burning in nature or if unable to eat/drink due to severe nausea/discomfort.   People with strong history of pancreatitis without clear cause should stay clear of this medication as should those with strong personal or family history for medullary thyroid cancer or Multiple Endocrine Neoplasia (rare).     Kind Regards,  Guanaco Alvarez MD  [facility]    .

## 2024-10-10 NOTE — PROGRESS NOTES
Virtual Visit Details    Type of service:  Video Visit   Video Start Time:  9:30am  Video End Time:9:57 AM    Originating Location (pt. Location): Home    Distant Location (provider location):  On-site  Platform used for Video Visit: Mel

## 2024-10-10 NOTE — PROGRESS NOTES
Bariatric Clinic Follow-Up Visit:    Sakshi White is a 29 year old  female with Body mass index is 30.18 kg/m .  presenting here today for follow-up on non-surgical efforts for weight loss. Original Intake visit occurred on 2/12/24 with a weight of 197 lbs and BMI of 36.1.  Along with diet and behavior changes, she has been using Wegovy to assist her weight loss goals.  See her intake visit notes for details on identified contributors to weight gain in the past. Chart review shows Dietician calculated RMR of 1570kcal/day and protein intake goal of 60-80g/day.    Weight:   Wt Readings from Last 5 Encounters:   10/10/24 74.8 kg (165 lb)   04/19/24 83.5 kg (184 lb)   02/23/24 89.4 kg (197 lb)   02/12/24 89.6 kg (197 lb 8 oz)   12/08/23 89.4 kg (197 lb)    pounds  Vitamin D Deficiency Screening Results:Low D in need of supplementation.  Lab Results   Component Value Date    VITDT 17 (L) 02/12/2024     Lab Results   Component Value Date    A1C 5.5 04/03/2024    A1C 5.9 02/12/2024    A1C 5.7 10/06/2023    A1C 5.8 07/13/2023    A1C 5.7 04/03/2023     Improving prediabetes.  33 lbs down from peak weight of 198 lbs on 12/4/23, a 16.7% total body weight reduction.  Comorbidities:  Patient Active Problem List   Diagnosis    PCOS (polycystic ovarian syndrome)    Hirsutism    Vitamin D deficiency    Obesity (BMI 30-39.9)    Primary anovulatory infertility    Migraine without aura and without status migrainosus, not intractable    Gastroesophageal reflux disease without esophagitis    Severe major depression, single episode (H)    Insomnia, unspecified type    Irregular menses    Bacterial vaginosis    Pre-diabetes    H. pylori infection    Snoring    Elevated prolactin level    Endometrial polyp    Female infertility    Obstruction of fallopian tube    Class 2 severe obesity due to excess calories with serious comorbidity in adult (H)       Current Outpatient Medications:     diphenhydrAMINE (BENADRYL) 25 MG tablet, Take 50 mg  by mouth at bedtime, Disp: , Rfl:     eflornithine HCl (VANIQA) 13.9 % CREA, Apply thin layer to affected areas of face and areas under the chin twice daily, at least 8 hours apart., Disp: 45 g, Rfl: 3    famotidine (PEPCID) 20 MG tablet, TAKE 1 TABLET(20 MG) BY MOUTH DAILY AS NEEDED FOR HEARTBURN, Disp: 90 tablet, Rfl: 2    omeprazole (PRILOSEC) 20 MG DR capsule, Take 1 capsule (20 mg) by mouth daily, Disp: 90 capsule, Rfl: 3    Semaglutide-Weight Management (WEGOVY) 2.4 MG/0.75ML pen, Inject 2.4 mg subcutaneously once a week., Disp: 3 mL, Rfl: 3      Interim: Since our last visit, she has walking a lot at her Job at the [employer]. Less knee pain. Can still crave some chips. Protein intake lower than ideal. Some recent URI illness so out of her routines. Trying to find times to cook to prepare.     Plan:   1.  Diet: aiming for 1300-1400kcal/day with 75-80 grams of lean protein daily. I recommend at least one protein supplement daily to help hit that goal if needed. It packs well and can easily be had at work/break time and reduce snacking urges.  Bare Minimum for your active job we need to keep intake above 1100kcal/day and if you can't hit that intake goal, we should consider dose decrease of Wegovy.     2. Exercise: continue active job, find some de-stressing activity this Fall for fun: hikes/bikes/gym/yoga/swimming/adventuring/dancing etc.    3. Medication: Wegovy tolerated. We'll continue as long as you can get bare minimum of 70g/day of lean protein.    4. I recommend one complete multivitamin at least 4 days weekly while on extended Wegovy: Equate Adult Complete works well as do other women one a day type vitamins.   With your low vitamin D last year, start 2000 international unit(s)/day of vitamin D3 and I recommend staying on that dose until May. We'll see how your levels look at our next visit.     5. Goals: down over 16% total body weight thus far and nearly at a weight with a BMI of under 30 where we can say  "you put your obesity diagnosis into remission. Long term, aiming to stabilize weight in the 135-155 lb range would be an excellent and healthy range.    6. Prediabetes improving with drop in A1c earlier this year. Well done!      We discussed [facility]ariat[facility] Basics including:  -eating 3 meals daily  -reviewed metabolic needs for weight loss based on Resting Metabolic Rate  -protein goals supportive of healthy weight loss  -avoiding/limiting calorie containing beverages  -We discussed the importance of restorative sleep and stress management in maintaining a healthy weight.  -We discussed the National Weight Control Registry healthy weight maintenance strategies and ways to optimize metabolism.  -We discussed the importance of physical activity including cardiovascular and strength training in maintaining a healthier weight and explored viable options.      Most recent labs:  Lab Results   Component Value Date    WBC 4.1 03/28/2019    HGB 12.8 03/22/2022    HCT 41.3 03/28/2019    MCV 78 (L) 03/28/2019     03/28/2019     No results found for: \"CHOL\"  No results found for: \"HDL\"  No components found for: \"LDLCALC\"  No results found for: \"TRIG\"  No results found for: \"CHOLHDL\"  Lab Results   Component Value Date    ALT 9 02/12/2024    AST 25 02/12/2024    ALKPHOS 43 02/12/2024     No results found for: \"HGBA1C\"  Lab Results   Component Value Date    B12 395 02/12/2024     No components found for: \"VITDT1\"  No results found for: \"NABEEL\"  Lab Results   Component Value Date    PTHI 31 02/12/2024     No results found for: \"ZN\"  No results found for: \"VIB1WB\"  Lab Results   Component Value Date    TSH 1.26 02/12/2024     No results found for: \"TEST\"    DIETARY HISTORY  Reviewed intake needs. Struggles for diversity of food.      PHYSICAL ACTIVITY PATTERNS:  Cardiovascular: walks a lot at her [employer] job.  Strength Training: chores/job.    REVIEW OF SYSTEMS  URI sx this week. Otherwise tolerating Wegovy " "well..  PHYSICAL EXAM:  Vitals: Ht 1.575 m (5' 2\")   Wt 74.8 kg (165 lb)   BMI 30.18 kg/m    Weight:   Wt Readings from Last 3 Encounters:   10/10/24 74.8 kg (165 lb)   04/19/24 83.5 kg (184 lb)   02/23/24 89.4 kg (197 lb)         GEN: Pleasant, well groomed, in no acute distress  HEENT:  stuffy nose .  NECK: No swelling.  HEART: .  LUNGS: No respiratory difficulty noted. No cough. .  ABDOMEN: .  EXTREMITIES: No tremor. ..  NEURO: Alert and Oriented X3, fluent speech. Flat affect.  SKIN: No visible rashes. .    Interim study results:   Lab Results   Component Value Date    A1C 5.5 04/03/2024    A1C 5.9 02/12/2024    A1C 5.7 10/06/2023    A1C 5.8 07/13/2023    A1C 5.7 04/03/2023     Vitamin D Deficiency Screening Results:  Lab Results   Component Value Date    VITDT 17 (L) 02/12/2024     .        Guanaco Alvarez MD  [facility]  7:48 AM  10/10/2024  "

## 2024-10-10 NOTE — LETTER
10/10/2024      Sakshi White  [address] [address] [address]      Dear Colleague,    Thank you for referring your patient, Sakshi White, to the [facility] [place]. Please see a copy of my visit note below.    Bariatric Clinic Follow-Up Visit:    Sakshi White is a 29 year old  female with Body mass index is 30.18 kg/m .  presenting here today for follow-up on non-surgical efforts for weight loss. Original Intake visit occurred on 2/12/24 with a weight of 197 lbs and BMI of 36.1.  Along with diet and behavior changes, she has been using Wegovy to assist her weight loss goals.  See her intake visit notes for details on identified contributors to weight gain in the past. Chart review shows Dietician calculated RMR of 1570kcal/day and protein intake goal of 60-80g/day.    Weight:   Wt Readings from Last 5 Encounters:   10/10/24 74.8 kg (165 lb)   04/19/24 83.5 kg (184 lb)   02/23/24 89.4 kg (197 lb)   02/12/24 89.6 kg (197 lb 8 oz)   12/08/23 89.4 kg (197 lb)    pounds  Vitamin D Deficiency Screening Results:Low D in need of supplementation.  Lab Results   Component Value Date    VITDT 17 (L) 02/12/2024     Lab Results   Component Value Date    A1C 5.5 04/03/2024    A1C 5.9 02/12/2024    A1C 5.7 10/06/2023    A1C 5.8 07/13/2023    A1C 5.7 04/03/2023     Improving prediabetes.  33 lbs down from peak weight of 198 lbs on 12/4/23, a 16.7% total body weight reduction.  Comorbidities:  Patient Active Problem List   Diagnosis     PCOS (polycystic ovarian syndrome)     Hirsutism     Vitamin D deficiency     Obesity (BMI 30-39.9)     Primary anovulatory infertility     Migraine without aura and without status migrainosus, not intractable     Gastroesophageal reflux disease without esophagitis     Severe major depression, single episode (H)     Insomnia, unspecified type     Irregular menses     Bacterial vaginosis     Pre-diabetes     H. pylori infection     Snoring      Elevated prolactin level     Endometrial polyp     Female infertility     Obstruction of fallopian tube     Class 2 severe obesity due to excess calories with serious comorbidity in adult (H)       Current Outpatient Medications:      diphenhydrAMINE (BENADRYL) 25 MG tablet, Take 50 mg by mouth at bedtime, Disp: , Rfl:      eflornithine HCl (VANIQA) 13.9 % CREA, Apply thin layer to affected areas of face and areas under the chin twice daily, at least 8 hours apart., Disp: 45 g, Rfl: 3     famotidine (PEPCID) 20 MG tablet, TAKE 1 TABLET(20 MG) BY MOUTH DAILY AS NEEDED FOR HEARTBURN, Disp: 90 tablet, Rfl: 2     omeprazole (PRILOSEC) 20 MG DR capsule, Take 1 capsule (20 mg) by mouth daily, Disp: 90 capsule, Rfl: 3     Semaglutide-Weight Management (WEGOVY) 2.4 MG/0.75ML pen, Inject 2.4 mg subcutaneously once a week., Disp: 3 mL, Rfl: 3      Interim: Since our last visit, she has walking a lot at her Job at the [employer]. Less knee pain. Can still crave some chips. Protein intake lower than ideal. Some recent URI illness so out of her routines. Trying to find times to cook to prepare.     Plan:   1.  Diet: aiming for 1300-1400kcal/day with 75-80 grams of lean protein daily. I recommend at least one protein supplement daily to help hit that goal if needed. It packs well and can easily be had at work/break time and reduce snacking urges.  Bare Minimum for your active job we need to keep intake above 1100kcal/day and if you can't hit that intake goal, we should consider dose decrease of Wegovy.     2. Exercise: continue active job, find some de-stressing activity this Fall for fun: hikes/bikes/gym/yoga/swimming/adventuring/dancing etc.    3. Medication: Wegovy tolerated. We'll continue as long as you can get bare minimum of 70g/day of lean protein.    4. I recommend one complete multivitamin at least 4 days weekly while on extended Wegovy: Equate Adult Complete works well as do other women one a day type vitamins.   With  "your low vitamin D last year, start 2000 international unit(s)/day of vitamin D3 and I recommend staying on that dose until May. We'll see how your levels look at our next visit.     5. Goals: down over 16% total body weight thus far and nearly at a weight with a BMI of under 30 where we can say you put your obesity diagnosis into remission. Long term, aiming to stabilize weight in the 135-155 lb range would be an excellent and healthy range.    6. Prediabetes improving with drop in A1c earlier this year. Well done!      We discussed [facility]ariat[facility] Basics including:  -eating 3 meals daily  -reviewed metabolic needs for weight loss based on Resting Metabolic Rate  -protein goals supportive of healthy weight loss  -avoiding/limiting calorie containing beverages  -We discussed the importance of restorative sleep and stress management in maintaining a healthy weight.  -We discussed the National Weight Control Registry healthy weight maintenance strategies and ways to optimize metabolism.  -We discussed the importance of physical activity including cardiovascular and strength training in maintaining a healthier weight and explored viable options.      Most recent labs:  Lab Results   Component Value Date    WBC 4.1 03/28/2019    HGB 12.8 03/22/2022    HCT 41.3 03/28/2019    MCV 78 (L) 03/28/2019     03/28/2019     No results found for: \"CHOL\"  No results found for: \"HDL\"  No components found for: \"LDLCALC\"  No results found for: \"TRIG\"  No results found for: \"CHOLHDL\"  Lab Results   Component Value Date    ALT 9 02/12/2024    AST 25 02/12/2024    ALKPHOS 43 02/12/2024     No results found for: \"HGBA1C\"  Lab Results   Component Value Date    B12 395 02/12/2024     No components found for: \"VITDT1\"  No results found for: \"NABEEL\"  Lab Results   Component Value Date    PTHI 31 02/12/2024     No results found for: \"ZN\"  No results found for: \"VIB1WB\"  Lab Results   Component Value Date    TSH 1.26 02/12/2024     No " "results found for: \"TEST\"    DIETARY HISTORY  Reviewed intake needs. Struggles for diversity of food.      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  Strength Training: chores/job.    REVIEW OF SYSTEMS  URI sx this week. Otherwise tolerating Wegovy well..  PHYSICAL EXAM:  Vitals: Ht 1.575 m (5' 2\")   Wt 74.8 kg (165 lb)   BMI 30.18 kg/m    Weight:   Wt Readings from Last 3 Encounters:   10/10/24 74.8 kg (165 lb)   04/19/24 83.5 kg (184 lb)   02/23/24 89.4 kg (197 lb)         GEN: Pleasant, well groomed, in no acute distress  HEENT:  stuffy nose .  NECK: No swelling.  HEART: .  LUNGS: No respiratory difficulty noted. No cough. .  ABDOMEN: .  EXTREMITIES: No tremor. ..  NEURO: Alert and Oriented X3, fluent speech. Flat affect.  SKIN: No visible rashes. .    Interim study results:   Lab Results   Component Value Date    A1C 5.5 04/03/2024    A1C 5.9 02/12/2024    A1C 5.7 10/06/2023    A1C 5.8 07/13/2023    A1C 5.7 04/03/2023     Vitamin D Deficiency Screening Results:  Lab Results   Component Value Date    VITDT 17 (L) 02/12/2024     .         Guanaco Alvarez MD  [facility]  7:48 AM  10/10/2024    Virtual Visit Details    Type of service:  Video Visit   Video Start Time:  9:30am  Video End Time:9:57 AM    Originating Location (pt. Location): Home    Distant Location (provider location):  On-site  Platform used for Video Visit: Mel      Again, thank you for allowing me to participate in the care of your patient.        Sincerely,        Guanaco Alvarez MD  "

## 2024-10-10 NOTE — NURSING NOTE
Current patient location: [id number] [place] [id number]    Is the patient currently in the state of [place]? YES    Visit mode:VIDEO    If the visit is dropped, the patient can be reconnected by: VIDEO VISIT: Text to cell phone:   Telephone Information:   Mobile [phone]       Will anyone else be joining the visit? NO  (If patient encounters technical issues they should call [phone])    Are changes needed to the allergy or medication list? No    Are refills needed on medications prescribed by this physician? NO    Rooming Documentation:  Questionnaire(s) completed    Reason for visit: RECHECK    Dejon ALFARO

## 2024-10-17 ENCOUNTER — OFFICE VISIT (OUTPATIENT)
Dept: URGENT CARE | Facility: URGENT CARE | Age: 29
End: 2024-10-17
Payer: COMMERCIAL

## 2024-10-17 VITALS
WEIGHT: 166.8 LBS | HEART RATE: 76 BPM | SYSTOLIC BLOOD PRESSURE: 121 MMHG | TEMPERATURE: 98.7 F | RESPIRATION RATE: 16 BRPM | DIASTOLIC BLOOD PRESSURE: 77 MMHG | BODY MASS INDEX: 30.51 KG/M2 | OXYGEN SATURATION: 100 %

## 2024-10-17 DIAGNOSIS — R30.0 DYSURIA: ICD-10-CM

## 2024-10-17 DIAGNOSIS — M54.31 BILATERAL SCIATICA: Primary | ICD-10-CM

## 2024-10-17 DIAGNOSIS — M54.32 BILATERAL SCIATICA: Primary | ICD-10-CM

## 2024-10-17 DIAGNOSIS — Z87.39 HISTORY OF RHABDOMYOLYSIS: ICD-10-CM

## 2024-10-17 LAB
ALBUMIN UR-MCNC: NEGATIVE MG/DL
APPEARANCE UR: CLEAR
BASOPHILS # BLD AUTO: 0.1 10E3/UL (ref 0–0.2)
BASOPHILS NFR BLD AUTO: 1 %
BILIRUB UR QL STRIP: NEGATIVE
COLOR UR AUTO: YELLOW
EOSINOPHIL # BLD AUTO: 0.2 10E3/UL (ref 0–0.7)
EOSINOPHIL NFR BLD AUTO: 2 %
ERYTHROCYTE [DISTWIDTH] IN BLOOD BY AUTOMATED COUNT: 14.5 % (ref 10–15)
GLUCOSE UR STRIP-MCNC: NEGATIVE MG/DL
HCT VFR BLD AUTO: 37.4 % (ref 35–47)
HGB BLD-MCNC: 12.2 G/DL (ref 11.7–15.7)
HGB UR QL STRIP: NEGATIVE
IMM GRANULOCYTES # BLD: 0 10E3/UL
IMM GRANULOCYTES NFR BLD: 0 %
KETONES UR STRIP-MCNC: 15 MG/DL
LEUKOCYTE ESTERASE UR QL STRIP: NEGATIVE
LYMPHOCYTES # BLD AUTO: 2.8 10E3/UL (ref 0.8–5.3)
LYMPHOCYTES NFR BLD AUTO: 40 %
MCH RBC QN AUTO: 25.1 PG (ref 26.5–33)
MCHC RBC AUTO-ENTMCNC: 32.6 G/DL (ref 31.5–36.5)
MCV RBC AUTO: 77 FL (ref 78–100)
MONOCYTES # BLD AUTO: 0.4 10E3/UL (ref 0–1.3)
MONOCYTES NFR BLD AUTO: 6 %
NEUTROPHILS # BLD AUTO: 3.5 10E3/UL (ref 1.6–8.3)
NEUTROPHILS NFR BLD AUTO: 51 %
NITRATE UR QL: NEGATIVE
PH UR STRIP: 6.5 [PH] (ref 5–7)
PLATELET # BLD AUTO: 338 10E3/UL (ref 150–450)
RBC # BLD AUTO: 4.87 10E6/UL (ref 3.8–5.2)
SP GR UR STRIP: 1.02 (ref 1–1.03)
UROBILINOGEN UR STRIP-ACNC: 0.2 E.U./DL
WBC # BLD AUTO: 7 10E3/UL (ref 4–11)

## 2024-10-17 PROCEDURE — 85025 COMPLETE CBC W/AUTO DIFF WBC: CPT | Performed by: STUDENT IN AN ORGANIZED HEALTH CARE EDUCATION/TRAINING PROGRAM

## 2024-10-17 PROCEDURE — 82550 ASSAY OF CK (CPK): CPT | Performed by: STUDENT IN AN ORGANIZED HEALTH CARE EDUCATION/TRAINING PROGRAM

## 2024-10-17 PROCEDURE — 99214 OFFICE O/P EST MOD 30 MIN: CPT | Performed by: STUDENT IN AN ORGANIZED HEALTH CARE EDUCATION/TRAINING PROGRAM

## 2024-10-17 PROCEDURE — 80053 COMPREHEN METABOLIC PANEL: CPT | Performed by: STUDENT IN AN ORGANIZED HEALTH CARE EDUCATION/TRAINING PROGRAM

## 2024-10-17 PROCEDURE — 36415 COLL VENOUS BLD VENIPUNCTURE: CPT | Performed by: STUDENT IN AN ORGANIZED HEALTH CARE EDUCATION/TRAINING PROGRAM

## 2024-10-17 PROCEDURE — 81003 URINALYSIS AUTO W/O SCOPE: CPT | Performed by: STUDENT IN AN ORGANIZED HEALTH CARE EDUCATION/TRAINING PROGRAM

## 2024-10-17 RX ORDER — IBUPROFEN 800 MG/1
800 TABLET, FILM COATED ORAL EVERY 8 HOURS PRN
Qty: 45 TABLET | Refills: 0 | Status: SHIPPED | OUTPATIENT
Start: 2024-10-17

## 2024-10-17 RX ORDER — METHYLPREDNISOLONE 4 MG/1
TABLET ORAL
Qty: 21 TABLET | Refills: 0 | Status: SHIPPED | OUTPATIENT
Start: 2024-10-17

## 2024-10-17 NOTE — PROGRESS NOTES
Assessment & Plan     Bilateral sciatica  Carolina has symptoms of bilateral lumbar strain with sciatica along with menstrual cycle which is the first natural menstrual cycle (non-medication-induced) cycle since she was 11 years old and she is having a lot of cramping, passing large clots and generalized lower abdominal achiness. UA is normal. CBC is normal. She has history of rhabo so we will check CK and CMP to further evaluate for this given the muscular discomfort is from the lower abdomen, low back into both legs and would want to promptly treat if she is experiencing rhabdo.  The ibuprofen has not been helping much at all.  She took a 1200 mg dose this morning in hopes that it would cut through the pain but it did not help much.  Given the radiation of the pain into the sciatic nerve bilaterally advised treatment with Medrol Dosepak.  She would like to try ibuprofen 800 mg scheduled every 8 hours first and if that is not working she will stop that and switch to the Medrol Dosepak.  Follow-up if symptoms are not improving or any changes in condition.  - CK total  - methylPREDNISolone (MEDROL DOSEPAK) 4 MG tablet therapy pack  Dispense: 21 tablet; Refill: 0  - ibuprofen (ADVIL/MOTRIN) 800 MG tablet  Dispense: 45 tablet; Refill: 0  - CBC with platelets and differential  - Comprehensive metabolic panel (BMP + Alb, Alk Phos, ALT, AST, Total. Bili, TP)  - CK total  - CBC with platelets and differential  - Comprehensive metabolic panel (BMP + Alb, Alk Phos, ALT, AST, Total. Bili, TP)    History of rhabdomyolysis  - CK total  - CBC with platelets and differential  - Comprehensive metabolic panel (BMP + Alb, Alk Phos, ALT, AST, Total. Bili, TP)  - CK total  - CBC with platelets and differential  - Comprehensive metabolic panel (BMP + Alb, Alk Phos, ALT, AST, Total. Bili, TP)    Dysuria  - UA Macroscopic with reflex to Microscopic and Culture - Lab Collect  - UA Macroscopic with reflex to Microscopic and Culture - Lab  Collect  - CBC with platelets and differential  - Comprehensive metabolic panel (BMP + Alb, Alk Phos, ALT, AST, Total. Bili, TP)  - CBC with platelets and differential  - Comprehensive metabolic panel (BMP + Alb, Alk Phos, ALT, AST, Total. Bili, TP)     30 minutes spent by me on the date of the encounter doing chart review, review of test results, interpretation of tests, patient visit, and documentation       No follow-ups on file.    NATALYA Arroyo [facility]    Nav Figueroa is a 29 year old female who presents to clinic today for the following health issues:  Chief Complaint   Patient presents with    Back Pain     Lower back pain- radiating into legs, pelvic pain, fatigue   Pressure to urinate, no pain       HPI      Review of Systems  Constitutional, HEENT, cardiovascular, pulmonary, GI, , musculoskeletal, neuro, skin, endocrine and psych systems are negative, except as otherwise noted.      Objective    /77   Pulse 76   Temp 98.7  F (37.1  C) (Oral)   Resp 16   Wt 75.7 kg (166 lb 12.8 oz)   LMP 10/17/2024   SpO2 100%   BMI 30.51 kg/m    Physical Exam   GENERAL: alert and no distress  ABDOMEN: soft, nontender, no hepatosplenomegaly, no masses and bowel sounds normal  MS: tight, hypertrophied bilateral lumbar paraspinal muscles L>R  SKIN: no suspicious lesions or rashes  NEURO: Normal strength and tone, mentation intact and speech normal  BACK: no CVA tenderness  PSYCH: mentation appears normal, affect normal/bright    Results for orders placed or performed in visit on 10/17/24   UA Macroscopic with reflex to Microscopic and Culture - Lab Collect     Status: Abnormal    Specimen: Urine, Clean Catch   Result Value Ref Range    Color Urine Yellow Colorless, Straw, Light Yellow, Yellow    Appearance Urine Clear Clear    Glucose Urine Negative Negative mg/dL    Bilirubin Urine Negative Negative    Ketones Urine 15 (A) Negative mg/dL    Specific Gravity  Urine 1.020 1.003 - 1.035    Blood Urine Negative Negative    pH Urine 6.5 5.0 - 7.0    Protein Albumin Urine Negative Negative mg/dL    Urobilinogen Urine 0.2 0.2, 1.0 E.U./dL    Nitrite Urine Negative Negative    Leukocyte Esterase Urine Negative Negative    Narrative    Microscopic not indicated   CBC with platelets and differential     Status: Abnormal   Result Value Ref Range    WBC Count 7.0 4.0 - 11.0 10e3/uL    RBC Count 4.87 3.80 - 5.20 10e6/uL    Hemoglobin 12.2 11.7 - 15.7 g/dL    Hematocrit 37.4 35.0 - 47.0 %    MCV 77 (L) 78 - 100 fL    MCH 25.1 (L) 26.5 - 33.0 pg    MCHC 32.6 31.5 - 36.5 g/dL    RDW 14.5 10.0 - 15.0 %    Platelet Count 338 150 - 450 10e3/uL    % Neutrophils 51 %    % Lymphocytes 40 %    % Monocytes 6 %    % Eosinophils 2 %    % Basophils 1 %    % Immature Granulocytes 0 %    Absolute Neutrophils 3.5 1.6 - 8.3 10e3/uL    Absolute Lymphocytes 2.8 0.8 - 5.3 10e3/uL    Absolute Monocytes 0.4 0.0 - 1.3 10e3/uL    Absolute Eosinophils 0.2 0.0 - 0.7 10e3/uL    Absolute Basophils 0.1 0.0 - 0.2 10e3/uL    Absolute Immature Granulocytes 0.0 <=0.4 10e3/uL   CBC with platelets and differential     Status: Abnormal    Narrative    The following orders were created for panel order CBC with platelets and differential.  Procedure                               Abnormality         Status                     ---------                               -----------         ------                     CBC with platelets and d...[[id number]]  Abnormal            Final result                 Please view results for these tests on the individual orders.

## 2024-10-17 NOTE — LETTER
October 17, 2024      Sakshi White  [address] [address] [address]        To Whom It May Concern:    Sakshi White  was seen on 10/17.  Please excuse her from work today. She may need to miss work tomorrow depending on how her symptoms are doing. Please excuse the absence from work as medically necessary.        Sincerely,        NATALYA Arroyo CNP

## 2024-10-18 ENCOUNTER — TELEPHONE (OUTPATIENT)
Dept: URGENT CARE | Facility: URGENT CARE | Age: 29
End: 2024-10-18
Payer: COMMERCIAL

## 2024-10-18 LAB
ALBUMIN SERPL BCG-MCNC: 4.2 G/DL (ref 3.5–5.2)
ALP SERPL-CCNC: 40 U/L (ref 40–150)
ALT SERPL W P-5'-P-CCNC: 8 U/L (ref 0–50)
ANION GAP SERPL CALCULATED.3IONS-SCNC: 12 MMOL/L (ref 7–15)
AST SERPL W P-5'-P-CCNC: 17 U/L (ref 0–45)
BILIRUB SERPL-MCNC: 0.3 MG/DL
BUN SERPL-MCNC: 14.5 MG/DL (ref 6–20)
CALCIUM SERPL-MCNC: 9.4 MG/DL (ref 8.8–10.4)
CHLORIDE SERPL-SCNC: 101 MMOL/L (ref 98–107)
CK SERPL-CCNC: 102 U/L (ref 26–192)
CREAT SERPL-MCNC: 0.68 MG/DL (ref 0.51–0.95)
EGFRCR SERPLBLD CKD-EPI 2021: >90 ML/MIN/1.73M2
GLUCOSE SERPL-MCNC: 82 MG/DL (ref 70–99)
HCO3 SERPL-SCNC: 23 MMOL/L (ref 22–29)
POTASSIUM SERPL-SCNC: 4 MMOL/L (ref 3.4–5.3)
PROT SERPL-MCNC: 7.4 G/DL (ref 6.4–8.3)
SODIUM SERPL-SCNC: 136 MMOL/L (ref 135–145)

## 2024-10-18 NOTE — TELEPHONE ENCOUNTER
[facility] lab calling to report that there are abnormalities in the lab work from yesterday.  Lab said it is the CMP but RN sees that the CMP is normal.  Provider reviewed results and sent patient the following message:    Hi Brit,  Your CBC is similar to comparison from 5 years ago. The mild decrease in the MCV and MCH are related to iron stores in your body and these numbers can go a little below normal particularly with your menstrual cycle so I am not concerned about these.  Karen Montgomery, AL[facility]    Routing to provider to comment on the rest of the lab work.    Lorraine Maldonado BSN, RN

## 2024-12-10 SDOH — HEALTH STABILITY: PHYSICAL HEALTH: ON AVERAGE, HOW MANY MINUTES DO YOU ENGAGE IN EXERCISE AT THIS LEVEL?: 30 MIN

## 2024-12-10 SDOH — HEALTH STABILITY: PHYSICAL HEALTH: ON AVERAGE, HOW MANY DAYS PER WEEK DO YOU ENGAGE IN MODERATE TO STRENUOUS EXERCISE (LIKE A BRISK WALK)?: 5 DAYS

## 2024-12-10 ASSESSMENT — SOCIAL DETERMINANTS OF HEALTH (SDOH): HOW OFTEN DO YOU GET TOGETHER WITH FRIENDS OR RELATIVES?: ONCE A WEEK

## 2024-12-17 ENCOUNTER — OFFICE VISIT (OUTPATIENT)
Dept: FAMILY MEDICINE | Facility: CLINIC | Age: 29
End: 2024-12-17
Payer: COMMERCIAL

## 2024-12-17 ENCOUNTER — TELEPHONE (OUTPATIENT)
Dept: SURGERY | Facility: CLINIC | Age: 29
End: 2024-12-17

## 2024-12-17 VITALS
OXYGEN SATURATION: 100 % | HEIGHT: 62 IN | RESPIRATION RATE: 16 BRPM | DIASTOLIC BLOOD PRESSURE: 76 MMHG | WEIGHT: 164 LBS | HEART RATE: 75 BPM | TEMPERATURE: 96.7 F | SYSTOLIC BLOOD PRESSURE: 111 MMHG | BODY MASS INDEX: 30.18 KG/M2

## 2024-12-17 DIAGNOSIS — E66.812 CLASS 2 SEVERE OBESITY DUE TO EXCESS CALORIES WITH SERIOUS COMORBIDITY AND BODY MASS INDEX (BMI) OF 36.0 TO 36.9 IN ADULT (H): ICD-10-CM

## 2024-12-17 DIAGNOSIS — F33.1 MODERATE EPISODE OF RECURRENT MAJOR DEPRESSIVE DISORDER (H): ICD-10-CM

## 2024-12-17 DIAGNOSIS — E66.01 CLASS 2 SEVERE OBESITY DUE TO EXCESS CALORIES WITH SERIOUS COMORBIDITY AND BODY MASS INDEX (BMI) OF 36.0 TO 36.9 IN ADULT (H): ICD-10-CM

## 2024-12-17 DIAGNOSIS — G47.10 HYPERSOMNIA: ICD-10-CM

## 2024-12-17 DIAGNOSIS — E28.2 PCOS (POLYCYSTIC OVARIAN SYNDROME): ICD-10-CM

## 2024-12-17 DIAGNOSIS — Z00.00 ROUTINE GENERAL MEDICAL EXAMINATION AT A HEALTH CARE FACILITY: Primary | ICD-10-CM

## 2024-12-17 PROCEDURE — 99395 PREV VISIT EST AGE 18-39: CPT | Performed by: INTERNAL MEDICINE

## 2024-12-17 PROCEDURE — 99214 OFFICE O/P EST MOD 30 MIN: CPT | Mod: 25 | Performed by: INTERNAL MEDICINE

## 2024-12-17 RX ORDER — CITALOPRAM HYDROBROMIDE 10 MG/1
10 TABLET ORAL DAILY
Qty: 30 TABLET | Refills: 1 | Status: SHIPPED | OUTPATIENT
Start: 2024-12-17

## 2024-12-17 ASSESSMENT — PATIENT HEALTH QUESTIONNAIRE - PHQ9
10. IF YOU CHECKED OFF ANY PROBLEMS, HOW DIFFICULT HAVE THESE PROBLEMS MADE IT FOR YOU TO DO YOUR WORK, TAKE CARE OF THINGS AT HOME, OR GET ALONG WITH OTHER PEOPLE: VERY DIFFICULT
SUM OF ALL RESPONSES TO PHQ QUESTIONS 1-9: 16
SUM OF ALL RESPONSES TO PHQ QUESTIONS 1-9: 16

## 2024-12-17 ASSESSMENT — PAIN SCALES - GENERAL: PAINLEVEL_OUTOF10: NO PAIN (0)

## 2024-12-17 NOTE — TELEPHONE ENCOUNTER
Retail Pharmacy Prior Authorization Team   Phone: [phone]      CM KEY: (Key: [id number])    PA Initiation    Medication: WEGOVY 2.4 MG/0.75ML SC SOAJ  Insurance Company: [employer]Specialty PA's - Phone [phone] Fax [phone]  Pharmacy Filling the Rx: [id number] - [place] - [id number] [place] [address] & [facility]  Filling Pharmacy Phone: [phone]  Filling Pharmacy Fax: [phone]  Start Date: 12/17/2024

## 2024-12-17 NOTE — PROGRESS NOTES
Preventive Care Visit  [facility]  Shaquille Long MD, Internal Medicine  Dec 17, 2024      Assessment & Plan     (Z00.00) Routine general medical examination at a health care facility  (primary encounter diagnosis)  Comment: Routine preventative care.  She is up-to-date on immunizations.  She does not want flu or COVID-vaccine.  She is up-to-date on her Pap smear.  She is too young for colon cancer screening or mammograms.  Plan:      (F33.1) Moderate episode of recurrent major depressive disorder (H)  Comment:  she has a history of depression in the past.  She is having trouble at this point in time.  She is primarily having difficulty sleeping and not enjoying activities.she has an elevated PHQ-9.  She is not having suicidal ideation.  She was on citalopram in the past.  She would like to go to counseling  Plan: Will start citalopram (CELEXA) 10 MG tablet daily.  I talked with her about the risks of suicide with starting SSRI medications.  Will refer her to adult Mental Health.  I advised her to have the suicide line placed on her phone.  Will have her follow-up in 3 to 4 weeks.  Will likely plan on increasing her to 20 mg at that time.             (E28.2) PCOS (polycystic ovarian syndrome)  Comment:   She has a history of polycystic ovary disease.  She is having trouble with her periods.  She is not on anything for polycystic ovary syndrome.  Plan: Ob/Gyn  Referral             (G47.10) Hypersomnia  Comment: She is having trouble with her sleep.  She falls asleep okay but has early awakening.  She has trouble falling back asleep.  She is falling asleep during the day.  She is not been told that she is snoring or stopping breathing while sleeping.  Plan: Adult Sleep Eval & Management          Referral             (E66.812,  E66.01,  Z68.36) Class 2 severe obesity due to excess calories with serious comorbidity and body mass index (BMI) of 36.0 to 36.9 in adult  "(H)  Comment: Noted.  She is followed by the weight management team  Plan:      Patient has been advised of split billing requirements and indicates understanding: Yes        BMI  Estimated body mass index is 30 kg/m  as calculated from the following:    Height as of this encounter: 1.575 m (5' 2\").    Weight as of this encounter: 74.4 kg (164 lb).   Weight management plan: Continue with weight management team    Counseling  Appropriate preventive services were addressed with this patient via screening, questionnaire, or discussion as appropriate for fall prevention, nutrition, physical activity, Tobacco-use cessation, social engagement, weight loss and cognition.  Checklist reviewing preventive services available has been given to the patient.  Reviewed patient's diet, addressing concerns and/or questions.   The patient was instructed to see the dentist every 6 months.   She is at risk for psychosocial distress and has been provided with information to reduce risk.   The patient's PHQ-9 score is consistent with moderate depression. She was provided with information regarding depression.       FUTURE APPOINTMENTS:       - Follow-up visit in 3 to 4 weeks    Nav Figueroa is a 29 year old, presenting for the following:  Physical        12/17/2024     7:07 AM   Additional Questions   Roomed by Rozina BENSON   Accompanied by Self      Answers submitted by the patient for this visit:  Patient Health Questionnaire (Submitted on 12/17/2024)  If you checked off any problems, how difficult have these problems made it for you to do your work, take care of things at home, or get along with other people?: Very difficult  PHQ9 TOTAL SCORE: 16      HPI  29-year-old female presents for a preventative physical.  She has been having trouble with depression.  She is not sleeping well.  She is not enjoying her work.  She is not enjoying her activities at home.  She falls asleep, but she wakes up in the middle of the night.  She has " difficulty falling back asleep.  She has been evaluated for sleep apnea in the past.  She was felt to have mild sleep apnea.  CPAP is an option, but was not felt to be necessary.  She has had problems with depression in the past.  She was in counseling.  She was treated with citalopram many years ago.  She scored 16 on her PHQ-9.  She denies suicidal thoughts.    Health Care Directive  Patient does not have a Health Care Directive:       12/10/2024   General Health   How would you rate your overall physical health? (!) FAIR   Feel stress (tense, anxious, or unable to sleep) Very much      (!) STRESS CONCERN      12/10/2024   Nutrition   Three or more servings of calcium each day? (!) I DON'T KNOW   Diet: Regular (no restrictions)   How many servings of fruit and vegetables per day? (!) 2-3   How many sweetened beverages each day? 0-1            12/10/2024   Exercise   Days per week of moderate/strenous exercise 5 days   Average minutes spent exercising at this level 30 min            12/10/2024   Social Factors   Frequency of gathering with friends or relatives Once a week   Worry food won't last until get money to buy more No   Food not last or not have enough money for food? No   Do you have housing? (Housing is defined as stable permanent housing and does not include staying ouside in a car, in a tent, in an abandoned building, in an overnight shelter, or couch-surfing.) Yes   Are you worried about losing your housing? No   Lack of transportation? No   Unable to get utilities (heat,electricity)? No            12/10/2024   Dental   Dentist two times every year? (!) NO            12/10/2024   TB Screening   Were you born outside of the [place]? No          Today's PHQ-9 Score:       12/17/2024     7:06 AM   PHQ-9 SCORE   PHQ-9 Total Score MyChart 16 (Moderately severe depression)   PHQ-9 Total Score 16        Patient-reported         12/10/2024   Substance Use   Alcohol more than 3/day or more than 7/wk No   Do you use  any other substances recreationally? No        Social History     Tobacco Use    Smoking status: Never     Passive exposure: Never    Smokeless tobacco: Never   Vaping Use    Vaping status: Never Used   Substance Use Topics    Alcohol use: Not Currently     Comment: Occasionally    Drug use: No          Mammogram Screening - Annual screen due to greater than 20% lifetime risk as estimated by Breast Cancer Risk Calculator        12/10/2024   STI Screening   New sexual partner(s) since last STI/HIV test? No        History of abnormal Pap smear: No - age 21-29 PAP every 3 years recommended        4/3/2023     1:27 PM 3/22/2022    12:12 PM 3/28/2019     4:12 PM   PAP / HPV   PAP Negative for Intraepithelial Lesion or Malignancy (NILM)  Negative for Intraepithelial Lesion or Malignancy (NILM)  Negative for squamous intraepithelial lesion or malignancy  Electronically signed by Roxana Bone CT (ASCP) on 4/1/2019 at 11:20 AM              12/10/2024   Contraception/Family Planning   Questions about contraception or family planning No           Reviewed and updated as needed this visit by Provider                    Past Medical History:   Diagnosis Date    Depression     talk therapy    Depressive disorder     Diabetes (H)     Pre diabetic    PCOS (polycystic ovarian syndrome)     Secondary amenorrhea     Vaginitis     Varicella     as child     Past Surgical History:   Procedure Laterality Date    PELVIC LAPAROSCOPY  08/19/2015    WRIST GANGLION EXCISION Right 2019     Current Outpatient Medications   Medication Sig Dispense Refill    citalopram (CELEXA) 10 MG tablet Take 1 tablet (10 mg) by mouth daily. 30 tablet 1    diphenhydrAMINE (BENADRYL) 25 MG tablet Take 50 mg by mouth at bedtime      famotidine (PEPCID) 20 MG tablet TAKE 1 TABLET(20 MG) BY MOUTH DAILY AS NEEDED FOR HEARTBURN 90 tablet 2    ibuprofen (ADVIL/MOTRIN) 800 MG tablet Take 1 tablet (800 mg) by mouth every 8 hours as needed for moderate pain. 45  "tablet 0    omeprazole (PRILOSEC) 20 MG DR capsule Take 1 capsule (20 mg) by mouth daily 90 capsule 3    Semaglutide-Weight Management (WEGOVY) 2.4 MG/0.75ML pen Inject 2.4 mg subcutaneously once a week. 3 mL 3     No Known Allergies      Review of Systems  Constitutional, HEENT, cardiovascular, pulmonary, GI, , musculoskeletal, neuro, skin, endocrine and psych systems are negative, except as otherwise noted.     Objective    Exam  There were no vitals taken for this visit.   Estimated body mass index is 30.51 kg/m  as calculated from the following:    Height as of 10/10/24: 1.575 m (5' 2\").    Weight as of 10/17/24: 75.7 kg (166 lb 12.8 oz).    Physical Exam  GENERAL: alert and no distress  EYES: Eyes grossly normal to inspection, PERRL and conjunctivae and sclerae normal  HENT: ear canals and TM's normal, nose and mouth without ulcers or lesions  NECK: no adenopathy, no asymmetry, masses, or scars  RESP: lungs clear to auscultation - no rales, rhonchi or wheezes  CV: regular rate and rhythm, normal S1 S2, no S3 or S4, no murmur, click or rub, no peripheral edema  ABDOMEN: soft, nontender, no hepatosplenomegaly, no masses and bowel sounds normal  MS: no gross musculoskeletal defects noted, no edema  SKIN: no suspicious lesions or rashes  NEURO: Normal strength and tone, mentation intact and speech normal  PSYCH: mentation appears normal, affect normal/bright        Signed Electronically by: Shaquille Long MD    "

## 2024-12-17 NOTE — TELEPHONE ENCOUNTER
Prior Authorization Retail Medication Request    Medication/Dose: Wegovy 2.4mg/0.75ml INJ--RENEWAL  New/renewal/insurance change PA/secondary ins. PA: renewal  Previously Tried and Failed:  Pt has been taking this medication with good results.   Rationale:  Previous approval  on 2024    Current weight on 2024=164 lb  Weight when started on Wegovy on 2024=197.5 lb    Insurance   Primary: [employer]   Insurance ID:  [id number]     Pharmacy Information (if different than what is on RX)  Name:  [id number] - [place], [place]   Phone:  [phone]   Fax:  [phone]     Clinic Information  Preferred routing pool for dept communication: Bariatric Surgery Support Pool [place]

## 2024-12-17 NOTE — PATIENT INSTRUCTIONS
Patient Education   Preventive Care Advice   This is general advice given by our system to help you stay healthy. However, your care team may have specific advice just for you. Please talk to your care team about your preventive care needs.  Nutrition  Eat 5 or more servings of fruits and vegetables each day.  Try wheat bread, brown rice and whole grain pasta (instead of white bread, rice, and pasta).  Get enough calcium and vitamin D. Check the label on foods and aim for 100% of the RDA (recommended daily allowance).  Lifestyle  Exercise at least 150 minutes each week  (30 minutes a day, 5 days a week).  Do muscle strengthening activities 2 days a week. These help control your weight and prevent disease.  No smoking.  Wear sunscreen to prevent skin cancer.  Have a dental exam and cleaning every 6 months.  Yearly exams  See your health care team every year to talk about:  Any changes in your health.  Any medicines your care team has prescribed.  Preventive care, family planning, and ways to prevent chronic diseases.  Shots (vaccines)   HPV shots (up to age 26), if you've never had them before.  Hepatitis B shots (up to age 59), if you've never had them before.  COVID-19 shot: Get this shot when it's due.  Flu shot: Get a flu shot every year.  Tetanus shot: Get a tetanus shot every 10 years.  Pneumococcal, hepatitis A, and RSV shots: Ask your care team if you need these based on your risk.  Shingles shot (for age 50 and up)  General health tests  Diabetes screening:  Starting at age 35, Get screened for diabetes at least every 3 years.  If you are younger than age 35, ask your care team if you should be screened for diabetes.  Cholesterol test: At age 39, start having a cholesterol test every 5 years, or more often if advised.  Bone density scan (DEXA): At age 50, ask your care team if you should have this scan for osteoporosis (brittle bones).  Hepatitis C: Get tested at least once in your life.  STIs (sexually  transmitted infections)  Before age 24: Ask your care team if you should be screened for STIs.  After age 24: Get screened for STIs if you're at risk. You are at risk for STIs (including HIV) if:  You are sexually active with more than one person.  You don't use condoms every time.  You or a partner was diagnosed with a sexually transmitted infection.  If you are at risk for HIV, ask about PrEP medicine to prevent HIV.  Get tested for HIV at least once in your life, whether you are at risk for HIV or not.  Cancer screening tests  Cervical cancer screening: If you have a cervix, begin getting regular cervical cancer screening tests starting at age 21.  Breast cancer scan (mammogram): If you've ever had breasts, begin having regular mammograms starting at age 40. This is a scan to check for breast cancer.  Colon cancer screening: It is important to start screening for colon cancer at age 45.  Have a colonoscopy test every 10 years (or more often if you're at risk) Or, ask your provider about stool tests like a FIT test every year or Cologuard test every 3 years.  To learn more about your testing options, visit:   .  For help making a decision, visit:   https://bit.ly/sa46107.  Prostate cancer screening test: If you have a prostate, ask your care team if a prostate cancer screening test (PSA) at age 55 is right for you.  Lung cancer screening: If you are a current or former smoker ages 50 to 80, ask your care team if ongoing lung cancer screenings are right for you.  For informational purposes only. Not to replace the advice of your health care provider. Copyright   2023 [facility]. All rights reserved. Clinically reviewed by the [facility]. [employer] 000739 - REV 01/24.  Learning About Stress  What is stress?     Stress is your body's response to a hard situation. Your body can have a physical, emotional, or mental response. Stress is a fact of life for most people, and it  affects everyone differently. What causes stress for you may not be stressful for someone else.  A lot of things can cause stress. You may feel stress when you go on a job interview, take a test, or run a race. This kind of short-term stress is normal and even useful. It can help you if you need to work hard or react quickly. For example, stress can help you finish an important job on time.  Long-term stress is caused by ongoing stressful situations or events. Examples of long-term stress include long-term health problems, ongoing problems at work, or conflicts in your family. Long-term stress can harm your health.  How does stress affect your health?  When you are stressed, your body responds as though you are in danger. It makes hormones that speed up your heart, make you breathe faster, and give you a burst of energy. This is called the fight-or-flight stress response. If the stress is over quickly, your body goes back to normal and no harm is done.  But if stress happens too often or lasts too long, it can have bad effects. Long-term stress can make you more likely to get sick, and it can make symptoms of some diseases worse. If you tense up when you are stressed, you may develop neck, shoulder, or low back pain. Stress is linked to high blood pressure and heart disease.  Stress also harms your emotional health. It can make you hidalgo, tense, or depressed. Your relationships may suffer, and you may not do well at work or school.  What can you do to manage stress?  You can try these things to help manage stress:   Do something active. Exercise or activity can help reduce stress. Walking is a great way to get started. Even everyday activities such as housecleaning or yard work can help.  Try yoga or stevan chi. These techniques combine exercise and meditation. You may need some training at first to learn them.  Do something you enjoy. For example, listen to music or go to a movie. Practice your hobby or do volunteer  "work.  Meditate. This can help you relax, because you are not worrying about what happened before or what may happen in the future.  Do guided imagery. Imagine yourself in any setting that helps you feel calm. You can use online videos, books, or a teacher to guide you.  Do breathing exercises. For example:  From a standing position, bend forward from the waist with your knees slightly bent. Let your arms dangle close to the floor.  Breathe in slowly and deeply as you return to a standing position. Roll up slowly and lift your head last.  Hold your breath for just a few seconds in the standing position.  Breathe out slowly and bend forward from the waist.  Let your feelings out. Talk, laugh, cry, and express anger when you need to. Talking with supportive friends or family, a counselor, or a mohamud leader about your feelings is a healthy way to relieve stress. Avoid discussing your feelings with people who make you feel worse.  Write. It may help to write about things that are bothering you. This helps you find out how much stress you feel and what is causing it. When you know this, you can find better ways to cope.  What can you do to prevent stress?  You might try some of these things to help prevent stress:  Manage your time. This helps you find time to do the things you want and need to do.  Get enough sleep. Your body recovers from the stresses of the day while you are sleeping.  Get support. Your family, friends, and community can make a difference in how you experience stress.  Limit your news feed. Avoid or limit time on social media or news that may make you feel stressed.  Do something active. Exercise or activity can help reduce stress. Walking is a great way to get started.  Where can you learn more?  Go to https://www.NaHere.net/patiented  Enter N032 in the search box to learn more about \"Learning About Stress.\"  Current as of: October 24, 2023  Content Version: 14.2 2024 [employer]. "   Care instructions adapted under license by your healthcare professional. If you have questions about a medical condition or this instruction, always ask your healthcare professional. Healthwise, [facility] disclaims any warranty or liability for your use of this information.    Learning About Depression Screening  What is depression screening?  Depression screening is a way to see if you have depression symptoms. It may be done by a doctor or counselor. It's often part of a routine checkup. That's because your mental health is just as important as your physical health.  Depression is a mental health condition that affects how you feel, think, and act. You may:  Have less energy.  Lose interest in your daily activities.  Feel sad and grouchy for a long time.  Depression is very common. It affects people of all ages.  Many things can lead to depression. Some people become depressed after they have a stroke or find out they have a major illness like cancer or heart disease. The death of a loved one or a breakup may lead to depression. It can run in families. Most experts believe that a combination of inherited genes and stressful life events can cause it.  What happens during screening?  You may be asked to fill out a form about your depression symptoms. You and the doctor will discuss your answers. The doctor may ask you more questions to learn more about how you think, act, and feel.  What happens after screening?  If you have symptoms of depression, your doctor will talk to you about your options.  Doctors usually treat depression with medicines or counseling. Often, combining the two works best. Many people don't get help because they think that they'll get over the depression on their own. But people with depression may not get better unless they get treatment.  The cause of depression is not well understood. There may be many factors involved. But if you have depression, it's not your fault.  A serious  "symptom of depression is thinking about death or suicide. If you or someone you care about talks about this or about feeling hopeless, get help right away.  It's important to know that depression can be treated. Medicine, counseling, and self-care may help.  Where can you learn more?  Go to https://www.[employer].net/patiented  Enter T185 in the search box to learn more about \"Learning About Depression Screening.\"  Current as of: June 24, 2023  Content Version: 14.2 2024 [facility].   Care instructions adapted under license by your healthcare professional. If you have questions about a medical condition or this instruction, always ask your healthcare professional. Healthwise[employer] disclaims any warranty or liability for your use of this information.       "

## 2024-12-18 NOTE — TELEPHONE ENCOUNTER
Retail Pharmacy Prior Authorization Team   Phone: [phone]      Prior Authorization Approval    Medication: WEGOVY 2.4 MG/0.75ML SC SOAJ  Authorization Effective Date: 12/17/2024  Authorization Expiration Date: 12/17/2025  Approved Dose/Quantity: 3 ML PER 28 DAYS  Reference #:     Insurance Company: [employer] - Phone [phone] Fax [phone]  Expected CoPay: $    CoPay Card Available: No    Financial Assistance Needed:   Which Pharmacy is filling the prescription: [id number] [address] [address]  Pharmacy Notified: Yes, left voicemail for the pharmacy with my direct number should they need additional help. I also sent a fax with the approval letter.  Patient Notified: **Instructed pharmacy to notify patient when script is ready to /ship.**

## 2025-01-07 ENCOUNTER — OFFICE VISIT (OUTPATIENT)
Dept: FAMILY MEDICINE | Facility: CLINIC | Age: 30
End: 2025-01-07
Payer: COMMERCIAL

## 2025-01-07 VITALS
SYSTOLIC BLOOD PRESSURE: 117 MMHG | HEIGHT: 62 IN | BODY MASS INDEX: 29.63 KG/M2 | DIASTOLIC BLOOD PRESSURE: 79 MMHG | TEMPERATURE: 97.2 F | RESPIRATION RATE: 16 BRPM | HEART RATE: 81 BPM | OXYGEN SATURATION: 100 % | WEIGHT: 161 LBS

## 2025-01-07 DIAGNOSIS — F33.1 MODERATE EPISODE OF RECURRENT MAJOR DEPRESSIVE DISORDER (H): ICD-10-CM

## 2025-01-07 DIAGNOSIS — E66.01 MORBID OBESITY (H): ICD-10-CM

## 2025-01-07 PROCEDURE — 99213 OFFICE O/P EST LOW 20 MIN: CPT | Performed by: INTERNAL MEDICINE

## 2025-01-07 RX ORDER — CITALOPRAM HYDROBROMIDE 10 MG/1
10 TABLET ORAL DAILY
Qty: 30 TABLET | Refills: 1 | Status: SHIPPED | OUTPATIENT
Start: 2025-01-07

## 2025-01-07 ASSESSMENT — PAIN SCALES - GENERAL: PAINLEVEL_OUTOF10: NO PAIN (0)

## 2025-01-07 NOTE — PROGRESS NOTES
Assessment & Plan     (F33.1) Moderate episode of recurrent major depressive disorder (H)  Comment: She has a history of major depression.  She has been on Celexa 10 mg a day.  Her symptoms are improved, she is not having any suicidal thoughts.  She is very anxious about going back to work.  She has been unable to go to counseling.  There are some insurance issues.  She does not want to increase her Celexa to 20 mg.  Plan: We talked about feeling safe.  We talked about suicidal thoughts.  We talked about crisis centers.  We talked about trying to get back into mental health for counseling.  We talked about going back to work and being honest about whether or not she feels safe and comfortable at work.  Will refill her Celexa for the next 2 months.  Will have her follow-up in 3 to 4 weeks             (E66.01) Morbid obesity (H)  Comment: Noted.  She is on Wegovy.   Plan:          Nav Figueroa is a 29 year old, presenting for the following health issues:   Follow Up        1/7/2025    10:47 AM   Additional Questions   Roomed by Rozina BENSON   Accompanied by Self     History of Present Illness       Reason for visit:  Follow up appointment   She is taking medications regularly.     29-year-old female presents for follow-up of major depression.  She has been on Celexa 10 mg a day.  She noted a little bit of a headache when she initially started the Celexa, but seems to be tolerating it at this point in time.  Her PHQ-9 was 9.5.  Her cruz 7 was 9.5.  She feels like her symptoms are fairly well-controlled.  She is not having any suicidal thoughts.  She has not had any thoughts of hurting herself.  She was not able to get into mental health for counseling.  She notes that she is continuing to feel tired and fatigued.  She has been off of work.  She plans on going back to work on January 10.  She is very anxious about going back to work.  She is not sure that it is going to go well.             Objective    LMP  10/17/2024 (Exact Date)   There is no height or weight on file to calculate BMI.  Physical Exam   GENERAL: alert and no distress  EYES: Eyes grossly normal to inspection, PERRL and conjunctivae and sclerae normal  NEURO: Normal strength and tone, mentation intact and speech normal  PSYCH: mentation appears normal, affect normal/bright          Signed Electronically by: Shaquille Long MD

## 2025-01-07 NOTE — LETTER
January 7, 2025      Sakshi White  [address] [address] [id number]        To Whom It May Concern:    Sakshi White was seen in our clinic today. She may return to work on Friday January 10, 2025 without restrictions.      Sincerely,        Shaquille Long MD    Electronically signed

## 2025-01-26 ENCOUNTER — OFFICE VISIT (OUTPATIENT)
Dept: URGENT CARE | Facility: URGENT CARE | Age: 30
End: 2025-01-26
Payer: COMMERCIAL

## 2025-01-26 VITALS
OXYGEN SATURATION: 99 % | BODY MASS INDEX: 29.81 KG/M2 | RESPIRATION RATE: 16 BRPM | DIASTOLIC BLOOD PRESSURE: 86 MMHG | HEART RATE: 93 BPM | TEMPERATURE: 97.6 F | WEIGHT: 163 LBS | SYSTOLIC BLOOD PRESSURE: 120 MMHG

## 2025-01-26 DIAGNOSIS — R39.9 UTI SYMPTOMS: Primary | ICD-10-CM

## 2025-01-26 LAB
ALBUMIN UR-MCNC: 100 MG/DL
APPEARANCE UR: ABNORMAL
BACTERIA #/AREA URNS HPF: ABNORMAL /HPF
BILIRUB UR QL STRIP: NEGATIVE
COLOR UR AUTO: YELLOW
GLUCOSE UR STRIP-MCNC: NEGATIVE MG/DL
HGB UR QL STRIP: ABNORMAL
KETONES UR STRIP-MCNC: NEGATIVE MG/DL
LEUKOCYTE ESTERASE UR QL STRIP: ABNORMAL
MUCOUS THREADS #/AREA URNS LPF: PRESENT /LPF
NITRATE UR QL: NEGATIVE
PH UR STRIP: 5.5 [PH] (ref 5–7)
RBC #/AREA URNS AUTO: >100 /HPF
SP GR UR STRIP: >=1.03 (ref 1–1.03)
SQUAMOUS #/AREA URNS AUTO: ABNORMAL /LPF
UROBILINOGEN UR STRIP-ACNC: 0.2 E.U./DL
WBC #/AREA URNS AUTO: ABNORMAL /HPF

## 2025-01-26 PROCEDURE — 99213 OFFICE O/P EST LOW 20 MIN: CPT | Performed by: FAMILY MEDICINE

## 2025-01-26 PROCEDURE — 81001 URINALYSIS AUTO W/SCOPE: CPT | Performed by: FAMILY MEDICINE

## 2025-01-26 PROCEDURE — 87086 URINE CULTURE/COLONY COUNT: CPT | Performed by: FAMILY MEDICINE

## 2025-01-26 RX ORDER — NITROFURANTOIN 25; 75 MG/1; MG/1
100 CAPSULE ORAL 2 TIMES DAILY
Qty: 14 CAPSULE | Refills: 0 | Status: SHIPPED | OUTPATIENT
Start: 2025-01-26 | End: 2025-02-02

## 2025-01-26 NOTE — PROGRESS NOTES
Assessment & Plan     UTI symptoms  Differential discussed in detail including acute cystitis.  UA findings and previous urine cultures reviewed.  Macrobid prescribed and recommended well hydration.  Urine culture sent.  Instructed to follow-up with PCP if symptoms persist or worsen.  Patient understood and in agreement with above plan.  All questions answered.  - UA Macroscopic with reflex to Microscopic and Culture - Lab Collect; Future  - UA Macroscopic with reflex to Microscopic and Culture - Lab Collect  - UA Microscopic with Reflex to Culture  - Urine Culture  - nitroFURantoin macrocrystal-monohydrate (MACROBID) 100 MG capsule; Take 1 capsule (100 mg) by mouth 2 times daily for 7 days.      Subjective   Carolina is a 29 year old, presenting for the following health issues:  UTI (Pain with urination, frequency  )    HPI     Concern -   Onset: 3 days   Description: dysuria, urinary frequency and urgency  Intensity: moderate  Progression of Symptoms:  same  Accompanying Signs & Symptoms: no fever, chills, abdominal pain or other relevant systemic symptoms   Previous history of similar problem:   Therapies tried and outcome: Azo  Patient denies any chance of being pregnant        Review of Systems  Constitutional, HEENT, cardiovascular, pulmonary, gi and gu systems are negative, except as otherwise noted.      Objective    /86 (BP Location: Right arm, Patient Position: Sitting, Cuff Size: Adult Regular)   Pulse 93   Temp 97.6  F (36.4  C) (Oral)   Resp 16   Wt 73.9 kg (163 lb)   LMP  (LMP Unknown)   SpO2 99%   BMI 29.81 kg/m    Body mass index is 29.81 kg/m .  Physical Exam   GENERAL: alert and no distress  EYES: Eyes grossly normal to inspection, PERRL and conjunctivae and sclerae normal  RESP: no wheezes  ABDOMEN: soft, nontender  MS: no gross musculoskeletal defects noted, no edema  SKIN: no suspicious lesions or rashes  NEURO: Normal strength and tone, mentation intact and speech normal  PSYCH:  mentation appears normal, affect normal/bright    Results for orders placed or performed in visit on 01/26/25   UA Macroscopic with reflex to Microscopic and Culture - Lab Collect     Status: Abnormal    Specimen: Urine, NOS   Result Value Ref Range    Color Urine Yellow Colorless, Straw, Light Yellow, Yellow    Appearance Urine Slightly Cloudy (A) Clear    Glucose Urine Negative Negative mg/dL    Bilirubin Urine Negative Negative    Ketones Urine Negative Negative mg/dL    Specific Gravity Urine >=1.030 1.003 - 1.035    Blood Urine Large (A) Negative    pH Urine 5.5 5.0 - 7.0    Protein Albumin Urine 100 (A) Negative mg/dL    Urobilinogen Urine 0.2 0.2, 1.0 E.U./dL    Nitrite Urine Negative Negative    Leukocyte Esterase Urine Small (A) Negative   UA Microscopic with Reflex to Culture     Status: Abnormal   Result Value Ref Range    Bacteria Urine Few (A) None Seen /HPF    RBC Urine >100 (A) 0-2 /HPF /HPF    WBC Urine 10-25 (A) 0-5 /HPF /HPF    Squamous Epithelials Urine Few (A) None Seen /LPF    Mucus Urine Present (A) None Seen /LPF           Signed Electronically by: Ryan Krishnamurthy MD

## 2025-01-27 DIAGNOSIS — E66.9 OBESITY (BMI 30-39.9): ICD-10-CM

## 2025-01-27 DIAGNOSIS — R73.03 PREDIABETES: ICD-10-CM

## 2025-01-27 LAB — BACTERIA UR CULT: NORMAL

## 2025-05-15 ENCOUNTER — RESULTS FOLLOW-UP (OUTPATIENT)
Dept: FAMILY MEDICINE | Facility: CLINIC | Age: 30
End: 2025-05-15

## 2025-05-15 ENCOUNTER — OFFICE VISIT (OUTPATIENT)
Dept: FAMILY MEDICINE | Facility: CLINIC | Age: 30
End: 2025-05-15
Payer: MEDICAID

## 2025-05-15 VITALS
WEIGHT: 188.6 LBS | TEMPERATURE: 97.6 F | HEART RATE: 83 BPM | BODY MASS INDEX: 34.71 KG/M2 | SYSTOLIC BLOOD PRESSURE: 124 MMHG | OXYGEN SATURATION: 100 % | RESPIRATION RATE: 16 BRPM | DIASTOLIC BLOOD PRESSURE: 84 MMHG | HEIGHT: 62 IN

## 2025-05-15 DIAGNOSIS — F33.1 MODERATE EPISODE OF RECURRENT MAJOR DEPRESSIVE DISORDER (H): Primary | ICD-10-CM

## 2025-05-15 DIAGNOSIS — N94.10 DYSPAREUNIA IN FEMALE: ICD-10-CM

## 2025-05-15 DIAGNOSIS — E66.811 CLASS 1 OBESITY DUE TO EXCESS CALORIES WITHOUT SERIOUS COMORBIDITY WITH BODY MASS INDEX (BMI) OF 34.0 TO 34.9 IN ADULT: ICD-10-CM

## 2025-05-15 DIAGNOSIS — Z13.220 SCREENING FOR LIPOID DISORDERS: ICD-10-CM

## 2025-05-15 DIAGNOSIS — R73.03 PRE-DIABETES: ICD-10-CM

## 2025-05-15 DIAGNOSIS — E66.09 CLASS 1 OBESITY DUE TO EXCESS CALORIES WITHOUT SERIOUS COMORBIDITY WITH BODY MASS INDEX (BMI) OF 34.0 TO 34.9 IN ADULT: ICD-10-CM

## 2025-05-15 DIAGNOSIS — R63.5 WEIGHT GAIN: ICD-10-CM

## 2025-05-15 LAB
CHOLEST SERPL-MCNC: 205 MG/DL
EST. AVERAGE GLUCOSE BLD GHB EST-MCNC: 111 MG/DL
FASTING STATUS PATIENT QL REPORTED: YES
HBA1C MFR BLD: 5.5 % (ref 0–5.6)
HDLC SERPL-MCNC: 73 MG/DL
LDLC SERPL CALC-MCNC: 125 MG/DL
NONHDLC SERPL-MCNC: 132 MG/DL
TRIGL SERPL-MCNC: 34 MG/DL

## 2025-05-15 ASSESSMENT — PATIENT HEALTH QUESTIONNAIRE - PHQ9
10. IF YOU CHECKED OFF ANY PROBLEMS, HOW DIFFICULT HAVE THESE PROBLEMS MADE IT FOR YOU TO DO YOUR WORK, TAKE CARE OF THINGS AT HOME, OR GET ALONG WITH OTHER PEOPLE: SOMEWHAT DIFFICULT
SUM OF ALL RESPONSES TO PHQ QUESTIONS 1-9: 16
SUM OF ALL RESPONSES TO PHQ QUESTIONS 1-9: 16

## 2025-05-15 ASSESSMENT — PAIN SCALES - GENERAL: PAINLEVEL_OUTOF10: NO PAIN (0)

## 2025-05-15 NOTE — PROGRESS NOTES
Assessment & Plan     (F33.1) Moderate episode of recurrent major depressive disorder (H)  (primary encounter diagnosis)  Comment: Moderate depression.  Did not tolerate Celexa.  Does not want to go back on medications.  She would be interested in counseling.  She is not having suicidal thoughts.  Plan: Adult Mental Health  Referral        Weight gain may be multifactorial.      (R63.5) Weight gain  Comment: Decreased activity, increased oral intake, depression and stress eating.  She was on Wegovy in the past.  Plan: semaglutide-weight management (WEGOVY) 0.25         MG/0.5ML pen             (E66.811,  E66.09,  Z68.34) Class 1 obesity due to excess calories without serious comorbidity with body mass index (BMI) of 34.0 to 34.9 in adult  Comment: Noted as above  Plan: semaglutide-weight management (WEGOVY) 0.25         MG/0.5ML pen            (R73.03) Pre-diabetes  Comment: She has a history of prediabetes.  Plan: Hemoglobin A1c             (Z13.220) Screening for lipoid disorders  Comment: Noted  Plan: Lipid panel reflex to direct LDL Fasting             (N94.10) Dyspareunia in female  Comment: She notes occasional discomfort with intercourse.  She would like to follow-up with OB/GYN  Plan: Ob/Gyn  Referral             Nav Figueroa is a 30 year old, presenting for the following health issues:  office visit (Discuss weight gain and glucose)      5/15/2025     8:16 AM   Additional Questions   Roomed by April   Accompanied by Self     History of Present Illness       Back Pain:  She presents for follow up of back pain. Patient's back pain is a new problem.    Original cause of back pain: not sure  First noticed back pain: more than 1 month ago  Patient feels back pain: constantlyLocation of back pain:  Left middle of back  Description of back pain: dull ache  Back pain spreads: left buttocks    Since patient first noticed back pain, pain is: always present, but gets better and worse  Does  "back pain interfere with her job:  No  On a scale of 1-10 (10 being the worst), patient describes pain as:  2  What makes back pain worse: bending, certain positions, lying down, sitting and stress   Acupuncture: not tried  Acetaminophen: not helpful  Activity or exercise: not tried  Chiropractor:  Not tried  Cold: not tried  Heat: helpful  Massage: helpful  Muscle relaxants: not tried  NSAIDS: helpful  Opioids: not tried  Physical Therapy: not tried  Rest: not helpful  Steroid Injection: not tried  Stretching: not helpful  Surgery: not tried  TENS unit: not tried  Topical pain relievers: not tried  Other healthcare providers patient is seeing for back pain: None    Diabetes:   She presents for follow up of diabetes.    She is not checking blood glucose.        She is concerned about other.   She is having weight gain.            Headaches:   Since the patient's last clinic visit, headaches are: improved  The patient is getting headaches:  Once in awhile  She is able to do normal daily activities when she has a migraine.  The patient is taking the following rescue/relief medications:  Ibuprofen (Advil, Motrin)   Patient states \"I get total relief\" from the rescue/relief medications.   The patient is taking the following medications to prevent migraines:  No medications to prevent migraines  In the past 4 weeks, the patient has gone to an Urgent Care or Emergency Room 0 times times due to headaches.    She eats 0-1 servings of fruits and vegetables daily.She consumes 1 sweetened beverage(s) daily.She exercises with enough effort to increase her heart rate 9 or less minutes per day.  She exercises with enough effort to increase her heart rate 3 or less days per week. She is missing 7 dose(s) of medications per week.  She is not taking prescribed medications regularly due to remembering to take.      30-year-old female presents with a history of depression.  We had previously seen her earlier this year and treated her " with Celexa.  She did not like the way the Celexa made her feel.  She stopped taking the medication.  She does not want to go on Medications again.  She would like to try counseling.  She is also noted that she has had weight gain.  She feels like she has gained about 30 pounds.  She is not active.  She is eating to help relieve stress.  She also notes problems with intermittent headaches and intermittent back pain.  This may be related to her depression and inactivity.  Weight gain is also an issue.  She was on Wegovy in the past and tolerated it well.          Objective    /84   Pulse 83   Temp 97.6  F (36.4  C)   Resp 16   Wt 85.5 kg (188 lb 9.6 oz)   LMP 01/13/2025   SpO2 100%   BMI 34.50 kg/m    Body mass index is 34.5 kg/m .  Physical Exam   GENERAL: alert and no distress  EYES: Eyes grossly normal to inspection, PERRL and conjunctivae and sclerae normal  HENT: ear canals and TM's normal, nose and mouth without ulcers or lesions  NECK: no adenopathy, no asymmetry, masses, or scars  RESP: lungs clear to auscultation - no rales, rhonchi or wheezes  CV: regular rate and rhythm, normal S1 S2, no S3 or S4, no murmur, click or rub, no peripheral edema  ABDOMEN: soft, nontender, no hepatosplenomegaly, no masses and bowel sounds normal  MS: no gross musculoskeletal defects noted, no edema  NEURO: Normal strength and tone, mentation intact and speech normal  PSYCH: mentation appears normal, affect flat, appearance well groomed, she does not make a good eye contact.            Signed Electronically by: Shaquille Long MD

## 2025-05-19 ENCOUNTER — PATIENT OUTREACH (OUTPATIENT)
Dept: CARE COORDINATION | Facility: CLINIC | Age: 30
End: 2025-05-19
Payer: MEDICAID

## 2025-06-16 NOTE — PROGRESS NOTES
Assessment & Plan     PCOS (polycystic ovarian syndrome)  Irregular menses-declines prescription for progesterone challenge at this time. She is aware of risk of endometrial hyperplasia and will let me know if prescription desired. This has been recommended by Endocrine as well.     Pelvic pain in female  Ongoing for years-wet prep was positive an will treat, plan ultrasound for additional evaluation  - Wet preparation  - US Pelvic Transabdominal and Transvaginal; Future    Hair loss  Long standing, desires labs per below. Will check and discussed follow up.  - TSH with free T4 reflex; Future  - CBC with platelets; Future  - Vitamin D Deficiency; Future  - TSH with free T4 reflex  - CBC with platelets  - Vitamin D Deficiency    Bacterial vaginosis  Cautioned patient not to drink alcohol while taking this medication.  Advised patient to take with food as it may cause GI upset.  - metroNIDAZOLE (FLAGYL) 500 MG tablet; Take 1 tablet (500 mg) by mouth 2 times daily for 7 days.      Nav Figueroa is a 30 year old, presenting for the following health issues:  PCOS (Hair loss, painful interourse)    HPI        PCOS    Patient with a known history of PCOS, irregular menstrual cycles, elevated prolactin level presents today with concerns of pain with intercourse, vaginal odor, hair loss.  Pain with intercourse ongoing for years. Occurs every time, on the left side of her pelvis-can feel like a stabbing pain. If they alter positions, it is milder but still present. Can sometimes feel something when not having intercourse. Does not feel dry-no vaginal symptoms, they are only internal in the pelvis.  Patient had a cyst or polyp of some kind removed previously-unsure if it was a cervical or endometrial polyp or ovarian cyst, was not done at a location that is accessible by CE.  Vaginal odor x 2 months with increased vaginal discharge-clear. No vulvar itching, burning. No changes in bowels. No new partner or STI concerns,  "no new products, recent use of bubble baths, pools.   Hair loss ongoing for 10 years, has had work up for it in the past, but continues. Has seen Endocrine for her PCOS, elevated prolactin and hirsutism. LMP was in January. Patient had been on Wegovy for about 1 year, cycles more regular while on that-was every 2 months or so. Stopped it earlier this year.  States other than while on Wegovy, would generally only have a period with a progesterone trigger. Not interested in triggering a cycle at this time. No recent headaches.   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       Review of Systems  Constitutional, HEENT, cardiovascular, pulmonary, gi and gu systems are negative, except as otherwise noted.      Objective    /75 (BP Location: Right arm, Patient Position: Sitting, Cuff Size: Adult Regular)   Pulse 80   Ht 1.575 m (5' 2\")   Wt 89.8 kg (198 lb)   LMP 01/13/2025   SpO2 94%   BMI 36.21 kg/m    Body mass index is 36.21 kg/m .  Physical Exam   GENERAL: alert and no distress   (female) w/bimanual: normal female external genitalia, normal urethral meatus, normal vaginal mucosa, bimanual exam reveals mild suprapubic tenderness, no cervical motion or adnexal tenderness.   MS: no gross musculoskeletal defects noted, no edema  SKIN: no suspicious lesions or rashes  PSYCH: mentation appears normal, affect normal/bright    Results for orders placed or performed in visit on 06/17/25 (from the past 24 hours)   Wet preparation    Specimen: Vagina; Swab   Result Value Ref Range    Trichomonas Absent Absent    Yeast Absent Absent    Clue Cells Present (A) Absent    WBCs/high power field None None           Signed Electronically by: NATALYA Love CNP    "

## 2025-06-17 ENCOUNTER — OFFICE VISIT (OUTPATIENT)
Dept: OBGYN | Facility: CLINIC | Age: 30
End: 2025-06-17
Attending: INTERNAL MEDICINE
Payer: MEDICAID

## 2025-06-17 ENCOUNTER — RESULTS FOLLOW-UP (OUTPATIENT)
Dept: OBGYN | Facility: CLINIC | Age: 30
End: 2025-06-17

## 2025-06-17 VITALS
BODY MASS INDEX: 36.44 KG/M2 | HEIGHT: 62 IN | OXYGEN SATURATION: 94 % | HEART RATE: 80 BPM | DIASTOLIC BLOOD PRESSURE: 75 MMHG | WEIGHT: 198 LBS | SYSTOLIC BLOOD PRESSURE: 117 MMHG

## 2025-06-17 DIAGNOSIS — L65.9 HAIR LOSS: ICD-10-CM

## 2025-06-17 DIAGNOSIS — B96.89 BACTERIAL VAGINOSIS: Primary | ICD-10-CM

## 2025-06-17 DIAGNOSIS — E28.2 PCOS (POLYCYSTIC OVARIAN SYNDROME): ICD-10-CM

## 2025-06-17 DIAGNOSIS — B96.89 BACTERIAL VAGINOSIS: ICD-10-CM

## 2025-06-17 DIAGNOSIS — N76.0 BACTERIAL VAGINOSIS: Primary | ICD-10-CM

## 2025-06-17 DIAGNOSIS — N76.0 BACTERIAL VAGINOSIS: ICD-10-CM

## 2025-06-17 DIAGNOSIS — R10.2 PELVIC PAIN IN FEMALE: Primary | ICD-10-CM

## 2025-06-17 LAB
CLUE CELLS: PRESENT
ERYTHROCYTE [DISTWIDTH] IN BLOOD BY AUTOMATED COUNT: 13.6 % (ref 10–15)
HCT VFR BLD AUTO: 39.2 % (ref 35–47)
HGB BLD-MCNC: 12.8 G/DL (ref 11.7–15.7)
MCH RBC QN AUTO: 24.9 PG (ref 26.5–33)
MCHC RBC AUTO-ENTMCNC: 32.7 G/DL (ref 31.5–36.5)
MCV RBC AUTO: 76 FL (ref 78–100)
PLATELET # BLD AUTO: 264 10E3/UL (ref 150–450)
RBC # BLD AUTO: 5.15 10E6/UL (ref 3.8–5.2)
TRICHOMONAS, WET PREP: ABNORMAL
WBC # BLD AUTO: 3.9 10E3/UL (ref 4–11)
WBC'S/HIGH POWER FIELD, WET PREP: ABNORMAL
YEAST, WET PREP: ABNORMAL

## 2025-06-17 PROCEDURE — 82306 VITAMIN D 25 HYDROXY: CPT | Performed by: NURSE PRACTITIONER

## 2025-06-17 PROCEDURE — 84443 ASSAY THYROID STIM HORMONE: CPT | Performed by: NURSE PRACTITIONER

## 2025-06-17 PROCEDURE — 36415 COLL VENOUS BLD VENIPUNCTURE: CPT | Performed by: NURSE PRACTITIONER

## 2025-06-17 PROCEDURE — 3078F DIAST BP <80 MM HG: CPT | Performed by: NURSE PRACTITIONER

## 2025-06-17 PROCEDURE — 99459 PELVIC EXAMINATION: CPT | Performed by: NURSE PRACTITIONER

## 2025-06-17 PROCEDURE — 85027 COMPLETE CBC AUTOMATED: CPT | Performed by: NURSE PRACTITIONER

## 2025-06-17 PROCEDURE — 3074F SYST BP LT 130 MM HG: CPT | Performed by: NURSE PRACTITIONER

## 2025-06-17 PROCEDURE — 87210 SMEAR WET MOUNT SALINE/INK: CPT | Performed by: NURSE PRACTITIONER

## 2025-06-17 PROCEDURE — 99214 OFFICE O/P EST MOD 30 MIN: CPT | Performed by: NURSE PRACTITIONER

## 2025-06-17 RX ORDER — METRONIDAZOLE 7.5 MG/G
1 GEL VAGINAL AT BEDTIME
Qty: 70 G | Refills: 0 | Status: SHIPPED | OUTPATIENT
Start: 2025-06-17 | End: 2025-06-22

## 2025-06-17 RX ORDER — FAMOTIDINE 20 MG/1
20 TABLET, FILM COATED ORAL 2 TIMES DAILY
COMMUNITY

## 2025-06-17 RX ORDER — METRONIDAZOLE 500 MG/1
500 TABLET ORAL 2 TIMES DAILY
Qty: 14 TABLET | Refills: 0 | Status: SHIPPED | OUTPATIENT
Start: 2025-06-17 | End: 2025-06-17 | Stop reason: ALTCHOICE

## 2025-06-17 NOTE — PATIENT INSTRUCTIONS
If you have any questions regarding your visit, Please contact your care team.     [facility]: [phone]  To Schedule an Appointment 24/7  Call: [phone] HOU[facility] TELEPHONE NUMBER     Awa Ray- APRN CNP      Christos Carver-ALFRED[facility]                   Monday 7:30am-2:00pm    Tuesday 7:30am-4:00pm    Wednesday 7:30am-2:00pm    Thursday 7:30am-11:30am    Friday 7:30am-12:00pm [address] [address]  Phone- [phone]   Fax- [phone]     Imaging Scheduling all locations  [phone]    [facility]  [address] [place] [address] [place] [address] [phone]         Urgent Care locations:  [facility]   Monday-Friday  10 am - 8 pm  Saturday and Sunday   9 am - 5 pm     [phone]   If you need a medication refill, please contact your pharmacy. Please allow 3 business days for your refill to be completed.  As always, Thank you for trusting us with your healthcare needs!      see additional instructions from your care team below

## 2025-06-18 LAB
TSH SERPL DL<=0.005 MIU/L-ACNC: 1.68 UIU/ML (ref 0.3–4.2)
VIT D+METAB SERPL-MCNC: 21 NG/ML (ref 20–50)

## 2025-06-24 ENCOUNTER — ANCILLARY PROCEDURE (OUTPATIENT)
Dept: ULTRASOUND IMAGING | Facility: CLINIC | Age: 30
End: 2025-06-24
Attending: NURSE PRACTITIONER
Payer: MEDICAID

## 2025-06-24 DIAGNOSIS — R10.2 PELVIC PAIN IN FEMALE: ICD-10-CM

## 2025-06-24 PROCEDURE — 76830 TRANSVAGINAL US NON-OB: CPT | Mod: TC | Performed by: RADIOLOGY

## 2025-06-24 PROCEDURE — 76856 US EXAM PELVIC COMPLETE: CPT | Mod: TC | Performed by: RADIOLOGY
